# Patient Record
Sex: FEMALE | Race: WHITE | NOT HISPANIC OR LATINO | Employment: OTHER | URBAN - METROPOLITAN AREA
[De-identification: names, ages, dates, MRNs, and addresses within clinical notes are randomized per-mention and may not be internally consistent; named-entity substitution may affect disease eponyms.]

---

## 2017-03-10 ENCOUNTER — OFFICE VISIT (OUTPATIENT)
Dept: GYNECOLOGIC ONCOLOGY | Facility: CLINIC | Age: 67
End: 2017-03-10

## 2017-03-10 ENCOUNTER — LAB (OUTPATIENT)
Dept: LAB | Facility: HOSPITAL | Age: 67
End: 2017-03-10

## 2017-03-10 VITALS
TEMPERATURE: 98 F | RESPIRATION RATE: 16 BRPM | OXYGEN SATURATION: 96 % | SYSTOLIC BLOOD PRESSURE: 137 MMHG | DIASTOLIC BLOOD PRESSURE: 65 MMHG | BODY MASS INDEX: 31.46 KG/M2 | WEIGHT: 172 LBS | HEART RATE: 91 BPM

## 2017-03-10 DIAGNOSIS — R30.0 DYSURIA: Primary | ICD-10-CM

## 2017-03-10 DIAGNOSIS — R30.0 DYSURIA: ICD-10-CM

## 2017-03-10 LAB
BILIRUB UR QL STRIP: NEGATIVE
CLARITY UR: CLEAR
COLOR UR: YELLOW
GLUCOSE UR STRIP-MCNC: NEGATIVE MG/DL
HGB UR QL STRIP.AUTO: NEGATIVE
KETONES UR QL STRIP: NEGATIVE
LEUKOCYTE ESTERASE UR QL STRIP.AUTO: NEGATIVE
NITRITE UR QL STRIP: NEGATIVE
PH UR STRIP.AUTO: 6.5 [PH] (ref 5–8)
PROT UR QL STRIP: NEGATIVE
SP GR UR STRIP: 1.01 (ref 1–1.03)
UROBILINOGEN UR QL STRIP: NORMAL

## 2017-03-10 PROCEDURE — 99214 OFFICE O/P EST MOD 30 MIN: CPT | Performed by: OBSTETRICS & GYNECOLOGY

## 2017-03-10 PROCEDURE — 81003 URINALYSIS AUTO W/O SCOPE: CPT | Performed by: OBSTETRICS & GYNECOLOGY

## 2017-03-10 RX ORDER — ESTRADIOL 0.1 MG/G
2 CREAM VAGINAL DAILY
Qty: 42.5 G | Refills: 12 | Status: SHIPPED | OUTPATIENT
Start: 2017-03-10 | End: 2020-07-09

## 2017-03-10 NOTE — PROGRESS NOTES
"GYN ONCOLOGY FOLLOW-UP    Nell Montez  [id number]  1950    Chief Complaint: Follow-up (vaginal d/c)   1.  3 months following vaginal hysterectomy anterior repair  2.  Complaining of some dysuria  3.  Clear vaginal discharge  4.  Occasional blood from the vagina    History of present illness:  Nell Montez is a 67 y.o. year old female who is here today for a post-operative visit. She has been experiencing thin, yellow-clear vaginal discharge for several weeks. States it happens unexpectedly. She does not require a panty-liner. States she has also been experiencing some dysuria with initiation of voids, but only in the evening. No hematuria. Perhaps some spots of blood associated with discharge but no bright red bleeding. Normal bowel function. No fevers/chills. Also feels she has an eczematous rash in her groin and similar lesions around her ears. Did not use Premarin cream because it was too expensive.    Oncology History:     No history exists.       Past Medical History   Diagnosis Date   • Abnormal Pap smear of vagina      \"Abnormal pap smear\"   • Anxiety    • Cervical dysplasia    • Cystocele    • Migraine headache    • Osteopenia    • Transient global amnesia      fall 2015       Past Surgical History   Procedure Laterality Date   • Tonsillectomy     • Cervical biopsy     • Impacted third molar removal       wisdom teeth extracted in which two partially impacted   • Breast surgery       biopsy x 3   • Vaginal hysterectomy w/ anterior and posterior vaginal repair N/A 11/15/2016     Procedure: VAGINAL HYSTERECTOMY WITH ANTERIOR VAGINAL REPAIR;  Surgeon: Henry Pitt MD;  Location: [facility];  Service:    • Vaginal hysterectomy       with anterior repair       MEDICATIONS: The current medication list was reviewed and reconciled.     Allergies:  is allergic to betadine [povidone iodine]; codeine; e-mycin [erythromycin]; epinephrine; iodine; other; procaine; and sudafed [pseudoephedrine " hcl].    Family History   Problem Relation Age of Onset   • Other Mother      cva   • Other Father      autoimmune disease   • Rheum arthritis Daughter    • Other Daughter      ITP   • Thyroid disease Daughter    • Tuberculosis Maternal Grandmother    • Prostate cancer Maternal Grandfather        Health Maintenance: see EMR.      .     Review of Systems   Constitutional: Negative.    HENT: Negative.    Eyes: Negative.    Respiratory: Negative.    Cardiovascular: Negative.    Gastrointestinal: Negative.    Endocrine: Negative.    Genitourinary: Positive for dysuria and vaginal discharge.   Musculoskeletal: Negative.    Allergic/Immunologic: Negative.    Neurological: Negative.    Hematological: Negative.    Psychiatric/Behavioral: Negative.        Physical Exam  General Appearance:  alert, cooperative, no apparent distress, appears stated age and normal weight   Neurologic/Psychiatric: A&O x 3, gait steady, appropriate affect   HEENT:  Normocephalic, without obvious abnormality, mucous membranes moist   Neck: Supple, symmetrical, trachea midline, no adenopathy;  No thyromegaly, masses, or tenderness   Back:   Symmetric, no curvature, ROM normal, no CVA tenderness   Lungs:   Clear to auscultation bilaterally; respirations regular, even, and unlabored bilaterally   Heart:  Regular rate and rhythm, no murmurs appreciated   Breasts:  deferred   Abdomen:   Soft, non-tender, non-distended and no organomegaly   Lymph nodes: No cervical, supraclavicular, inguinal or axillary adenopathy noted   Extremities: Normal, atraumatic; no clubbing, cyanosis, or edema    Pelvic: External genitalia with two macular erythematous lesions approximately 2cm in right and left groin. No bleeding or drainage. Vaginal incisions are well healed however there is an area of what appears to be irritated granulation tissue near the urethra on the anterior incision. Silver nitrate was applied to this. Vaginal mucosa appears atrophied.     ECOG  Performance Status: 0 - Asymptomatic          Assessment and Plan:    1.  Postoperative: Vaginal atrophy, recommended beginning estrace cream as this is more affordable. Applied silver nitrate to granulation tissue. Will obtain UA for dysuria however this is most likely related to the same area of tissue. Recommended continuing some of her lifting restrictions but that she may slowly begin to increase her activity.    2.  Eczema: recommended topical 5% hydrocortisone cream    3.  We carried out a detailed conversation regarding activity limitations following an anterior repair.  Suggested limiting lifted weight to no more than 20-25 pounds.     4. RTC: 6 months          Argelia Shin MD      Note: Speech recognition transcription software was used to dictate portions of this document.  An attempt at proofreading has been made though minor errors in transcription may still be present.  Please do not hesitate to call our office with any questions.

## 2017-09-08 ENCOUNTER — OFFICE VISIT (OUTPATIENT)
Dept: GYNECOLOGIC ONCOLOGY | Facility: CLINIC | Age: 67
End: 2017-09-08

## 2017-09-08 VITALS
WEIGHT: 172 LBS | SYSTOLIC BLOOD PRESSURE: 135 MMHG | TEMPERATURE: 97.7 F | DIASTOLIC BLOOD PRESSURE: 79 MMHG | RESPIRATION RATE: 14 BRPM | HEART RATE: 79 BPM | BODY MASS INDEX: 31.46 KG/M2 | OXYGEN SATURATION: 95 %

## 2017-09-08 DIAGNOSIS — N81.11 MIDLINE CYSTOCELE: ICD-10-CM

## 2017-09-08 DIAGNOSIS — N95.2 VAGINAL ATROPHY: ICD-10-CM

## 2017-09-08 DIAGNOSIS — N81.82 INCOMPETENCE OF PUBOCERVICAL TISSUE: Primary | ICD-10-CM

## 2017-09-08 PROCEDURE — 99213 OFFICE O/P EST LOW 20 MIN: CPT | Performed by: OBSTETRICS & GYNECOLOGY

## 2017-09-08 NOTE — PROGRESS NOTES
"GYN ONCOLOGY FOLLOW-UP    Nell Montez  [id number]  1950    Chief Complaint:    1.  Six-month follow-up following vaginal repair for her symptomatic cystocele  2.  Postoperative vaginal granulation tissue  3.  Postoperative vaginal atrophy     History of present illness:  Nell Montez is a 67 y.o. year old female who is here today for follow-up for assessment of the effectiveness of surgical repair.  She reveals today that she is a trip to [facility] with her daughters and walked as much as 10 miles per day.  She was totally asymptomatic.    She initially was using the vaginal Premarin cream nightly had some side effects mainly breast tenderness.  She decreased to twice per week and has done well since.  She has had no bleeding since her last visit.    She reveals that she has not taken a bath or been since surgery.  Inquires if this is acceptable.  She does say that she has a tendency for skin sensitivity.    She is otherwise asymptomatic, she inquires regarding the need for further follow-up.  She is a regular patient of Dr. Nickolas joseph.    Oncology History:     No history exists.       Past Medical History:   Diagnosis Date   • Abnormal Pap smear of vagina     \"Abnormal pap smear\"   • Anxiety    • Cervical dysplasia    • Cystocele    • Migraine headache    • Osteopenia    • Transient global amnesia     fall 2015       Past Surgical History:   Procedure Laterality Date   • BREAST SURGERY      biopsy x 3   • CERVICAL BIOPSY     • IMPACTED THIRD MOLAR REMOVAL      wisdom teeth extracted in which two partially impacted   • TONSILLECTOMY     • VAGINAL HYSTERECTOMY      with anterior repair   • VAGINAL HYSTERECTOMY W/ ANTERIOR AND POSTERIOR VAGINAL REPAIR N/A 11/15/2016    Procedure: VAGINAL HYSTERECTOMY WITH ANTERIOR VAGINAL REPAIR;  Surgeon: Henry Pitt MD;  Location: [facility];  Service:        MEDICATIONS: The current medication list was reviewed and reconciled.     Allergies:  is allergic to betadine " [povidone iodine]; codeine; e-mycin [erythromycin]; epinephrine; iodine; other; procaine; and sudafed [pseudoephedrine hcl].    Family History   Problem Relation Age of Onset   • Other Mother      cva   • Other Father      autoimmune disease   • Rheum arthritis Daughter    • Other Daughter      ITP   • Thyroid disease Daughter    • Tuberculosis Maternal Grandmother    • Prostate cancer Maternal Grandfather               Review of Systems   Constitutional: Positive for activity change.        Tolerates increased physical activity without symptoms.   HENT: Negative.    Eyes: Positive for itching.   Respiratory: Negative.    Cardiovascular: Negative.    Gastrointestinal: Negative.    Endocrine: Negative.    Genitourinary:        Patient's prior complaints have completely disappeared.   Musculoskeletal: Negative.    Skin:        Patient complains of sensitive skin.   Allergic/Immunologic: Negative.    Neurological: Negative.    Hematological: Negative.    Psychiatric/Behavioral: Negative.        Physical Exam  Vital Signs: /79  Pulse 79  Temp 97.7 °F (36.5 °C) (Temporal Artery )   Resp 14  Wt 172 lb (78 kg)  SpO2 95%  BMI 31.46 kg/m2   General Appearance:  alert, cooperative, no apparent distress and appears stated age   Neurologic/Psychiatric: A&O x 3, gait steady, appropriate affect   HEENT:  Normocephalic, without obvious abnormality, mucous membranes moist   Neck: Supple, symmetrical, trachea midline, no adenopathy;  No thyromegaly, masses, or tenderness   Back:   Symmetric, no curvature, ROM normal, no CVA tenderness   Lungs:   Clear to auscultation bilaterally; respirations regular, even, and unlabored bilaterally   Heart:  Regular rate and rhythm, no murmurs appreciated   Breasts:  deferred   Abdomen:   Soft, non-tender, non-distended and no organomegaly   Lymph nodes: No cervical, supraclavicular, inguinal or axillary adenopathy noted   Extremities: Normal, atraumatic; no clubbing, cyanosis, or edema     Pelvic: External Genitalia  without lesions or skin changes  Vagina  is pink, moist, without lesions.   Vaginal Cuff  Female Vaginal Cuff: smooth, intact and without visible lesions  Parametria  smooth  Rectovaginal  Female rectovaginal: confirms no masses or bleeding  Excellent support since surgery.     ECOG Performance Status: 0 - Asymptomatic          Assessment and Plan:    Nell was seen today for follow-up.    Diagnoses and all orders for this visit:    Incompetence of pubocervical tissue    Midline cystocele    Vaginal atrophy  Ms. Stevens is a 67-year-old who is now 6 months ongoing a vaginal repair for a significant symptomatic cystocele.  Her therapeutic results have been excellent as noted above.  She has been using vaginal estrogen for atrophy and slow healing.  He has had excellent results and has an increased exercise tolerance since her surgery.    Return if symptoms worsen or fail to improve.  1.  In view of her current situation she can return to her primary care doctor Dr. Nickolas Fernandez.  2.  It is acceptable for her to resume tub baths and or swimming pool.  She expressed concern regarding her scan recommended a therapeutic lotion.  If reaction is intolerable; don't do it.  3.  Continue vaginal estrogen once weekly to maintain mucosal integrity.    Henry Pitt MD      Note: Speech recognition transcription software was used to dictate portions of this document.  An attempt at proofreading has been made though minor errors in transcription may still be present.  Please do not hesitate to call our office with any questions.

## 2018-02-23 ENCOUNTER — TRANSCRIBE ORDERS (OUTPATIENT)
Dept: ADMINISTRATIVE | Facility: HOSPITAL | Age: 68
End: 2018-02-23

## 2018-02-23 DIAGNOSIS — Z12.31 VISIT FOR SCREENING MAMMOGRAM: Primary | ICD-10-CM

## 2018-03-23 ENCOUNTER — HOSPITAL ENCOUNTER (OUTPATIENT)
Dept: MAMMOGRAPHY | Facility: HOSPITAL | Age: 68
Discharge: HOME | End: 2018-03-23
Attending: INTERNAL MEDICINE | Admitting: INTERNAL MEDICINE

## 2018-03-23 DIAGNOSIS — Z12.31 VISIT FOR SCREENING MAMMOGRAM: ICD-10-CM

## 2018-03-23 PROCEDURE — 77063 BREAST TOMOSYNTHESIS BI: CPT

## 2018-03-23 PROCEDURE — 77063 BREAST TOMOSYNTHESIS BI: CPT | Performed by: RADIOLOGY

## 2018-03-23 PROCEDURE — 77067 SCR MAMMO BI INCL CAD: CPT | Performed by: RADIOLOGY

## 2018-03-23 PROCEDURE — 77067 SCR MAMMO BI INCL CAD: CPT

## 2019-01-22 ENCOUNTER — TRANSCRIBE ORDERS (OUTPATIENT)
Dept: ADMINISTRATIVE | Facility: HOSPITAL | Age: 69
End: 2019-01-22

## 2019-01-22 DIAGNOSIS — K76.9 LIVER DISEASE: Primary | ICD-10-CM

## 2019-02-12 ENCOUNTER — APPOINTMENT (OUTPATIENT)
Dept: CT IMAGING | Facility: HOSPITAL | Age: 69
End: 2019-02-12
Attending: INTERNAL MEDICINE

## 2019-02-14 ENCOUNTER — TRANSCRIBE ORDERS (OUTPATIENT)
Dept: ADMINISTRATIVE | Facility: HOSPITAL | Age: 69
End: 2019-02-14

## 2019-02-14 DIAGNOSIS — Z12.31 VISIT FOR SCREENING MAMMOGRAM: Primary | ICD-10-CM

## 2019-04-02 ENCOUNTER — HOSPITAL ENCOUNTER (OUTPATIENT)
Dept: MAMMOGRAPHY | Facility: HOSPITAL | Age: 69
Discharge: HOME | End: 2019-04-02
Admitting: INTERNAL MEDICINE

## 2019-04-02 DIAGNOSIS — Z12.31 VISIT FOR SCREENING MAMMOGRAM: ICD-10-CM

## 2019-04-02 PROCEDURE — 77067 SCR MAMMO BI INCL CAD: CPT | Performed by: RADIOLOGY

## 2019-04-02 PROCEDURE — 77063 BREAST TOMOSYNTHESIS BI: CPT | Performed by: RADIOLOGY

## 2019-04-02 PROCEDURE — 77067 SCR MAMMO BI INCL CAD: CPT

## 2019-04-02 PROCEDURE — 77063 BREAST TOMOSYNTHESIS BI: CPT

## 2019-09-06 ENCOUNTER — TRANSCRIBE ORDERS (OUTPATIENT)
Dept: ADMINISTRATIVE | Facility: HOSPITAL | Age: 69
End: 2019-09-06

## 2019-09-06 DIAGNOSIS — K76.9 LIVER LESION: Primary | ICD-10-CM

## 2019-09-06 DIAGNOSIS — R16.0 LIVER MASS: ICD-10-CM

## 2019-09-06 DIAGNOSIS — C22.9 MALIGNANT NEOPLASM OF LIVER, UNSPECIFIED LIVER MALIGNANCY TYPE (HCC): ICD-10-CM

## 2019-09-11 ENCOUNTER — HOSPITAL ENCOUNTER (OUTPATIENT)
Dept: PET IMAGING | Facility: HOSPITAL | Age: 69
Discharge: HOME | End: 2019-09-11
Admitting: INTERNAL MEDICINE

## 2019-09-11 ENCOUNTER — HOSPITAL ENCOUNTER (OUTPATIENT)
Dept: PET IMAGING | Facility: HOSPITAL | Age: 69
Discharge: HOME | End: 2019-09-11

## 2019-09-11 DIAGNOSIS — R16.0 LIVER MASS: ICD-10-CM

## 2019-09-11 DIAGNOSIS — K76.9 LIVER LESION: ICD-10-CM

## 2019-09-11 DIAGNOSIS — C22.9 MALIGNANT NEOPLASM OF LIVER, UNSPECIFIED LIVER MALIGNANCY TYPE (HCC): ICD-10-CM

## 2019-09-11 LAB — GLUCOSE BLDC GLUCOMTR-MCNC: 102 MG/DL (ref 70–130)

## 2019-09-11 PROCEDURE — A9552 F18 FDG: HCPCS | Performed by: INTERNAL MEDICINE

## 2019-09-11 PROCEDURE — 82962 GLUCOSE BLOOD TEST: CPT

## 2019-09-11 PROCEDURE — 0 FLUDEOXYGLUCOSE F18 SOLUTION: Performed by: INTERNAL MEDICINE

## 2019-09-11 PROCEDURE — 78815 PET IMAGE W/CT SKULL-THIGH: CPT

## 2019-09-11 RX ADMIN — FLUDEOXYGLUCOSE F18 1 DOSE: 300 INJECTION INTRAVENOUS at 13:04

## 2019-09-13 ENCOUNTER — TRANSCRIBE ORDERS (OUTPATIENT)
Dept: ADMINISTRATIVE | Facility: HOSPITAL | Age: 69
End: 2019-09-13

## 2019-09-13 DIAGNOSIS — R93.89 ABNORMAL FINDINGS ON DIAGNOSTIC IMAGING OF OTHER SPECIFIED BODY STRUCTURES: Primary | ICD-10-CM

## 2019-09-18 ENCOUNTER — HOSPITAL ENCOUNTER (OUTPATIENT)
Dept: CT IMAGING | Facility: HOSPITAL | Age: 69
Discharge: HOME | End: 2019-09-18
Admitting: INTERNAL MEDICINE

## 2019-09-18 VITALS
SYSTOLIC BLOOD PRESSURE: 134 MMHG | BODY MASS INDEX: 27.53 KG/M2 | WEIGHT: 149.6 LBS | RESPIRATION RATE: 18 BRPM | OXYGEN SATURATION: 93 % | DIASTOLIC BLOOD PRESSURE: 82 MMHG | TEMPERATURE: 98.6 F | HEART RATE: 85 BPM | HEIGHT: 62 IN

## 2019-09-18 DIAGNOSIS — R93.89 ABNORMAL FINDINGS ON DIAGNOSTIC IMAGING OF OTHER SPECIFIED BODY STRUCTURES: ICD-10-CM

## 2019-09-18 LAB
APTT PPP: 24.5 SECONDS (ref 24–37)
INR PPP: 1.03 (ref 0.85–1.16)
PLATELET # BLD AUTO: 139 10*3/MM3 (ref 140–450)
PROTHROMBIN TIME: 13 SECONDS (ref 11.2–14.3)

## 2019-09-18 PROCEDURE — 77012 CT SCAN FOR NEEDLE BIOPSY: CPT

## 2019-09-18 PROCEDURE — 88173 CYTOPATH EVAL FNA REPORT: CPT | Performed by: INTERNAL MEDICINE

## 2019-09-18 PROCEDURE — 25010000003 LIDOCAINE 1 % SOLUTION: Performed by: RADIOLOGY

## 2019-09-18 PROCEDURE — 85730 THROMBOPLASTIN TIME PARTIAL: CPT | Performed by: RADIOLOGY

## 2019-09-18 PROCEDURE — 88172 CYTP DX EVAL FNA 1ST EA SITE: CPT | Performed by: INTERNAL MEDICINE

## 2019-09-18 PROCEDURE — 85610 PROTHROMBIN TIME: CPT | Performed by: RADIOLOGY

## 2019-09-18 PROCEDURE — 85049 AUTOMATED PLATELET COUNT: CPT | Performed by: RADIOLOGY

## 2019-09-18 RX ORDER — ASPIRIN 81 MG/1
81 TABLET ORAL DAILY
COMMUNITY
End: 2019-11-15 | Stop reason: HOSPADM

## 2019-09-18 RX ORDER — LIDOCAINE HYDROCHLORIDE 10 MG/ML
20 INJECTION, SOLUTION INFILTRATION; PERINEURAL ONCE
Status: COMPLETED | OUTPATIENT
Start: 2019-09-18 | End: 2019-09-18

## 2019-09-18 RX ORDER — SODIUM CHLORIDE 0.9 % (FLUSH) 0.9 %
3 SYRINGE (ML) INJECTION EVERY 12 HOURS SCHEDULED
Status: DISCONTINUED | OUTPATIENT
Start: 2019-09-18 | End: 2019-09-19 | Stop reason: HOSPADM

## 2019-09-18 RX ORDER — SODIUM CHLORIDE 0.9 % (FLUSH) 0.9 %
10 SYRINGE (ML) INJECTION AS NEEDED
Status: DISCONTINUED | OUTPATIENT
Start: 2019-09-18 | End: 2019-09-19 | Stop reason: HOSPADM

## 2019-09-18 RX ADMIN — LIDOCAINE HYDROCHLORIDE 20 ML: 10 INJECTION, SOLUTION INFILTRATION; PERINEURAL at 09:15

## 2019-09-18 NOTE — NURSING NOTE
Patient placed on cardiac monitors, vitals stable. Images taken and reviewed by Dr. Owens. A biopsy obtained at the umbilicus. Patient tolerated well. Sample reviewed by pathology in the room. Report to CHELSEA.

## 2019-09-20 LAB
LAB AP CASE REPORT: NORMAL
PATH REPORT.FINAL DX SPEC: NORMAL

## 2019-09-26 ENCOUNTER — LAB (OUTPATIENT)
Dept: LAB | Facility: HOSPITAL | Age: 69
End: 2019-09-26

## 2019-09-26 ENCOUNTER — APPOINTMENT (OUTPATIENT)
Dept: PREADMISSION TESTING | Facility: HOSPITAL | Age: 69
End: 2019-09-26

## 2019-09-26 ENCOUNTER — DOCUMENTATION (OUTPATIENT)
Dept: ONCOLOGY | Facility: CLINIC | Age: 69
End: 2019-09-26

## 2019-09-26 ENCOUNTER — CONSULT (OUTPATIENT)
Dept: ONCOLOGY | Facility: CLINIC | Age: 69
End: 2019-09-26

## 2019-09-26 VITALS
DIASTOLIC BLOOD PRESSURE: 60 MMHG | WEIGHT: 152 LBS | TEMPERATURE: 98.7 F | SYSTOLIC BLOOD PRESSURE: 124 MMHG | BODY MASS INDEX: 27.97 KG/M2 | HEIGHT: 62 IN | OXYGEN SATURATION: 96 % | HEART RATE: 82 BPM | RESPIRATION RATE: 16 BRPM

## 2019-09-26 DIAGNOSIS — C78.7 LIVER METASTASES: ICD-10-CM

## 2019-09-26 DIAGNOSIS — C80.1 CARCINOMA OF UNKNOWN PRIMARY (HCC): ICD-10-CM

## 2019-09-26 DIAGNOSIS — C78.7 LIVER METASTASES: Primary | Chronic | ICD-10-CM

## 2019-09-26 DIAGNOSIS — R97.8 OTHER ABNORMAL TUMOR MARKERS: ICD-10-CM

## 2019-09-26 DIAGNOSIS — C80.1 CARCINOMA OF UNKNOWN PRIMARY (HCC): Chronic | ICD-10-CM

## 2019-09-26 LAB
ALBUMIN SERPL-MCNC: 4.4 G/DL (ref 3.5–5.2)
ALBUMIN/GLOB SERPL: 2.1 G/DL
ALP SERPL-CCNC: 68 U/L (ref 39–117)
ALT SERPL W P-5'-P-CCNC: 18 U/L (ref 1–33)
ANION GAP SERPL CALCULATED.3IONS-SCNC: 11 MMOL/L (ref 5–15)
AST SERPL-CCNC: 30 U/L (ref 1–32)
BILIRUB SERPL-MCNC: 0.5 MG/DL (ref 0.2–1.2)
BUN BLD-MCNC: 28 MG/DL (ref 8–23)
BUN/CREAT SERPL: 36.8 (ref 7–25)
CALCIUM SPEC-SCNC: 10.8 MG/DL (ref 8.6–10.5)
CANCER AG125 SERPL QL: 278 U/ML (ref 0–38.1)
CANCER AG19-9 SERPL-ACNC: 12.1 U/ML
CEA SERPL-MCNC: 2.86 NG/ML
CHLORIDE SERPL-SCNC: 105 MMOL/L (ref 98–107)
CO2 SERPL-SCNC: 29 MMOL/L (ref 22–29)
CREAT BLD-MCNC: 0.76 MG/DL (ref 0.57–1)
ERYTHROCYTE [DISTWIDTH] IN BLOOD BY AUTOMATED COUNT: 14.7 % (ref 12.3–15.4)
GFR SERPL CREATININE-BSD FRML MDRD: 75 ML/MIN/1.73
GLOBULIN UR ELPH-MCNC: 2.1 GM/DL
GLUCOSE BLD-MCNC: 86 MG/DL (ref 65–99)
HCT VFR BLD AUTO: 37.5 % (ref 34–46.6)
HGB BLD-MCNC: 12.6 G/DL (ref 12–15.9)
LYMPHOCYTES # BLD AUTO: 1.5 10*3/MM3 (ref 0.7–3.1)
LYMPHOCYTES NFR BLD AUTO: 29 % (ref 19.6–45.3)
MCH RBC QN AUTO: 32.3 PG (ref 26.6–33)
MCHC RBC AUTO-ENTMCNC: 33.6 G/DL (ref 31.5–35.7)
MCV RBC AUTO: 96.1 FL (ref 79–97)
MONOCYTES # BLD AUTO: 0.3 10*3/MM3 (ref 0.1–0.9)
MONOCYTES NFR BLD AUTO: 5.4 % (ref 5–12)
NEUTROPHILS # BLD AUTO: 3.5 10*3/MM3 (ref 1.7–7)
NEUTROPHILS NFR BLD AUTO: 65.6 % (ref 42.7–76)
PLATELET # BLD AUTO: 174 10*3/MM3 (ref 140–450)
PMV BLD AUTO: 7.4 FL (ref 6–12)
POTASSIUM BLD-SCNC: 3.8 MMOL/L (ref 3.5–5.2)
PROT SERPL-MCNC: 6.5 G/DL (ref 6–8.5)
RBC # BLD AUTO: 3.9 10*6/MM3 (ref 3.77–5.28)
SODIUM BLD-SCNC: 145 MMOL/L (ref 136–145)
WBC NRBC COR # BLD: 5.3 10*3/MM3 (ref 3.4–10.8)

## 2019-09-26 PROCEDURE — 36415 COLL VENOUS BLD VENIPUNCTURE: CPT

## 2019-09-26 PROCEDURE — 85025 COMPLETE CBC W/AUTO DIFF WBC: CPT

## 2019-09-26 PROCEDURE — 82378 CARCINOEMBRYONIC ANTIGEN: CPT

## 2019-09-26 PROCEDURE — 86304 IMMUNOASSAY TUMOR CA 125: CPT

## 2019-09-26 PROCEDURE — 80053 COMPREHEN METABOLIC PANEL: CPT

## 2019-09-26 PROCEDURE — 99205 OFFICE O/P NEW HI 60 MIN: CPT | Performed by: INTERNAL MEDICINE

## 2019-09-26 PROCEDURE — 86301 IMMUNOASSAY TUMOR CA 19-9: CPT

## 2019-09-26 PROCEDURE — 93005 ELECTROCARDIOGRAM TRACING: CPT

## 2019-09-26 PROCEDURE — 93010 ELECTROCARDIOGRAM REPORT: CPT | Performed by: INTERNAL MEDICINE

## 2019-09-26 RX ORDER — RAMIPRIL 2.5 MG/1
2.5 CAPSULE ORAL DAILY
COMMUNITY
Start: 2019-06-25 | End: 2019-11-15 | Stop reason: HOSPADM

## 2019-09-26 NOTE — PROGRESS NOTES
I saw patient today in Dr. Swenson's office. Patient comes to office today with her sister-in-law. Patient says that she has been tired and has lost around 20 pounds since January of 2019. I introduced myself and went over my role as her Nurse Navigator. I provided patient with my contact card. Dr. Swenson discussed the Outpatient Surgery of removal of the umbilical mass. Patient agreed and scheduled the surgery for next Wednesday at 8am at the [facility]. I walked patient over to Registration and then they will take patient to PAT. Patient emotional from time to time during our visit. She asked me what she had done wrong. I told her that she had done nothing wrong. I told patient that we have a team that works together to care for her. Patient will call for any navigational needs. AG

## 2019-09-26 NOTE — PROGRESS NOTES
CHIEF COMPLAINT: Widely metastatic cancer    REASON FOR REFERRAL: Widely metastatic cancer      RECORDS OBTAINED  Records of the patients history including those obtained from Re[facility] jake were reviewed and summarized in detail.    Oncology/Hematology History    1. History of benign transvaginal hysterectomy 1/1/2016  2. History of reflux  3. History of transient global amnesia  4. IV dye allergy as well as allergy to barium enema  5. Diffuse metastases  6. Anterior mediastinal and subcarinal nodes  7. Right lower lobe nodule  8. Head of pancreas uptake  9. Paraesophageal node uptake  10. Liver metastases  11. Splenic hilar metastases  12. Bilateral adrenal gland uptake  13. Mesenteric/right retroperitoneal/right iliac uptake  14. Spine, right shoulder, right rib, sacrum, right hip metastases  15. Periumbilical subcutaneous nodule  16. Extensive periportal and periaortic lymphadenopathy    -9/26/2019 [facility] medical oncology consultation: Oncologic history as follows  1/1/2016 vaginal hysterectomy for chronic cystic cervicitis and weakly proliferative endometrium with endometritis and a leiomyoma benign.  6/8/2016 colonoscopy benign  1/2019 apparently had CT screening of the chest that showed a couple of liver abnormalities (do not have those reports).  Unable to do with contrast and IV dye allergy.  Asymptomatic.  9/5/2019 CT of the abdomen without contrast shows at least 2 hepatic lesions, 4 cm in the right lobe of the liver and 2 cm in the left lobe of the liver.  9/11/2019 PET scan shows too numerous to count increased hypermetabolism in the anterior mediastinal, subcarinal, right lower lobe, head of the pancreas, esophagus, adjacent paraesophageal lymph nodes, liver, splenic hilum, left and right adrenal glands.  Also uptake in the mesentery, right retroperitoneum, right iliac chain, periumbilical subcutaneous fat, vaginal stump, and numerous bony lesions.  The bony lesions include the spine, right shoulder,  "right rib, sacrum, and right hip.  The 1.3 cm periumbilical subcutaneous nodule appears most amenable to biopsy.  Extensive periportal and periaortic lymphadenopathy with evidence of peritoneal metastasis for which PET CT was recommended.  9/18/2019 CT-guided fine-needle aspirate of nodule within the anterior abdominal wall \"atypical\" but unable to further typify.  9/26/2019 Dr. Kline initial visit.  Reviewed PET with patient and worrisome but nondiagnostic fine-needle aspirate.  Going to get sufficient tissue not only for diagnosis but for molecular testing that could include Biotheranostics to distinguish tissue type and 15 slides for Strata testing.  We will get our navigator on board as well.  She is having some nighttime sweats without fever though she does not describe them as bed drenching but this is new for her in the last month.  The range of possibilities here are broad and include lymphoma as well as a multiplicity of carcinomas but I need more than \"atypical\" cells on a fine-needle aspirate to ultimately decide our course.  We did talk about the likelihood that what ever we are up against it is unlikely to be curative but we will wait out her risk benefit ratio once we know the pathology.        Liver metastases (CMS/HCC)    9/26/2019 Initial Diagnosis     Liver metastases (CMS/HCC)            HISTORY OF PRESENT ILLNESS:  The patient is a 69 y.o.  female, referred for widely metastatic cancer.  Ironically, she feels great.  She had a history of benign vaginal hysterectomy and her ovaries were left.  Stumbled upon an abnormality January 2019 on cardiac screening CT showing a couple of abnormalities in the liver.  Subsequent scanning done September as outlined above followed by periumbilical nodule biopsy.  Other than for some recent onset in the last month of increasing sweating at night without bed drenching or fevers or weight changes, she has no complaints.    REVIEW OF SYSTEMS:  A 14 point review of " "systems was performed and is negative except as noted above.    Past Medical History:   Diagnosis Date   • Abnormal Pap smear of vagina     \"Abnormal pap smear\"   • Anxiety    • Cervical dysplasia    • Cystocele    • GERD (gastroesophageal reflux disease)     INTERMITTENT- NOT MEDICATION   • Migraine headache    • Osteopenia    • Transient global amnesia     fall 2015     Past Surgical History:   Procedure Laterality Date   • BREAST CYST ASPIRATION     • BREAST SURGERY      biopsy x 3   • CERVICAL BIOPSY      PT UNAWARE OF CERVIX BIOPSY   • IMPACTED THIRD MOLAR REMOVAL      wisdom teeth extracted in which two partially impacted   • TONSILLECTOMY     • VAGINAL HYSTERECTOMY      with anterior repair   • VAGINAL HYSTERECTOMY W/ ANTERIOR AND POSTERIOR VAGINAL REPAIR N/A 11/15/2016    Procedure: VAGINAL HYSTERECTOMY WITH ANTERIOR VAGINAL REPAIR;  Surgeon: Henry Pitt MD;  Location: [facility];  Service:        Current Outpatient Medications on File Prior to Visit   Medication Sig Dispense Refill   • Cholecalciferol (VITAMIN D) 1000 UNITS tablet Take 1,000 Units by mouth Daily. Takes 2 daily     • estradiol (ESTRACE VAGINAL) 0.1 MG/GM vaginal cream Insert 2 g into the vagina Daily. 42.5 g 12   • loratadine (CLARITIN) 10 MG tablet Take 10 mg by mouth daily.     • ramipril (ALTACE) 2.5 MG capsule Take 2.5 mg by mouth Daily.     • aspirin 81 MG EC tablet Take 81 mg by mouth Daily. LAST DOSE 11TH     • Docusate Sodium (DSS) 100 MG capsule Take 100 mg by mouth 2 (Two) Times a Day. 60 each 0   • Multiple Vitamins-Minerals (MULTIVITAMIN ADULT PO) Take  by mouth Daily.     • Omega-3 Fatty Acids (FISH OIL) 1000 MG capsule capsule Take  by mouth Daily With Breakfast.       No current facility-administered medications on file prior to visit.        Allergies   Allergen Reactions   • Betadine [Povidone Iodine]    • Codeine    • E-Mycin [Erythromycin]    • Epinephrine    • Iodine    • Other      MSG, Rema, Preservatives in eye " "ointment   • Procaine Other (See Comments)     DENTAL ANASTHESIA CAUSED HEART RACING- YRS AGO\"   • Sudafed [Pseudoephedrine Hcl]        Social History     Socioeconomic History   • Marital status:      Spouse name: Not on file   • Number of children: 7   • Years of education: Not on file   • Highest education level: Not on file   Tobacco Use   • Smoking status: Former Smoker     Packs/day: 0.50     Years: 4.00     Pack years: 2.00     Last attempt to quit: 1970     Years since quittin.7   • Smokeless tobacco: Never Used   • Tobacco comment: quit approximately    Substance and Sexual Activity   • Alcohol use: No     Comment: rare   • Drug use: No   • Sexual activity: Not Currently       Family History   Problem Relation Age of Onset   • Other Mother         cva   • Other Father         autoimmune disease   • Rheum arthritis Daughter    • Other Daughter         ITP   • Thyroid disease Daughter    • Tuberculosis Maternal Grandmother    • Prostate cancer Maternal Grandfather    • Brain cancer Cousin    • Breast cancer Neg Hx    • Ovarian cancer Neg Hx        PHYSICAL EXAM:    /60   Pulse 82   Temp 98.7 °F (37.1 °C)   Resp 16   Ht 157.5 cm (62\")   Wt 68.9 kg (152 lb)   SpO2 96%   BMI 27.80 kg/m²     ECOG: (0) Fully active, able to carry on all predisease performance without restriction  General: well appearing female in no acute distress  HEENT: sclera anicteric, oropharynx clear  Lymphatics: no cervical, supraclavicular, inguinal, or axillary adenopathy  Cardiovascular: regular rate and rhythm, no murmurs  Neck: Supple; No thyromegaly  Lungs: clear to auscultation bilaterally. No respiratory distress.   Abdomen: soft, nontender, nondistended.  No palpable organomegaly  Extremities: no cyanosis, clubbing, edema, or cords  Skin: no rashes, lesions, bruising, or petechiae  Neuro: Alert and oriented x 4; Moving all extremities.  Psych: No anxiety or depression    Lab Results   Component Value " Date    HGB 11.2 (L) 11/16/2016    HCT 33.7 (L) 11/16/2016    MCV 89.4 11/16/2016     (L) 09/18/2019    WBC 8.39 11/16/2016    NEUTROABS 5.64 11/16/2016    LYMPHSABS 2.09 11/16/2016    MONOSABS 0.60 11/16/2016    EOSABS 0.03 (L) 11/16/2016    BASOSABS 0.02 11/16/2016     Lab Results   Component Value Date    GLUCOSE 106 (H) 11/16/2016    BUN 8 (L) 11/16/2016    CREATININE 0.60 11/16/2016     11/16/2016    K 3.9 11/16/2016     11/16/2016    CO2 27.0 11/16/2016    CALCIUM 8.5 (L) 11/16/2016    PROTEINTOT 6.4 11/14/2016    ALBUMIN 3.70 11/14/2016    BILITOT 0.6 11/14/2016    ALKPHOS 77 11/14/2016    AST 24 11/14/2016    ALT 22 11/14/2016           Assessment/Plan     1. History of benign transvaginal hysterectomy 1/1/2016  2. History of reflux  3. History of transient global amnesia  4. IV dye allergy as well as allergy to barium enema  5. Diffuse metastases  6. Anterior mediastinal and subcarinal nodes  7. Right lower lobe nodule  8. Head of pancreas uptake  9. Paraesophageal node uptake  10. Liver metastases  11. Splenic hilar metastases  12. Bilateral adrenal gland uptake  13. Mesenteric/right retroperitoneal/right iliac uptake  14. Spine, right shoulder, right rib, sacrum, right hip metastases  15. Periumbilical subcutaneous nodule  16. Extensive periportal and periaortic lymphadenopathy    -9/26/2019 [facility] medical oncology consultation: Oncologic history as follows  1/1/2016 vaginal hysterectomy for chronic cystic cervicitis and weakly proliferative endometrium with endometritis and a leiomyoma benign.  6/8/2016 colonoscopy benign  1/2019 apparently had CT screening of the chest that showed a couple of liver abnormalities (do not have those reports).  Unable to do with contrast and IV dye allergy.  Asymptomatic.  9/5/2019 CT of the abdomen without contrast shows at least 2 hepatic lesions, 4 cm in the right lobe of the liver and 2 cm in the left lobe of the liver.  9/11/2019 PET scan shows too  "numerous to count increased hypermetabolism in the anterior mediastinal, subcarinal, right lower lobe, head of the pancreas, esophagus, adjacent paraesophageal lymph nodes, liver, splenic hilum, left and right adrenal glands.  Also uptake in the mesentery, right retroperitoneum, right iliac chain, periumbilical subcutaneous fat, vaginal stump, and numerous bony lesions.  The bony lesions include the spine, right shoulder, right rib, sacrum, and right hip.  The 1.3 cm periumbilical subcutaneous nodule appears most amenable to biopsy.  Extensive periportal and periaortic lymphadenopathy with evidence of peritoneal metastasis for which PET CT was recommended.  9/18/2019 CT-guided fine-needle aspirate of nodule within the anterior abdominal wall \"atypical\" but unable to further typify.  9/26/2019 Dr. Kline initial visit.  Reviewed PET with patient and worrisome but nondiagnostic fine-needle aspirate.  Going to get sufficient tissue not only for diagnosis but for molecular testing that could include Biotheranostics to distinguish tissue type and 15 slides for Strata testing.  We will get our navigator on board as well.  She is having some nighttime sweats without fever though she does not describe them as bed drenching but this is new for her in the last month.  The range of possibilities here are broad and include lymphoma as well as a multiplicity of carcinomas but I need more than \"atypical\" cells on a fine-needle aspirate to ultimately decide our course.  We did talk about the likelihood that what ever we are up against it is unlikely to be curative but we will wait out her risk benefit ratio once we know the pathology.  Though they may ultimately not be helpful, while we are getting tissue I will go ahead and send off a barrage of tumor markers that might help guide us to the primary.  I discussed with patient face-to-face for 65 minutes greater than 50% spent counseling regarding the complexity of this decision " tree and the plan as outlined above.  I would add parenthetically that she is up-to-date on her colonoscopy and mammography has been done in the last year.  Hence we will just check Ca125, CA-19-9, and CEA.      Ranulfo Kline MD    9/26/2019

## 2019-10-02 ENCOUNTER — LAB REQUISITION (OUTPATIENT)
Dept: LAB | Facility: HOSPITAL | Age: 69
End: 2019-10-02

## 2019-10-02 DIAGNOSIS — C79.9 SECONDARY MALIGNANT NEOPLASM OF UNSPECIFIED SITE (CODE) (HCC): ICD-10-CM

## 2019-10-02 PROCEDURE — 88342 IMHCHEM/IMCYTCHM 1ST ANTB: CPT | Performed by: SURGERY

## 2019-10-02 PROCEDURE — 88365 INSITU HYBRIDIZATION (FISH): CPT | Performed by: SURGERY

## 2019-10-02 PROCEDURE — 88305 TISSUE EXAM BY PATHOLOGIST: CPT | Performed by: SURGERY

## 2019-10-02 PROCEDURE — 88341 IMHCHEM/IMCYTCHM EA ADD ANTB: CPT | Performed by: SURGERY

## 2019-10-02 PROCEDURE — 88364 INSITU HYBRIDIZATION (FISH): CPT | Performed by: SURGERY

## 2019-10-02 PROCEDURE — 88360 TUMOR IMMUNOHISTOCHEM/MANUAL: CPT | Performed by: SURGERY

## 2019-10-07 ENCOUNTER — LAB (OUTPATIENT)
Dept: LAB | Facility: HOSPITAL | Age: 69
End: 2019-10-07

## 2019-10-07 ENCOUNTER — OFFICE VISIT (OUTPATIENT)
Dept: ONCOLOGY | Facility: CLINIC | Age: 69
End: 2019-10-07

## 2019-10-07 VITALS
OXYGEN SATURATION: 95 % | HEIGHT: 62 IN | BODY MASS INDEX: 27.79 KG/M2 | RESPIRATION RATE: 18 BRPM | TEMPERATURE: 98.8 F | SYSTOLIC BLOOD PRESSURE: 119 MMHG | DIASTOLIC BLOOD PRESSURE: 60 MMHG | WEIGHT: 151 LBS | HEART RATE: 91 BPM

## 2019-10-07 DIAGNOSIS — Z51.11 ENCOUNTER FOR ANTINEOPLASTIC CHEMOTHERAPY: ICD-10-CM

## 2019-10-07 DIAGNOSIS — T45.1X5S ADVERSE REACTION TO ANTINEOPLASTIC DRUG, SEQUELA: ICD-10-CM

## 2019-10-07 DIAGNOSIS — C78.7 LIVER METASTASES: Chronic | ICD-10-CM

## 2019-10-07 DIAGNOSIS — C83.38 DIFFUSE LARGE B-CELL LYMPHOMA OF LYMPH NODES OF MULTIPLE REGIONS (HCC): ICD-10-CM

## 2019-10-07 DIAGNOSIS — C83.38 DIFFUSE LARGE B-CELL LYMPHOMA OF LYMPH NODES OF MULTIPLE REGIONS (HCC): Primary | ICD-10-CM

## 2019-10-07 LAB
LDH SERPL-CCNC: 216 U/L (ref 135–214)
URATE SERPL-MCNC: 5.9 MG/DL (ref 2.4–5.7)

## 2019-10-07 PROCEDURE — 84550 ASSAY OF BLOOD/URIC ACID: CPT

## 2019-10-07 PROCEDURE — 36415 COLL VENOUS BLD VENIPUNCTURE: CPT

## 2019-10-07 PROCEDURE — 99215 OFFICE O/P EST HI 40 MIN: CPT | Performed by: INTERNAL MEDICINE

## 2019-10-07 PROCEDURE — 83615 LACTATE (LD) (LDH) ENZYME: CPT

## 2019-10-07 RX ORDER — PREDNISONE 50 MG/1
100 TABLET ORAL DAILY
Qty: 10 TABLET | Refills: 5 | Status: SHIPPED | OUTPATIENT
Start: 2019-10-07 | End: 2019-10-12

## 2019-10-07 RX ORDER — ONDANSETRON HYDROCHLORIDE 8 MG/1
8 TABLET, FILM COATED ORAL 3 TIMES DAILY PRN
Qty: 30 TABLET | Refills: 5 | Status: SHIPPED | OUTPATIENT
Start: 2019-10-07 | End: 2021-01-01

## 2019-10-07 RX ORDER — LIDOCAINE AND PRILOCAINE 25; 25 MG/G; MG/G
CREAM TOPICAL AS NEEDED
Qty: 30 G | Refills: 3 | Status: SHIPPED | OUTPATIENT
Start: 2019-10-07 | End: 2019-11-25 | Stop reason: HOSPADM

## 2019-10-07 NOTE — PROGRESS NOTES
CHIEF COMPLAINT: Management of lymphoma    Problem List:  Oncology/Hematology History    1. History of benign transvaginal hysterectomy 1/1/2016  2. History of reflux  3. History of transient global amnesia  4. IV dye allergy as well as allergy to barium enema  5. 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    -9/26/2019 [facility] medical oncology consultation: Oncologic history as follows  1/1/2016 vaginal hysterectomy for chronic cystic cervicitis and weakly proliferative endometrium with endometritis and a leiomyoma benign.  6/8/2016 colonoscopy benign  1/2019 apparently had CT screening of the chest that showed a couple of liver abnormalities (do not have those reports).  Unable to do with contrast and IV dye allergy.  Asymptomatic.  9/5/2019 CT of the abdomen without contrast shows at least 2 hepatic lesions, 4 cm in the right lobe of the liver and 2 cm in the left lobe of the liver.  9/11/2019 PET scan shows too numerous to count increased hypermetabolism in the anterior mediastinal, subcarinal, right lower lobe, head of the pancreas, esophagus, adjacent paraesophageal lymph nodes, liver, splenic hilum, left and right adrenal glands.  Also uptake in the mesentery, right retroperitoneum, right iliac chain, periumbilical subcutaneous fat, vaginal stump, and numerous bony lesions.  The bony lesions include the spine, right shoulder, right rib, sacrum, and right hip.  The 1.3 cm periumbilical subcutaneous nodule appears  "most amenable to biopsy.  Extensive periportal and periaortic lymphadenopathy with evidence of peritoneal metastasis for which PET CT was recommended.  9/18/2019 CT-guided fine-needle aspirate of nodule within the anterior abdominal wall \"atypical\" but unable to further typify.  9/26/2019 Dr. Kline initial visit.  Reviewed PET with patient and worrisome but nondiagnostic fine-needle aspirate.  Going to get sufficient tissue not only for diagnosis but for molecular testing that could include Biotheranostics to distinguish tissue type and 15 slides for Strata testing.  We will get our navigator on board as well.  She is having some nighttime sweats without fever though she does not describe them as bed drenching but this is new for her in the last month.  The range of possibilities here are broad and include lymphoma as well as a multiplicity of carcinomas but I need more than \"atypical\" cells on a fine-needle aspirate to ultimately decide our course.  We did talk about the likelihood that what ever we are up against it is unlikely to be curative but we will wait out her risk benefit ratio once we know the pathology.  Though they may ultimately not be helpful, while we are getting tissue I will go ahead and send off a barrage of tumor markers that might help guide us to the primary.    -10/7/2019 medical oncology office visit: Preliminary path report shows T-cell rich B-cell non-Hodgkin's lymphoma.  While awaiting final pathology we will get her port and echocardiogram and plan on treating her with Rituxan CHOP with Neulasta device for her stage IV high-grade lymphoma.  She has a high risk IPI score.  We will get her LDH and uric acid today.  Risks including renal dysfunction, nausea, neuropathy, pancytopenia, cardiac dysfunction, infusion reactions, etc. were discussed in detail in addition to additional side effects as standard per all these medicines.  She knows that if this is the T-cell rich B-cell lymphoma that " "we treat this like a diffuse large B-cell lymphoma and that there is a possibility but not a guarantee of cure and it is not the majority in this setting but that plan and the statistics may change if the final pathology changes.        Liver metastases (CMS/HCC)    9/26/2019 Initial Diagnosis     Liver metastases (CMS/HCC)            HISTORY OF PRESENT ILLNESS:  The patient is a 69 y.o. female, here for follow up on management of stage IV lymphoma.  Final pathology pending.  Interestingly, her night sweats vanished when she stopped her blood pressure medicines.  She is off of niacin as well as her statin drugs as well as aspirin per her own recognizance recently.  She says she feels much better off of everything.  Presently no bed drenching night sweats, fevers, chills, etc.  She is eating well and her weight loss has stabilized.      Past Medical History:   Diagnosis Date   • Abnormal Pap smear of vagina     \"Abnormal pap smear\"   • Anxiety    • Cervical dysplasia    • Cystocele    • GERD (gastroesophageal reflux disease)     INTERMITTENT- NOT MEDICATION   • Migraine headache    • Osteopenia    • Transient global amnesia     fall 2015     Past Surgical History:   Procedure Laterality Date   • BREAST CYST ASPIRATION     • BREAST SURGERY      biopsy x 3   • CERVICAL BIOPSY      PT UNAWARE OF CERVIX BIOPSY   • IMPACTED THIRD MOLAR REMOVAL      wisdom teeth extracted in which two partially impacted   • OTHER SURGICAL HISTORY  10/02/2019    surgical bx of abd wall tumor   • TONSILLECTOMY     • VAGINAL HYSTERECTOMY      with anterior repair   • VAGINAL HYSTERECTOMY W/ ANTERIOR AND POSTERIOR VAGINAL REPAIR N/A 11/15/2016    Procedure: VAGINAL HYSTERECTOMY WITH ANTERIOR VAGINAL REPAIR;  Surgeon: Henry Pitt MD;  Location: [facility];  Service:        Allergies   Allergen Reactions   • Betadine [Povidone Iodine]    • Codeine    • E-Mycin [Erythromycin]    • Epinephrine    • Iodine    • Other      MSG, Rema, " "Preservatives in eye ointment   • Procaine Other (See Comments)     DENTAL ANASTHESIA CAUSED HEART RACING- YRS AGO\"   • Sudafed [Pseudoephedrine Hcl]        Family History and Social History reviewed and changed as necessary      REVIEW OF SYSTEM:   Review of Systems   Constitutional: Negative for appetite change, chills, diaphoresis, fatigue, fever and unexpected weight change.   HENT:   Negative for mouth sores, sore throat and trouble swallowing.    Eyes: Negative for icterus.   Respiratory: Negative for cough, hemoptysis and shortness of breath.    Cardiovascular: Negative for chest pain, leg swelling and palpitations.   Gastrointestinal: Negative for abdominal distention, abdominal pain, blood in stool, constipation, diarrhea, nausea and vomiting.   Endocrine: Negative for hot flashes.   Genitourinary: Negative for bladder incontinence, difficulty urinating, dysuria, frequency and hematuria.    Musculoskeletal: Negative for gait problem, neck pain and neck stiffness.   Skin: Negative for rash.   Neurological: Negative for dizziness, gait problem, headaches, light-headedness and numbness.   Hematological: Negative for adenopathy. Does not bruise/bleed easily.   Psychiatric/Behavioral: Negative for depression. The patient is not nervous/anxious.    All other systems reviewed and are negative.       PHYSICAL EXAM    Vitals:    10/07/19 0807   BP: 119/60   Pulse: 91   Resp: 18   Temp: 98.8 °F (37.1 °C)   TempSrc: Temporal   SpO2: 95%   Weight: 68.5 kg (151 lb)   Height: 157.5 cm (62\")     Constitutional: Appears well-developed and well-nourished. No distress.   ECOG: (0) Fully active, able to carry on all predisease performance without restriction  HENT:   Head: Normocephalic.   Mouth/Throat: Oropharynx is clear and moist.   Eyes: Conjunctivae are normal. Pupils are equal, round, and reactive to light. No scleral icterus.   Neck: Neck supple. No JVD present. No thyromegaly present.   Cardiovascular: Normal rate, " regular rhythm and normal heart sounds.    Pulmonary/Chest: Breath sounds normal. No respiratory distress.   Abdominal: Soft. Exhibits no distension and no mass. There is no hepatosplenomegaly. There is no tenderness. There is no rebound and no guarding.   Musculoskeletal:Exhibits no edema, tenderness or deformity.   Neurological: Alert and oriented to person, place, and time. Exhibits normal muscle tone.   Skin: No ecchymosis, no petechiae and no rash noted. Not diaphoretic. No cyanosis. Nails show no clubbing.   Psychiatric: Normal mood and affect.   Vitals reviewed.      Lab Results   Component Value Date    HGB 12.6 09/26/2019    HCT 37.5 09/26/2019    MCV 96.1 09/26/2019     09/26/2019    WBC 5.30 09/26/2019    NEUTROABS 3.50 09/26/2019    LYMPHSABS 1.50 09/26/2019    MONOSABS 0.30 09/26/2019    EOSABS 0.03 (L) 11/16/2016    BASOSABS 0.02 11/16/2016       Lab Results   Component Value Date    GLUCOSE 86 09/26/2019    BUN 28 (H) 09/26/2019    CREATININE 0.76 09/26/2019     09/26/2019    K 3.8 09/26/2019     09/26/2019    CO2 29.0 09/26/2019    CALCIUM 10.8 (H) 09/26/2019    PROTEINTOT 6.5 09/26/2019    ALBUMIN 4.40 09/26/2019    BILITOT 0.5 09/26/2019    ALKPHOS 68 09/26/2019    AST 30 09/26/2019    ALT 18 09/26/2019                   ASSESSMENT & PLAN:    1. History of benign transvaginal hysterectomy 1/1/2016  2. History of reflux  3. History of transient global amnesia  4. IV dye allergy as well as allergy to barium enema  5. 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    -9/26/2019 [facility] medical oncology consultation: Oncologic history as follows  1/1/2016 vaginal hysterectomy for chronic cystic cervicitis and weakly proliferative endometrium with endometritis and a leiomyoma benign.  6/8/2016 colonoscopy benign  1/2019 apparently had CT screening of the chest that showed a couple of liver abnormalities (do not have those reports).  Unable to do with contrast and IV dye allergy.  Asymptomatic.  9/5/2019 CT of the abdomen without contrast shows at least 2 hepatic lesions, 4 cm in the right lobe of the liver and 2 cm in the left lobe of the liver.  9/11/2019 PET scan shows too numerous to count increased hypermetabolism in the anterior mediastinal, subcarinal, right lower lobe, head of the pancreas, esophagus, adjacent paraesophageal lymph nodes, liver, splenic hilum, left and right adrenal glands.  Also uptake in the mesentery, right retroperitoneum, right iliac chain, periumbilical subcutaneous fat, vaginal stump, and numerous bony lesions.  The bony lesions include the spine, right shoulder, right rib, sacrum, and right hip.  The 1.3 cm periumbilical subcutaneous nodule appears most amenable to biopsy.  Extensive periportal and periaortic lymphadenopathy with evidence of peritoneal metastasis for which PET CT was recommended.  9/18/2019 CT-guided fine-needle aspirate of nodule within the anterior abdominal wall \"atypical\" but unable to further typify.  9/26/2019 Dr. Kline initial visit.  Reviewed PET with patient and worrisome but nondiagnostic fine-needle aspirate.  Going to get sufficient tissue not only for diagnosis but for molecular testing that could include Biotheranostics to distinguish tissue type and 15 slides for Strata testing.  We will get our navigator on board as well.  She is having some nighttime sweats without fever though she does not describe them as bed drenching but this is new " "for her in the last month.  The range of possibilities here are broad and include lymphoma as well as a multiplicity of carcinomas but I need more than \"atypical\" cells on a fine-needle aspirate to ultimately decide our course.  We did talk about the likelihood that what ever we are up against it is unlikely to be curative but we will wait out her risk benefit ratio once we know the pathology.  Though they may ultimately not be helpful, while we are getting tissue I will go ahead and send off a barrage of tumor markers that might help guide us to the primary.    -10/7/2019 medical oncology office visit: Preliminary path report shows T-cell rich B-cell non-Hodgkin's lymphoma.  While awaiting final pathology we will get her port and echocardiogram and plan on treating her with Rituxan CHOP with Neulasta device for her stage IV high-grade lymphoma.  She has a high risk IPI score.  We will get her LDH and uric acid today.  Risks including renal dysfunction, nausea, neuropathy, pancytopenia, cardiac dysfunction, infusion reactions, etc. were discussed in detail in addition to additional side effects as standard per all these medicines.  She knows that if this is the T-cell rich B-cell lymphoma that we treat this like a diffuse large B-cell lymphoma and that there is a possibility but not a guarantee of cure and it is not the majority in this setting but that plan and the statistics may change if the final pathology changes.  She will see my nurse practitioner for chemotherapy preparation visit and I will see her in a couple of weeks to get started.    I discussed with patient 1 hour face-to-face greater than 50% spent counseling regarding complexity of this process.    Ranulfo Kline MD    10/07/2019      "

## 2019-10-08 ENCOUNTER — LAB (OUTPATIENT)
Dept: LAB | Facility: HOSPITAL | Age: 69
End: 2019-10-08

## 2019-10-08 ENCOUNTER — DOCUMENTATION (OUTPATIENT)
Dept: ONCOLOGY | Facility: CLINIC | Age: 69
End: 2019-10-08

## 2019-10-08 ENCOUNTER — OFFICE VISIT (OUTPATIENT)
Dept: ONCOLOGY | Facility: CLINIC | Age: 69
End: 2019-10-08

## 2019-10-08 VITALS
BODY MASS INDEX: 27.25 KG/M2 | OXYGEN SATURATION: 94 % | TEMPERATURE: 98.7 F | DIASTOLIC BLOOD PRESSURE: 61 MMHG | RESPIRATION RATE: 14 BRPM | HEART RATE: 88 BPM | SYSTOLIC BLOOD PRESSURE: 135 MMHG | WEIGHT: 149 LBS

## 2019-10-08 DIAGNOSIS — C83.38 DIFFUSE LARGE B-CELL LYMPHOMA OF LYMPH NODES OF MULTIPLE REGIONS (HCC): ICD-10-CM

## 2019-10-08 LAB
HBV SURFACE AB SER RIA-ACNC: REACTIVE
HBV SURFACE AG SERPL QL IA: NORMAL

## 2019-10-08 PROCEDURE — 99215 OFFICE O/P EST HI 40 MIN: CPT | Performed by: NURSE PRACTITIONER

## 2019-10-08 PROCEDURE — 36415 COLL VENOUS BLD VENIPUNCTURE: CPT

## 2019-10-08 PROCEDURE — 86706 HEP B SURFACE ANTIBODY: CPT

## 2019-10-08 PROCEDURE — 86704 HEP B CORE ANTIBODY TOTAL: CPT

## 2019-10-08 PROCEDURE — 87340 HEPATITIS B SURFACE AG IA: CPT

## 2019-10-08 RX ORDER — ALLOPURINOL 300 MG/1
300 TABLET ORAL DAILY
Qty: 30 TABLET | Refills: 0 | Status: ON HOLD | OUTPATIENT
Start: 2019-10-08 | End: 2019-11-14 | Stop reason: SDUPTHER

## 2019-10-08 NOTE — PROGRESS NOTES
CHEMOTHERAPY PREPARATION    Nell Montez  [id number]  1950    Chief Complaint: Chemotherapy preparation    History of present illness:  Nell Montez is a 69 y.o. year old female who is here today for chemotherapy preparation and needs assessment. The patient has been diagnosed with T-cell rich B-cell lymphoma and is scheduled to begin treatment with R-CHOP along with Neulasta support.     Oncology History:    Oncology/Hematology History    1. History of benign transvaginal hysterectomy 1/1/2016  2. History of reflux  3. History of transient global amnesia  4. IV dye allergy as well as allergy to barium enema  5. T-cell rich B-cell lymphoma with  Anterior mediastinal and subcarinal node, Right lower lobe nodule, Head of pancreas uptake, Paraesophageal node uptake,Liver metastases, Splenic hilar metastases, Bilateral adrenal gland uptake, Mesenteric/right retroperitoneal/right iliac uptake, Spine, right shoulder, right rib, sacrum, right hip metastases, Periumbilical subcutaneous nodule, Extensive periportal and periaortic lymphadenopathy with umbilical node excision showing T-cell rich B-cell lymphoma most likely, the second opinion to [place] pending.    -9/26/2019 [facility] oncology consultation: Oncologic history as follows  1/1/2016 vaginal hysterectomy for chronic cystic cervicitis and weakly proliferative endometrium with endometritis and a leiomyoma benign.  6/8/2016 colonoscopy benign  1/2019 apparently had CT screening of the chest that showed a couple of liver abnormalities (do not have those reports).  Unable to do with contrast and IV dye allergy.  Asymptomatic.  9/5/2019 CT of the abdomen without contrast shows at least 2 hepatic lesions, 4 cm in the right lobe of the liver and 2 cm in the left lobe of the liver.  9/11/2019 PET scan shows too numerous to count increased hypermetabolism in the anterior mediastinal, subcarinal, right lower lobe, head of the pancreas, esophagus, adjacent  "paraesophageal lymph nodes, liver, splenic hilum, left and right adrenal glands.  Also uptake in the mesentery, right retroperitoneum, right iliac chain, periumbilical subcutaneous fat, vaginal stump, and numerous bony lesions.  The bony lesions include the spine, right shoulder, right rib, sacrum, and right hip.  The 1.3 cm periumbilical subcutaneous nodule appears most amenable to biopsy.  Extensive periportal and periaortic lymphadenopathy with evidence of peritoneal metastasis for which PET CT was recommended.  9/18/2019 CT-guided fine-needle aspirate of nodule within the anterior abdominal wall \"atypical\" but unable to further typify.  9/26/2019 Dr. Kline initial visit.  Reviewed PET with patient and worrisome but nondiagnostic fine-needle aspirate.  Going to get sufficient tissue not only for diagnosis but for molecular testing that could include Biotheranostics to distinguish tissue type and 15 slides for Strata testing.  We will get our navigator on board as well.  She is having some nighttime sweats without fever though she does not describe them as bed drenching but this is new for her in the last month.  The range of possibilities here are broad and include lymphoma as well as a multiplicity of carcinomas but I need more than \"atypical\" cells on a fine-needle aspirate to ultimately decide our course.  We did talk about the likelihood that what ever we are up against it is unlikely to be curative but we will wait out her risk benefit ratio once we know the pathology.  Though they may ultimately not be helpful, while we are getting tissue I will go ahead and send off a barrage of tumor markers that might help guide us to the primary.    -10/7/2019 medical oncology office visit: Preliminary path report shows T-cell rich B-cell non-Hodgkin's lymphoma.  While awaiting final pathology we will get her port and echocardiogram and plan on treating her with Rituxan CHOP with Neulasta device for her stage IV " "high-grade lymphoma.  She has a high risk IPI score.  We will get her LDH and uric acid today.  Risks including renal dysfunction, nausea, neuropathy, pancytopenia, cardiac dysfunction, infusion reactions, etc. were discussed in detail in addition to additional side effects as standard per all these medicines.  She knows that if this is the T-cell rich B-cell lymphoma that we treat this like a diffuse large B-cell lymphoma and that there is a possibility but not a guarantee of cure and it is not the majority in this setting but that plan and the statistics may change if the final pathology changes.        Liver metastases (CMS/HCC)    9/26/2019 Initial Diagnosis     Liver metastases (CMS/HCC)            Past Medical History:   Diagnosis Date   • Abnormal Pap smear of vagina     \"Abnormal pap smear\"   • Anxiety    • Cervical dysplasia    • Cystocele    • GERD (gastroesophageal reflux disease)     INTERMITTENT- NOT MEDICATION   • Migraine headache    • Osteopenia    • Transient global amnesia     fall 2015       Past Surgical History:   Procedure Laterality Date   • BREAST CYST ASPIRATION     • BREAST SURGERY      biopsy x 3   • CERVICAL BIOPSY      PT UNAWARE OF CERVIX BIOPSY   • IMPACTED THIRD MOLAR REMOVAL      wisdom teeth extracted in which two partially impacted   • OTHER SURGICAL HISTORY  10/02/2019    surgical bx of abd wall tumor   • TONSILLECTOMY     • VAGINAL HYSTERECTOMY      with anterior repair   • VAGINAL HYSTERECTOMY W/ ANTERIOR AND POSTERIOR VAGINAL REPAIR N/A 11/15/2016    Procedure: VAGINAL HYSTERECTOMY WITH ANTERIOR VAGINAL REPAIR;  Surgeon: Henry Pitt MD;  Location: [facility];  Service:        MEDICATIONS: The current medication list was reviewed and reconciled.     Allergies:  is allergic to betadine [povidone iodine]; codeine; e-mycin [erythromycin]; epinephrine; iodine; other; procaine; and sudafed [pseudoephedrine hcl].    Family History   Problem Relation Age of Onset   • Other Mother "         cva   • Other Father         autoimmune disease   • Rheum arthritis Daughter    • Other Daughter         ITP   • Thyroid disease Daughter    • Tuberculosis Maternal Grandmother    • Prostate cancer Maternal Grandfather    • Brain cancer Cousin    • Breast cancer Neg Hx    • Ovarian cancer Neg Hx          Review of Systems   Constitutional: Negative for fatigue, fever and unexpected weight change.   HENT: Negative for congestion, hearing loss, sore throat and trouble swallowing.    Eyes: Negative for visual disturbance.   Respiratory: Negative for cough, shortness of breath and wheezing.    Cardiovascular: Negative for chest pain and leg swelling.   Gastrointestinal: Negative for abdominal distention, abdominal pain, constipation, diarrhea, nausea and vomiting.   Endocrine: Negative.    Genitourinary: Negative.    Musculoskeletal: Negative for arthralgias, back pain and gait problem.   Skin: Negative.    Allergic/Immunologic: Negative.    Neurological: Negative for dizziness, weakness, numbness and headaches.   Hematological: Negative for adenopathy. Does not bruise/bleed easily.   Psychiatric/Behavioral: Negative.    All other systems reviewed and are negative.      Physical Exam  Vital Signs: /61   Pulse 88   Temp 98.7 °F (37.1 °C) (Temporal)   Resp 14   Wt 67.6 kg (149 lb)   SpO2 94%   BMI 27.25 kg/m²    General Appearance:  alert, cooperative, no apparent distress, appears stated age and normal weight   Neurologic/Psychiatric: A&O x 3, gait steady, appropriate affect   HEENT:  Normocephalic, without obvious abnormality, mucous membranes moist   Lungs:   Clear to auscultation bilaterally; respirations regular, even, and unlabored bilaterally   Heart:  Regular rate and rhythm, no murmurs appreciated   Extremities: Normal, atraumatic; no clubbing, cyanosis, or edema    Skin: No rashes, lesions, or abnormal coloration noted     ECOG Performance Status: (0) Fully active, able to carry on all  predisease performance without restriction          NEEDS ASSESSMENTS    Genetics  The patient's new diagnosis and family history have been reviewed for genetic counseling needs. A genetic referral is not recommended.     Psychosocial  The patient has completed a PHQ-9 Depression Screening and the Distress Thermometer (DT) today.   PHQ-9 results show 1-4 (Minimal Depression). The patient scored their distress today as 3 on a scale of 0-10 with 0 being no distress and 10 being extreme distress.   Problems marked by the patient as being an issue for them within the last week include emotional problems and physical problems.   Results were reviewed along with psychosocial resources offered by our cancer center. Our oncology social worker will be flagged for a DT score of 4 or above, and a same day call will be made for a score of 9 or 10. A mental health referral is not recommended at this time. The patient is not accepting of a referral to [facility].   Copies of patient's questionnaires will be scanned into EMR for details and further reference.    Barriers to care  A barriers form was also completed by the patient today. We discussed services offered by our facility to help her have adequate access to care. The patient was given the name and card for our Oncology Social Worker, Sandie Woodward. Based upon barriers assessment today, the patient will not require a follow-up call from the  to further discuss needs.   A copy of the barriers form will also be scanned into EMR for details and further reference.     VAD Assessment  The patient and I discussed planned intervenous chemotherapy as well as other IV treatments that are often needed throughout the course of treatment. These may include, but are not limited to blood transfusions, antibiotics, and IV hydration. The vasculature does not appear to be adequate for multiple peripheral IVs throughout their treatment course. Discussed risks and  "benefits of VADs. The patient would like to pursue Port-A-Cath insertion prior to initiation of treatment.     Advance Care Planning   Advance Care Planning Discussion:  Advanced Care Planning  The patient and I discussed advanced care planning, \"Conversations that Matter\".   This service was offered, free of charge, for development of advance directives with a certified ACP facilitator.  The patient does not have an up-to-date advanced directive. This document is not on file with our office. The patient is not interested in an appointment with one of our facilitators to create or update their advanced directives.              Palliative Care  The patient and I discussed palliative care services. Palliative care is not the same as Hospice care. This is specialized medical care for people living with serious illness with the goal of improving quality of life for the patient and their family. [facility] has partnered with Psychiatric Navigators to offer our patients outpatient palliative care early along with their treatment to assist in coordination of care, symptom management, pain management, and medical decision making.  Oncology criteria for palliative care referral is not met at this time. The patient is not interested in a palliative care consultation.     Additional Referral needs  none      CHEMOTHERAPY EDUCATION    Booklets Given: Chemotherapy and You [x]  Eating Hints [x]    Sexuality/Fertility Books []      Chemotherapy/Biotherapy Education Sheets: (list all that apply)  nausea management, acid reflux management, diarrhea management, Cancer resourse contacts information, skin and mouth care and vaccination information                                                                                                                                                                 Chemotherapy Regimen:   Treatment Plans     Name Type Plan dates Plan Provider         Active    OP LYMPHOMA R-CHOP RiTUXimab / " Cyclophosphamide / DOXOrubicin / VinCRIStine / PredniSONE / Pegfilgrastim ONCOLOGY TREATMENT  10/20/2019 - Present Ranulfo Kline MD                    TOPICS EDUCATION PROVIDED COMMENTS   ANEMIA:  role of RBC, cause, s/s, ways to manage, role of transfusion [x]    THROMBOCYTOPENIA:  role of platelet, cause, s/s, ways to prevent bleeding, things to avoid, when to seek help [x]    NEUTROPENIA:  role of WBC, cause, infection precautions, s/s of infection, when to call MD [x]  we discussed Neulasta   NUTRITION & APPETITE CHANGES:  importance of maintaining healthy diet & weight, ways to manage to improve intake, dietary consult, exercise regimen [x]    DIARRHEA:  causes, s/s of dehydration, ways to manage, dietary changes, when to call MD [x]  we reviewed over-the-counter medications for diarrhea   CONSTIPATION:  causes, ways to manage, dietary changes, when to call MD [x]  we reviewed over-the-counter medications for constipation   NAUSEA & VOMITING:  cause, use of antiemetics, dietary changes, when to call MD [x]  prescription has been sent for Zofran   MOUTH SORES:  causes, oral care, ways to manage [x]    ALOPECIA:  cause, ways to manage, resources [x]    INFERTILITY & SEXUALITY:  causes, fertility preservation options, sexuality changes, ways to manage, importance of birth control []    NERVOUS SYSTEM CHANGES:  causes, s/s, neuropathies, cognitive changes, ways to manage [x]    PAIN:  causes, ways to manage [x] ????   SKIN & NAIL CHANGES:  cause, s/s, ways to manage [x]    ORGAN TOXICITIES:  cause, s/s, need for diagnostic tests, labs, when to notify MD [x]    HOME CARE:  use of spill kits, storing of PO chemo, how to manage bodily fluids [x]    MISCELLANEOUS:  drug interactions, administration, vesicant, et [x]  we discussed tumor lysis syndrome, prescription for allopurinol was sent.       Assessment and Plan:    Diagnoses and all orders for this visit:    Diffuse large B-cell lymphoma of lymph nodes of multiple  regions (CMS/HCC)  -     Hepatitis B Core Antibody, Total; Future  -     Hepatitis B Surface Antigen; Future  -     Hepatitis B surface antibody; Future        This was a 60 minute face-to-face visit with 50 minutes spent in  counseling and coordination of care as documented above.   The patient and I have reviewed their new cancer diagnosis and scheduled treatment plan. Needs assessment was completed including genetics, psychosocial needs, barriers to care, VAD evaluation, advanced care planning, and palliative care services. Referrals have been ordered as appropriate based upon our evaluation and patient desires.     Chemotherapy teaching was also completed today as documented above. Adequate time was given to answer all questions to her satisfaction. Patient and family are aware of their care team members and contact information if they have questions or problems throughout the treatment course. Needs assessments and education has been completed. The patient is adequately prepared to begin treatment as scheduled.       Manju Schulz, APRN    10/08/2019

## 2019-10-09 LAB — HBV CORE AB SER DONR QL IA: NEGATIVE

## 2019-10-09 NOTE — PROGRESS NOTES
10/8/2019-I saw patient after she had appointment with COLLEEN Palacio, for chemo teaching. Patient had called around 11:30am and had left me a voicemail asking me to stop by while she was seeing Manju and so I did. Patient was telling me what her path report showed from Dr. Swenson doing a biopsy on a umbilical mass. Patient said that Dr. Swenson would be placing a port for her. I told patient that the port was a great device to have and educated her on putting Emla cream on top of port and cover about 30-45 minutes before her chemo days. Patient still very anxious about the unknown of chemotherapy. I encouraged patient to communicate her needs/side effects if she has any problems. Patient verbalized understanding. I walked patient over to the Med Onc lab for a lab draw. I encouraged patient to call me for any navigational needs. AG

## 2019-10-11 ENCOUNTER — HOSPITAL ENCOUNTER (OUTPATIENT)
Dept: CARDIOLOGY | Facility: HOSPITAL | Age: 69
Discharge: HOME | End: 2019-10-11
Admitting: INTERNAL MEDICINE

## 2019-10-11 VITALS — HEIGHT: 62 IN | BODY MASS INDEX: 27.42 KG/M2 | WEIGHT: 149 LBS

## 2019-10-11 DIAGNOSIS — C78.7 LIVER METASTASES: ICD-10-CM

## 2019-10-11 DIAGNOSIS — Z51.11 ENCOUNTER FOR ANTINEOPLASTIC CHEMOTHERAPY: ICD-10-CM

## 2019-10-11 DIAGNOSIS — C83.38 DIFFUSE LARGE B-CELL LYMPHOMA OF LYMPH NODES OF MULTIPLE REGIONS (HCC): ICD-10-CM

## 2019-10-11 LAB
BH CV ECHO MEAS - AO ROOT AREA (BSA CORRECTED): 1.8
BH CV ECHO MEAS - AO ROOT AREA: 7.5 CM^2
BH CV ECHO MEAS - AO ROOT DIAM: 3.1 CM
BH CV ECHO MEAS - BSA(HAYCOCK): 1.7 M^2
BH CV ECHO MEAS - BSA: 1.7 M^2
BH CV ECHO MEAS - BZI_BMI: 27.4 KILOGRAMS/M^2
BH CV ECHO MEAS - BZI_METRIC_HEIGHT: 157 CM
BH CV ECHO MEAS - BZI_METRIC_WEIGHT: 67.6 KG
BH CV ECHO MEAS - EDV(CUBED): 46.7 ML
BH CV ECHO MEAS - EDV(MOD-SP2): 79.9 ML
BH CV ECHO MEAS - EDV(MOD-SP4): 101 ML
BH CV ECHO MEAS - EDV(TEICH): 54.4 ML
BH CV ECHO MEAS - EF(CUBED): 52.9 %
BH CV ECHO MEAS - EF(MOD-BP): 58.4 %
BH CV ECHO MEAS - EF(MOD-SP2): 60.1 %
BH CV ECHO MEAS - EF(MOD-SP4): 57.3 %
BH CV ECHO MEAS - EF(TEICH): 45.7 %
BH CV ECHO MEAS - ESV(CUBED): 22 ML
BH CV ECHO MEAS - ESV(MOD-SP2): 31.9 ML
BH CV ECHO MEAS - ESV(MOD-SP4): 43.1 ML
BH CV ECHO MEAS - ESV(TEICH): 29.6 ML
BH CV ECHO MEAS - FS: 22.2 %
BH CV ECHO MEAS - IVS/LVPW: 1.1
BH CV ECHO MEAS - IVSD: 1.2 CM
BH CV ECHO MEAS - LA DIMENSION: 2.5 CM
BH CV ECHO MEAS - LA/AO: 0.81
BH CV ECHO MEAS - LAD MAJOR: 5.3 CM
BH CV ECHO MEAS - LAT PEAK E' VEL: 7.7 CM/SEC
BH CV ECHO MEAS - LATERAL E/E' RATIO: 6.1
BH CV ECHO MEAS - LV DIASTOLIC VOL/BSA (35-75): 60 ML/M^2
BH CV ECHO MEAS - LV MASS(C)D: 132.7 GRAMS
BH CV ECHO MEAS - LV MASS(C)DI: 78.8 GRAMS/M^2
BH CV ECHO MEAS - LV MAX PG: 6.5 MMHG
BH CV ECHO MEAS - LV MEAN PG: 3 MMHG
BH CV ECHO MEAS - LV SYSTOLIC VOL/BSA (12-30): 25.6 ML/M^2
BH CV ECHO MEAS - LV V1 MAX: 127 CM/SEC
BH CV ECHO MEAS - LV V1 MEAN: 76.8 CM/SEC
BH CV ECHO MEAS - LV V1 VTI: 22.9 CM
BH CV ECHO MEAS - LVIDD: 3.6 CM
BH CV ECHO MEAS - LVIDS: 2.8 CM
BH CV ECHO MEAS - LVLD AP2: 8.1 CM
BH CV ECHO MEAS - LVLD AP4: 7.9 CM
BH CV ECHO MEAS - LVLS AP2: 6.6 CM
BH CV ECHO MEAS - LVLS AP4: 6.9 CM
BH CV ECHO MEAS - LVOT AREA (M): 3.1 CM^2
BH CV ECHO MEAS - LVOT AREA: 3.1 CM^2
BH CV ECHO MEAS - LVOT DIAM: 2 CM
BH CV ECHO MEAS - LVPWD: 1.1 CM
BH CV ECHO MEAS - MED PEAK E' VEL: 6.6 CM/SEC
BH CV ECHO MEAS - MEDIAL E/E' RATIO: 7.1
BH CV ECHO MEAS - MV A MAX VEL: 72.2 CM/SEC
BH CV ECHO MEAS - MV DEC TIME: 0.21 SEC
BH CV ECHO MEAS - MV E MAX VEL: 47 CM/SEC
BH CV ECHO MEAS - MV E/A: 0.65
BH CV ECHO MEAS - PA ACC TIME: 0.05 SEC
BH CV ECHO MEAS - PA PR(ACCEL): 56.5 MMHG
BH CV ECHO MEAS - RVDD: 3.6 CM
BH CV ECHO MEAS - SI(CUBED): 14.7 ML/M^2
BH CV ECHO MEAS - SI(LVOT): 42.7 ML/M^2
BH CV ECHO MEAS - SI(MOD-SP2): 28.5 ML/M^2
BH CV ECHO MEAS - SI(MOD-SP4): 34.4 ML/M^2
BH CV ECHO MEAS - SI(TEICH): 14.8 ML/M^2
BH CV ECHO MEAS - SV(CUBED): 24.7 ML
BH CV ECHO MEAS - SV(LVOT): 71.9 ML
BH CV ECHO MEAS - SV(MOD-SP2): 48 ML
BH CV ECHO MEAS - SV(MOD-SP4): 57.9 ML
BH CV ECHO MEAS - SV(TEICH): 24.9 ML
BH CV ECHO MEAS - TAPSE (>1.6): 2.11 CM2
BH CV ECHO MEASUREMENTS AVERAGE E/E' RATIO: 6.57
BH CV VAS BP LEFT ARM: NORMAL MMHG
BH CV XLRA - RV BASE: 3.6 CM
BH CV XLRA - RV LENGTH: 4.7 CM
BH CV XLRA - RV MID: 3.3 CM
BH CV XLRA - TDI S': 12.9 CM/SEC
LEFT ATRIUM VOLUME INDEX: 27.7 ML/M2
MAXIMAL PREDICTED HEART RATE: 149 BPM
STRESS TARGET HR: 127 BPM

## 2019-10-11 PROCEDURE — 93306 TTE W/DOPPLER COMPLETE: CPT

## 2019-10-11 PROCEDURE — 93306 TTE W/DOPPLER COMPLETE: CPT | Performed by: INTERNAL MEDICINE

## 2019-10-15 ENCOUNTER — DOCUMENTATION (OUTPATIENT)
Dept: ONCOLOGY | Facility: CLINIC | Age: 69
End: 2019-10-15

## 2019-10-15 ENCOUNTER — TRANSCRIBE ORDERS (OUTPATIENT)
Dept: ADMINISTRATIVE | Facility: HOSPITAL | Age: 69
End: 2019-10-15

## 2019-10-15 DIAGNOSIS — C83.38 DIFFUSE LARGE B-CELL LYMPHOMA, LYMPH NODES OF MULTIPLE SITES (HCC): Primary | ICD-10-CM

## 2019-10-15 NOTE — PROGRESS NOTES
Patient called me telling me about her getting her port placed tomorrow. Patient mentioned the cooling cap to me, saying that she wanted to do whatever she could to keep her hair. Isabel Patel, RN, went and got Kelley and she came in our office and said that she could see patient today at 2pm. Kelley asked that patient bring her most recent taxes with her and I let patient know. Kelley and I went over and talked with Manju Schulz about how many treatments. Manju asked that I send her an Epic in-box about patient and so I did. Kelley and I also mentioned that patient is coming in at 2pm to talk with Kelley and if everything checked out okay, then patient would need to be measured for the right size cap.    Patient and I also discussed mouth sores and use of warm salt water with the option of magic mouthwash if needed. I also went over patient communicating with RN during her infusion for anything that she might feel differently during infusion of chemo drugs. We went over patient getting some premeds to prevent a possible reactions that might occur if she want' getting the premeds. Patient is asking about her steroids and when to take them. She said that her instructions from another person said to bring the steroids with her to her infusion. I also went over putting the Emla cream on top of her port and putting jarocho/christin wrap over cream. AG

## 2019-10-15 NOTE — PROGRESS NOTES
Kelley faxed in patient's information to assistance with the Cooling Cap. Patient is wanting to proceed with the cap. I will continue to be available to navigate patient. Kelley will get back with patient when she hears from the Hair to Stay. AG

## 2019-10-15 NOTE — PROGRESS NOTES
Patient got here around 2:10pm today. Kelley saw patient, went over finances and took patient over to [facility] in Med Onc to get patient fitted for her cap. I talked with Jaquan,  of Oncology, and he gave me an I-pad so patient could get educated about the Cooling Cap. I also got her a packet with information on the use of the cooling cap. Patient is going to let Kelley know when she gets done with the educational modules on I-pad. Kelley is waiting on patient's tax place to send patient her 2018 taxes that will help with filling out the information for financial assist with the cooling cap. AG

## 2019-10-16 ENCOUNTER — HOSPITAL ENCOUNTER (OUTPATIENT)
Dept: GENERAL RADIOLOGY | Facility: HOSPITAL | Age: 69
Discharge: HOME | End: 2019-10-16

## 2019-10-16 DIAGNOSIS — C83.38 DIFFUSE LARGE B-CELL LYMPHOMA, LYMPH NODES OF MULTIPLE SITES (HCC): ICD-10-CM

## 2019-10-16 PROCEDURE — 76000 FLUOROSCOPY <1 HR PHYS/QHP: CPT

## 2019-10-16 PROCEDURE — 77001 FLUOROGUIDE FOR VEIN DEVICE: CPT

## 2019-10-16 PROCEDURE — 71045 X-RAY EXAM CHEST 1 VIEW: CPT

## 2019-10-18 ENCOUNTER — DOCUMENTATION (OUTPATIENT)
Dept: ONCOLOGY | Facility: CLINIC | Age: 69
End: 2019-10-18

## 2019-10-18 NOTE — PROGRESS NOTES
"I talked with patient today. Patient very anxious about the \"unknown\" of the chemo. I sent COLLEEN Palacio, an Epic message letting her know that patient is very anxious and requested something to relax her that first day of chemotherapy. Patient's daughter is coming to town to take her to her first chemotherapy treatment. I let patient know that she could eat the morning of chemotherapy and that she would be provided a lunch during her treatment. Patient and I also talked with rinsing mouth with warm salt water for prevent mouth sores. Patient does have zofran to take for nausea if needed. She also has protein drinks available in her home. I answered all patient questions and let her know that I would be over to see her the first day of chemotherapy. Patient wanted me to let Manju Zeus know that her has found out that she has auto-immune diseases on both sides of her family. I did notify Manju per Epic email as the patient requested. Sandie Woodward, Oncology Social Worker, is planning on seeing patient on her first treatment date. AG  "

## 2019-10-21 ENCOUNTER — HOSPITAL ENCOUNTER (OUTPATIENT)
Dept: ONCOLOGY | Facility: HOSPITAL | Age: 69
Setting detail: INFUSION SERIES
Discharge: HOME | End: 2019-10-21

## 2019-10-21 ENCOUNTER — DOCUMENTATION (OUTPATIENT)
Dept: SOCIAL WORK | Facility: HOSPITAL | Age: 69
End: 2019-10-21

## 2019-10-21 ENCOUNTER — OFFICE VISIT (OUTPATIENT)
Dept: ONCOLOGY | Facility: CLINIC | Age: 69
End: 2019-10-21

## 2019-10-21 ENCOUNTER — DOCUMENTATION (OUTPATIENT)
Dept: NUTRITION | Facility: HOSPITAL | Age: 69
End: 2019-10-21

## 2019-10-21 ENCOUNTER — DOCUMENTATION (OUTPATIENT)
Dept: ONCOLOGY | Facility: CLINIC | Age: 69
End: 2019-10-21

## 2019-10-21 VITALS
TEMPERATURE: 98.7 F | DIASTOLIC BLOOD PRESSURE: 66 MMHG | SYSTOLIC BLOOD PRESSURE: 130 MMHG | OXYGEN SATURATION: 94 % | RESPIRATION RATE: 16 BRPM | HEIGHT: 62 IN | HEART RATE: 90 BPM | BODY MASS INDEX: 27.42 KG/M2 | WEIGHT: 149 LBS

## 2019-10-21 VITALS — DIASTOLIC BLOOD PRESSURE: 47 MMHG | SYSTOLIC BLOOD PRESSURE: 117 MMHG | HEART RATE: 88 BPM

## 2019-10-21 DIAGNOSIS — C83.38 DIFFUSE LARGE B-CELL LYMPHOMA OF LYMPH NODES OF MULTIPLE REGIONS (HCC): Chronic | ICD-10-CM

## 2019-10-21 DIAGNOSIS — C83.38 DIFFUSE LARGE B-CELL LYMPHOMA OF LYMPH NODES OF MULTIPLE REGIONS (HCC): Primary | ICD-10-CM

## 2019-10-21 DIAGNOSIS — C78.7 LIVER METASTASES: Primary | Chronic | ICD-10-CM

## 2019-10-21 DIAGNOSIS — Z45.2 ENCOUNTER FOR ADJUSTMENT OR MANAGEMENT OF VASCULAR ACCESS DEVICE: ICD-10-CM

## 2019-10-21 LAB
ALBUMIN SERPL-MCNC: 4.2 G/DL (ref 3.5–5.2)
ALBUMIN/GLOB SERPL: 1.9 G/DL
ALP SERPL-CCNC: 84 U/L (ref 39–117)
ALT SERPL W P-5'-P-CCNC: 25 U/L (ref 1–33)
ANION GAP SERPL CALCULATED.3IONS-SCNC: 9 MMOL/L (ref 5–15)
AST SERPL-CCNC: 27 U/L (ref 1–32)
BILIRUB SERPL-MCNC: 0.6 MG/DL (ref 0.2–1.2)
BUN BLD-MCNC: 18 MG/DL (ref 8–23)
BUN/CREAT SERPL: 31 (ref 7–25)
CALCIUM SPEC-SCNC: 9.5 MG/DL (ref 8.6–10.5)
CHLORIDE SERPL-SCNC: 104 MMOL/L (ref 98–107)
CO2 SERPL-SCNC: 27 MMOL/L (ref 22–29)
CREAT BLD-MCNC: 0.58 MG/DL (ref 0.57–1)
ERYTHROCYTE [DISTWIDTH] IN BLOOD BY AUTOMATED COUNT: 13 % (ref 12.3–15.4)
GFR SERPL CREATININE-BSD FRML MDRD: 102 ML/MIN/1.73
GLOBULIN UR ELPH-MCNC: 2.2 GM/DL
GLUCOSE BLD-MCNC: 91 MG/DL (ref 65–99)
HCT VFR BLD AUTO: 35.1 % (ref 34–46.6)
HGB BLD-MCNC: 11.7 G/DL (ref 12–15.9)
LYMPHOCYTES # BLD AUTO: 2.4 10*3/MM3 (ref 0.7–3.1)
LYMPHOCYTES NFR BLD AUTO: 35.3 % (ref 19.6–45.3)
MCH RBC QN AUTO: 31.7 PG (ref 26.6–33)
MCHC RBC AUTO-ENTMCNC: 33.4 G/DL (ref 31.5–35.7)
MCV RBC AUTO: 95.1 FL (ref 79–97)
MONOCYTES # BLD AUTO: 0.5 10*3/MM3 (ref 0.1–0.9)
MONOCYTES NFR BLD AUTO: 7.7 % (ref 5–12)
NEUTROPHILS # BLD AUTO: 3.9 10*3/MM3 (ref 1.7–7)
NEUTROPHILS NFR BLD AUTO: 57 % (ref 42.7–76)
PLATELET # BLD AUTO: 214 10*3/MM3 (ref 140–450)
PMV BLD AUTO: 6.9 FL (ref 6–12)
POTASSIUM BLD-SCNC: 3.7 MMOL/L (ref 3.5–5.2)
PROT SERPL-MCNC: 6.4 G/DL (ref 6–8.5)
RBC # BLD AUTO: 3.69 10*6/MM3 (ref 3.77–5.28)
SODIUM BLD-SCNC: 140 MMOL/L (ref 136–145)
WBC NRBC COR # BLD: 6.8 10*3/MM3 (ref 3.4–10.8)

## 2019-10-21 PROCEDURE — 99215 OFFICE O/P EST HI 40 MIN: CPT | Performed by: INTERNAL MEDICINE

## 2019-10-21 PROCEDURE — 25010000002 RITUXIMAB 10 MG/ML SOLUTION 10 ML VIAL: Performed by: INTERNAL MEDICINE

## 2019-10-21 PROCEDURE — 25010000002 DOXORUBICIN PER 10 MG: Performed by: INTERNAL MEDICINE

## 2019-10-21 PROCEDURE — 96413 CHEMO IV INFUSION 1 HR: CPT

## 2019-10-21 PROCEDURE — 96375 TX/PRO/DX INJ NEW DRUG ADDON: CPT

## 2019-10-21 PROCEDURE — 25010000002 FOSAPREPITANT PER 1 MG: Performed by: INTERNAL MEDICINE

## 2019-10-21 PROCEDURE — 96367 TX/PROPH/DG ADDL SEQ IV INF: CPT

## 2019-10-21 PROCEDURE — 25010000002 DEXAMETHASONE PER 1 MG: Performed by: INTERNAL MEDICINE

## 2019-10-21 PROCEDURE — 96415 CHEMO IV INFUSION ADDL HR: CPT

## 2019-10-21 PROCEDURE — 25010000002 PEGFILGRASTIM 6 MG/0.6ML PREFILLED SYRINGE KIT: Performed by: INTERNAL MEDICINE

## 2019-10-21 PROCEDURE — 25010000002 DIPHENHYDRAMINE PER 50 MG: Performed by: INTERNAL MEDICINE

## 2019-10-21 PROCEDURE — 96417 CHEMO IV INFUS EACH ADDL SEQ: CPT

## 2019-10-21 PROCEDURE — 96411 CHEMO IV PUSH ADDL DRUG: CPT

## 2019-10-21 PROCEDURE — 25010000002 RITUXIMAB 10 MG/ML SOLUTION 50 ML VIAL: Performed by: INTERNAL MEDICINE

## 2019-10-21 PROCEDURE — 25010000002 VINCRISTINE PER 1 MG: Performed by: INTERNAL MEDICINE

## 2019-10-21 PROCEDURE — 25010000002 LORAZEPAM PER 2 MG: Performed by: INTERNAL MEDICINE

## 2019-10-21 PROCEDURE — 25010000002 PALONOSETRON 0.25 MG/5ML SOLUTION PREFILLED SYRINGE: Performed by: INTERNAL MEDICINE

## 2019-10-21 PROCEDURE — 80053 COMPREHEN METABOLIC PANEL: CPT | Performed by: INTERNAL MEDICINE

## 2019-10-21 PROCEDURE — 85025 COMPLETE CBC W/AUTO DIFF WBC: CPT | Performed by: INTERNAL MEDICINE

## 2019-10-21 PROCEDURE — 25010000002 CYCLOPHOSPHAMIDE PER 100 MG: Performed by: INTERNAL MEDICINE

## 2019-10-21 PROCEDURE — 96377 APPLICATON ON-BODY INJECTOR: CPT

## 2019-10-21 RX ORDER — ACETAMINOPHEN 325 MG/1
650 TABLET ORAL ONCE
Status: COMPLETED | OUTPATIENT
Start: 2019-10-21 | End: 2019-10-21

## 2019-10-21 RX ORDER — FAMOTIDINE 10 MG/ML
20 INJECTION, SOLUTION INTRAVENOUS AS NEEDED
Status: CANCELLED | OUTPATIENT
Start: 2019-10-21

## 2019-10-21 RX ORDER — ACETAMINOPHEN 325 MG/1
650 TABLET ORAL ONCE
Status: CANCELLED | OUTPATIENT
Start: 2019-10-21

## 2019-10-21 RX ORDER — LORAZEPAM 2 MG/ML
0.5 INJECTION INTRAMUSCULAR AS NEEDED
Status: CANCELLED | OUTPATIENT
Start: 2019-10-21

## 2019-10-21 RX ORDER — SODIUM CHLORIDE 0.9 % (FLUSH) 0.9 %
10 SYRINGE (ML) INJECTION AS NEEDED
Status: DISCONTINUED | OUTPATIENT
Start: 2019-10-21 | End: 2019-10-22 | Stop reason: HOSPADM

## 2019-10-21 RX ORDER — DOXORUBICIN HYDROCHLORIDE 2 MG/ML
50 INJECTION, SOLUTION INTRAVENOUS ONCE
Status: CANCELLED | OUTPATIENT
Start: 2019-10-21

## 2019-10-21 RX ORDER — DIPHENHYDRAMINE HYDROCHLORIDE 50 MG/ML
50 INJECTION INTRAMUSCULAR; INTRAVENOUS AS NEEDED
Status: CANCELLED | OUTPATIENT
Start: 2019-10-21

## 2019-10-21 RX ORDER — PALONOSETRON 0.05 MG/ML
0.25 INJECTION, SOLUTION INTRAVENOUS ONCE
Status: COMPLETED | OUTPATIENT
Start: 2019-10-21 | End: 2019-10-21

## 2019-10-21 RX ORDER — PALONOSETRON 0.05 MG/ML
0.25 INJECTION, SOLUTION INTRAVENOUS ONCE
Status: CANCELLED | OUTPATIENT
Start: 2019-10-21

## 2019-10-21 RX ORDER — SODIUM CHLORIDE 9 MG/ML
250 INJECTION, SOLUTION INTRAVENOUS ONCE
Status: COMPLETED | OUTPATIENT
Start: 2019-10-21 | End: 2019-10-21

## 2019-10-21 RX ORDER — SODIUM CHLORIDE 9 MG/ML
250 INJECTION, SOLUTION INTRAVENOUS ONCE
Status: CANCELLED | OUTPATIENT
Start: 2019-10-21

## 2019-10-21 RX ORDER — MEPERIDINE HYDROCHLORIDE 50 MG/ML
25 INJECTION INTRAMUSCULAR; INTRAVENOUS; SUBCUTANEOUS
Status: CANCELLED | OUTPATIENT
Start: 2019-10-21 | End: 2019-10-22

## 2019-10-21 RX ORDER — SODIUM CHLORIDE 0.9 % (FLUSH) 0.9 %
10 SYRINGE (ML) INJECTION AS NEEDED
Status: CANCELLED | OUTPATIENT
Start: 2019-10-21

## 2019-10-21 RX ORDER — LORAZEPAM 2 MG/ML
0.5 INJECTION INTRAMUSCULAR AS NEEDED
Status: DISCONTINUED | OUTPATIENT
Start: 2019-10-21 | End: 2019-10-22 | Stop reason: HOSPADM

## 2019-10-21 RX ORDER — DOXORUBICIN HYDROCHLORIDE 2 MG/ML
50 INJECTION, SOLUTION INTRAVENOUS ONCE
Status: COMPLETED | OUTPATIENT
Start: 2019-10-21 | End: 2019-10-21

## 2019-10-21 RX ADMIN — SODIUM CHLORIDE 250 ML: 9 INJECTION, SOLUTION INTRAVENOUS at 10:20

## 2019-10-21 RX ADMIN — ACETAMINOPHEN 650 MG: 325 TABLET ORAL at 10:20

## 2019-10-21 RX ADMIN — DOXORUBICIN HYDROCHLORIDE 86 MG: 2 INJECTION, SOLUTION INTRAVENOUS at 14:48

## 2019-10-21 RX ADMIN — PEGFILGRASTIM 6 MG: KIT SUBCUTANEOUS at 15:51

## 2019-10-21 RX ADMIN — DIPHENHYDRAMINE HYDROCHLORIDE 50 MG: 50 INJECTION INTRAMUSCULAR; INTRAVENOUS at 10:21

## 2019-10-21 RX ADMIN — RITUXIMAB 600 MG: 10 INJECTION, SOLUTION INTRAVENOUS at 10:55

## 2019-10-21 RX ADMIN — CYCLOPHOSPHAMIDE 1280 MG: 1 INJECTION, POWDER, FOR SOLUTION INTRAVENOUS; ORAL at 15:17

## 2019-10-21 RX ADMIN — VINCRISTINE SULFATE 2 MG: 1 INJECTION, SOLUTION INTRAVENOUS at 15:00

## 2019-10-21 RX ADMIN — PALONOSETRON 0.25 MG: 0.25 INJECTION, SOLUTION INTRAVENOUS at 14:14

## 2019-10-21 RX ADMIN — SODIUM CHLORIDE 150 MG: 9 INJECTION, SOLUTION INTRAVENOUS at 14:14

## 2019-10-21 RX ADMIN — HEPARIN SODIUM (PORCINE) LOCK FLUSH IV SOLN 100 UNIT/ML 500 UNITS: 100 SOLUTION at 15:51

## 2019-10-21 RX ADMIN — DEXAMETHASONE SODIUM PHOSPHATE 12 MG: 4 INJECTION, SOLUTION INTRAMUSCULAR; INTRAVENOUS at 14:14

## 2019-10-21 RX ADMIN — LORAZEPAM 0.5 MG: 2 INJECTION INTRAMUSCULAR; INTRAVENOUS at 10:25

## 2019-10-21 NOTE — PROGRESS NOTES
Patient is now in Infusion. NICHOLE Granados, just accessed patient's port. Patient says that she is upset that her chemo has been changed since vincristine is limited. Patient says that she will not be doing the cooling cap due to not getting the vincristine. Patient will get vincristine today and be admitted into the hospital for the next cycle(please see Dr. Kline notes). Patient's daughter is right beside her in the Infusion area. I will continue to check on patient throughout her treatment today. KIANA

## 2019-10-21 NOTE — PROGRESS NOTES
[facility]  Psychosocial Assessment    Diagnosis: Large B-Cell Lymphoma  Reason for Referral:  Distress Screen consult    Treatment:    Chemo: yes    Radiation: no    Insurance coverage:  yes    Marital status:  not     Children:  Yes -5 adult children    Name: Annie Age:    Name:   Age:    Name:   Age:      Home environment:  Pt lives alone in [place]    Support system:  Pt has a supportive family, friends and neighbors    Advanced Directives:  patient has Advance Directive, copy requested    Smoking history:  has never smoked    Spiritual / Cultural / Ethnic background:  No concerns identified.    Emotional / Mental status:  Pt is feeling anxious about her diagnosis and chemotherapy treatments. She has been seeing a therapist for the last couple of months.    Current history of alcoholism / drug abuse:  no    Coping skills: Pt stays active in the community and with her family.       Referrals / Patient Resource provided:  [place], head coverings; [employer]; [place] [facility]; [facility] brochure;[employer] flyer;SW contact information

## 2019-10-21 NOTE — PROGRESS NOTES
CHIEF COMPLAINT: Management of lymphoma    Problem List:  Oncology/Hematology History    1. History of benign transvaginal hysterectomy 1/1/2016  2. History of reflux  3. History of transient global amnesia  4. IV dye allergy as well as allergy to barium enema  5. 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  6/8/2016 colonoscopy benign  1/2019 apparently had CT screening of the chest that showed a couple of liver abnormalities (do not have those reports).  Unable to do with contrast and IV dye allergy.  Asymptomatic.  9/5/2019 CT of the abdomen without contrast shows at least 2 hepatic lesions, 4 cm in the right lobe of the liver and 2 cm in the left lobe of the liver.  9/11/2019 PET scan shows too numerous to count increased hypermetabolism in the anterior mediastinal, subcarinal, right lower lobe, head of the pancreas, esophagus, adjacent paraesophageal lymph nodes, liver, splenic hilum, left and right adrenal glands.  Also uptake in the mesentery, right retroperitoneum, right iliac chain, periumbilical subcutaneous fat, vaginal stump, and numerous bony lesions.  The bony lesions include the spine, right shoulder, right rib, sacrum, and right  "hip.  The 1.3 cm periumbilical subcutaneous nodule appears most amenable to biopsy.  Extensive periportal and periaortic lymphadenopathy with evidence of peritoneal metastasis for which PET CT was recommended.  9/18/2019 CT-guided fine-needle aspirate of nodule within the anterior abdominal wall \"atypical\" but unable to further typify.  9/26/2019 Dr. Kline initial visit.  Reviewed PET with patient and worrisome but nondiagnostic fine-needle aspirate.  Going to get sufficient tissue not only for diagnosis but for molecular testing that could include Biotheranostics to distinguish tissue type and 15 slides for Strata testing.  We will get our navigator on board as well.  She is having some nighttime sweats without fever though she does not describe them as bed drenching but this is new for her in the last month.  The range of possibilities here are broad and include lymphoma as well as a multiplicity of carcinomas but I need more than \"atypical\" cells on a fine-needle aspirate to ultimately decide our course.  We did talk about the likelihood that what ever we are up against it is unlikely to be curative but we will wait out her risk benefit ratio once we know the pathology.  Though they may ultimately not be helpful, while we are getting tissue I will go ahead and send off a barrage of tumor markers that might help guide us to the primary.    -10/7/2019 medical oncology office visit: Preliminary path report shows T-cell rich B-cell non-Hodgkin's lymphoma.  While awaiting final pathology we will get her port and echocardiogram and plan on treating her with Rituxan CHOP with Neulasta device for her stage IV high-grade lymphoma.  She has a high risk IPI score.  We will get her LDH and uric acid today.  Risks including renal dysfunction, nausea, neuropathy, pancytopenia, cardiac dysfunction, infusion reactions, etc. were discussed in detail in addition to additional side effects as standard per all these medicines.  She " knows that if this is the T-cell rich B-cell lymphoma that we treat this like a diffuse large B-cell lymphoma and that there is a possibility but not a guarantee of cure and it is not the majority in this setting but that plan and the statistics may change if the final pathology changes.    -10/8/2019 hepatitis B panel: Negative hepatitis B core antibody, negative hepatitis B surface antigen, reactive hepatitis B surface antibody.  Will need to monitor for any elevation of liver function studies during therapy.    -10/15/2019: Communication from Dr. Damian from [place] is that the final decision is that this is T-cell rich/histiocyte rich diffuse large B cell lymphoma.  Plan to proceed with R-CHOP with cooling cap.     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  Ejection fraction 58%.  Has cooling cap [employer].  There is a international shortage of vincristine.  She will get vincristine with R-CHOP course #1 today but I am hesitant to just drop vincristine from all of her subsequent regimens but we will not have any to give her.  I plan to switch her regimen to EPOCHR but have removed the vincristine from the plan.  This will make her cooling cap a moot point since this is an 4-day infusional chemotherapy.  I will admit her on November 11 through hospitalists and plan for Neulasta shot as an outpatient on November 16.        Liver metastases (CMS/HCC)    9/26/2019 Initial Diagnosis     Liver metastases (CMS/HCC)           Diffuse large B-cell lymphoma of lymph nodes of multiple regions (CMS/HCC)    9/26/2019 Initial Diagnosis     Diffuse large B-cell lymphoma of lymph nodes of multiple regions (CMS/HCC)            HISTORY OF PRESENT ILLNESS:  The patient is a 69 y.o. female, here for follow up on management of diffuse large B cell lymphoma  "T-cell rich/histiocyte rich B-cell.      Past Medical History:   Diagnosis Date   • Abnormal Pap smear of vagina     \"Abnormal pap smear\"   • Anxiety    • Cervical dysplasia    • Cystocele    • GERD (gastroesophageal reflux disease)     INTERMITTENT- NOT MEDICATION   • Migraine headache    • Osteopenia    • Transient global amnesia     fall 2015     Past Surgical History:   Procedure Laterality Date   • BREAST CYST ASPIRATION     • BREAST SURGERY      biopsy x 3   • CERVICAL BIOPSY      PT UNAWARE OF CERVIX BIOPSY   • IMPACTED THIRD MOLAR REMOVAL      wisdom teeth extracted in which two partially impacted   • OTHER SURGICAL HISTORY  10/02/2019    surgical bx of abd wall tumor   • TONSILLECTOMY     • VAGINAL HYSTERECTOMY      with anterior repair   • VAGINAL HYSTERECTOMY W/ ANTERIOR AND POSTERIOR VAGINAL REPAIR N/A 11/15/2016    Procedure: VAGINAL HYSTERECTOMY WITH ANTERIOR VAGINAL REPAIR;  Surgeon: Henry Pitt MD;  Location: [facility] OR;  Service:        Allergies   Allergen Reactions   • Betadine [Povidone Iodine]    • Barium-Containing Compounds Unknown (See Comments)     unknown   • Codeine    • Contrast Dye Unknown (See Comments)     Unknown   • E-Mycin [Erythromycin]    • Epinephrine    • Iodine    • Other      MSG, Rema, Preservatives in eye ointment   • Procaine Other (See Comments)     DENTAL ANASTHESIA CAUSED HEART RACING- YRS AGO\"   • Sudafed [Pseudoephedrine Hcl]        Family History and Social History reviewed and changed as necessary      REVIEW OF SYSTEM:   Review of Systems   Constitutional: Negative for appetite change, chills, diaphoresis, fatigue, fever and unexpected weight change.   HENT:   Negative for mouth sores, sore throat and trouble swallowing.    Eyes: Negative for icterus.   Respiratory: Negative for cough, hemoptysis and shortness of breath.    Cardiovascular: Negative for chest pain, leg swelling and palpitations.   Gastrointestinal: Negative for abdominal distention, abdominal " "pain, blood in stool, constipation, diarrhea, nausea and vomiting.   Endocrine: Negative for hot flashes.   Genitourinary: Negative for bladder incontinence, difficulty urinating, dysuria, frequency and hematuria.    Musculoskeletal: Negative for gait problem, neck pain and neck stiffness.   Skin: Negative for rash.   Neurological: Negative for dizziness, gait problem, headaches, light-headedness and numbness.   Hematological: Negative for adenopathy. Does not bruise/bleed easily.   Psychiatric/Behavioral: Negative for depression. The patient is not nervous/anxious.    All other systems reviewed and are negative.       PHYSICAL EXAM    Vitals:    10/21/19 0807   BP: 130/66   Pulse: 90   Resp: 16   Temp: 98.7 °F (37.1 °C)   SpO2: 94%   Weight: 67.6 kg (149 lb)   Height: 157.5 cm (62\")     Constitutional: Appears well-developed and well-nourished. No distress.   ECOG: (0) Fully active, able to carry on all predisease performance without restriction  HENT:   Head: Normocephalic.   Mouth/Throat: Oropharynx is clear and moist.   Eyes: Conjunctivae are normal. Pupils are equal, round, and reactive to light. No scleral icterus.   Neck: Neck supple. No JVD present. No thyromegaly present.   Cardiovascular: Normal rate, regular rhythm and normal heart sounds.    Pulmonary/Chest: Breath sounds normal. No respiratory distress.   Abdominal: Soft. Exhibits no distension and no mass. There is no hepatosplenomegaly. There is no tenderness. There is no rebound and no guarding.   Musculoskeletal:Exhibits no edema, tenderness or deformity.   Neurological: Alert and oriented to person, place, and time. Exhibits normal muscle tone.   Skin: No ecchymosis, no petechiae and no rash noted. Not diaphoretic. No cyanosis. Nails show no clubbing.   Psychiatric: Normal mood and affect.   Vitals reviewed.      Lab Results   Component Value Date    HGB 12.6 09/26/2019    HCT 37.5 09/26/2019    MCV 96.1 09/26/2019     09/26/2019    WBC 5.30 " 09/26/2019    NEUTROABS 3.50 09/26/2019    LYMPHSABS 1.50 09/26/2019    MONOSABS 0.30 09/26/2019    EOSABS 0.03 (L) 11/16/2016    BASOSABS 0.02 11/16/2016       Lab Results   Component Value Date    GLUCOSE 86 09/26/2019    BUN 28 (H) 09/26/2019    CREATININE 0.76 09/26/2019     09/26/2019    K 3.8 09/26/2019     09/26/2019    CO2 29.0 09/26/2019    CALCIUM 10.8 (H) 09/26/2019    PROTEINTOT 6.5 09/26/2019    ALBUMIN 4.40 09/26/2019    BILITOT 0.5 09/26/2019    ALKPHOS 68 09/26/2019    AST 30 09/26/2019    ALT 18 09/26/2019                   ASSESSMENT & PLAN:    1. History of benign transvaginal hysterectomy 1/1/2016  2. History of reflux  3. History of transient global amnesia  4. IV dye allergy as well as allergy to barium enema  5. 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  6/8/2016 colonoscopy benign  1/2019 apparently had CT screening of the chest that showed a couple of liver abnormalities (do not have those reports).  Unable to do with contrast and IV dye allergy.  Asymptomatic.  9/5/2019 CT of the abdomen without contrast shows at least 2 hepatic lesions, 4 cm in the right lobe of the liver and 2 cm in the left lobe of the  "liver.  9/11/2019 PET scan shows too numerous to count increased hypermetabolism in the anterior mediastinal, subcarinal, right lower lobe, head of the pancreas, esophagus, adjacent paraesophageal lymph nodes, liver, splenic hilum, left and right adrenal glands.  Also uptake in the mesentery, right retroperitoneum, right iliac chain, periumbilical subcutaneous fat, vaginal stump, and numerous bony lesions.  The bony lesions include the spine, right shoulder, right rib, sacrum, and right hip.  The 1.3 cm periumbilical subcutaneous nodule appears most amenable to biopsy.  Extensive periportal and periaortic lymphadenopathy with evidence of peritoneal metastasis for which PET CT was recommended.  9/18/2019 CT-guided fine-needle aspirate of nodule within the anterior abdominal wall \"atypical\" but unable to further typify.  9/26/2019 Dr. Kline initial visit.  Reviewed PET with patient and worrisome but nondiagnostic fine-needle aspirate.  Going to get sufficient tissue not only for diagnosis but for molecular testing that could include Biotheranostics to distinguish tissue type and 15 slides for Strata testing.  We will get our navigator on board as well.  She is having some nighttime sweats without fever though she does not describe them as bed drenching but this is new for her in the last month.  The range of possibilities here are broad and include lymphoma as well as a multiplicity of carcinomas but I need more than \"atypical\" cells on a fine-needle aspirate to ultimately decide our course.  We did talk about the likelihood that what ever we are up against it is unlikely to be curative but we will wait out her risk benefit ratio once we know the pathology.  Though they may ultimately not be helpful, while we are getting tissue I will go ahead and send off a barrage of tumor markers that might help guide us to the primary.    -10/7/2019 medical oncology office visit: Preliminary path report shows T-cell rich B-cell " non-Hodgkin's lymphoma.  While awaiting final pathology we will get her port and echocardiogram and plan on treating her with Rituxan CHOP with Neulasta device for her stage IV high-grade lymphoma.  She has a high risk IPI score.  We will get her LDH and uric acid today.  Risks including renal dysfunction, nausea, neuropathy, pancytopenia, cardiac dysfunction, infusion reactions, etc. were discussed in detail in addition to additional side effects as standard per all these medicines.  She knows that if this is the T-cell rich B-cell lymphoma that we treat this like a diffuse large B-cell lymphoma and that there is a possibility but not a guarantee of cure and it is not the majority in this setting but that plan and the statistics may change if the final pathology changes.    -10/8/2019 hepatitis B panel: Negative hepatitis B core antibody, negative hepatitis B surface antigen, reactive hepatitis B surface antibody.  Will need to monitor for any elevation of liver function studies during therapy.    -10/15/2019: Communication from Dr. Damian from [place] is that the final decision is that this is T-cell rich/histiocyte rich diffuse large B cell lymphoma.  Plan to proceed with R-CHOP with cooling cap.     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  Ejection fraction 58%.  Has cooling cap [employer].  There is a international shortage of vincristine.  She will get vincristine with R-CHOP course #1 today but I am hesitant to just drop vincristine from all of her subsequent regimens but we will not have any to give her.  I plan to switch her regimen to EPOCHR but have removed the vincristine from the plan.  This will make her cooling cap a moot point since this is an 4-day infusional chemotherapy.  I will admit her on November 11 through hospitalists  and plan for Neulasta shot as an outpatient on November 16.  I discussed this complex decision tree and the alterations in our plan based on the shortage of vincristine with the patient for over 45 minutes face-to-face greater than 50% spent counseling regarding this very complex situation    Ranulfo Kline MD    10/21/2019

## 2019-10-21 NOTE — PROGRESS NOTES
I saw patient in the waiting area of Medical Oncology. Patient was waiting to see Dr. Kline. Patient is here with her middle daughter. Patient showed me that she did receive her cooling cap. Patient had her saran wrap over her port.  I listened while patient talked until she was called in to see Dr. Kline.

## 2019-10-25 ENCOUNTER — DOCUMENTATION (OUTPATIENT)
Dept: ONCOLOGY | Facility: CLINIC | Age: 69
End: 2019-10-25

## 2019-10-25 NOTE — PROGRESS NOTES
Oncology Nutrition Screening    Patient Name:  Nell Montez  YOB: 1950  MRN: [id number]  Date:  10/25/19  Physician:  Dr. Kline    Type of Cancer Treatment:   Chemotherapy: R-CHOP - every 21 day cycle    Patient Active Problem List   Diagnosis   • Cystocele or rectocele with uterine prolapse   • Cystocele   • Prolapse of female pelvic organs   • Vaginal atrophy   • Liver metastases (CMS/HCC)   • Diffuse large B-cell lymphoma of lymph nodes of multiple regions (CMS/HCC)   • Encounter for antineoplastic chemotherapy   • Adverse reaction to antineoplastic drug, sequela   • Encounter for adjustment or management of vascular access device       Current Outpatient Medications   Medication Sig Dispense Refill   • allopurinol (ZYLOPRIM) 300 MG tablet Take 1 tablet by mouth Daily. 30 tablet 0   • aspirin 81 MG EC tablet Take 81 mg by mouth Daily. LAST DOSE 11TH     • Cholecalciferol (VITAMIN D) 1000 UNITS tablet Take 1,000 Units by mouth Daily. Takes 2 daily     • Docusate Sodium (DSS) 100 MG capsule Take 100 mg by mouth 2 (Two) Times a Day. (Patient taking differently: Take 100 mg by mouth 2 (Two) Times a Day As Needed.) 60 each 0   • estradiol (ESTRACE VAGINAL) 0.1 MG/GM vaginal cream Insert 2 g into the vagina Daily. 42.5 g 12   • lidocaine-prilocaine (EMLA) 2.5-2.5 % cream Apply  topically to the appropriate area as directed As Needed (45-60 minutes prior to port access.  Cover with saran/plastic wrap.). 30 g 3   • loratadine (CLARITIN) 10 MG tablet Take 10 mg by mouth daily.     • Multiple Vitamins-Minerals (MULTIVITAMIN ADULT PO) Take  by mouth Daily.     • Omega-3 Fatty Acids (FISH OIL) 1000 MG capsule capsule Take  by mouth Daily With Breakfast.     • ondansetron (ZOFRAN) 8 MG tablet Take 1 tablet by mouth 3 (Three) Times a Day As Needed for Nausea or Vomiting. 30 tablet 5   • ramipril (ALTACE) 2.5 MG capsule Take 2.5 mg by mouth Daily.       No current facility-administered medications for this  visit.        Glycemic Risk:   [place]    Weight:   Height: 62 inches  Weight: 149 lbs.  Usual Body Weight: ~174 lbs.   BMI: 27.2  Overweight  Weight has decreased ~25 pounds over last ~4 months; 14.4% weight loss    Oral Food Intake:  Regular Diet - No Restrictions  Compared to normal intake, current food intake is less than normal    Hydration Status:   How many 8 ounce glass of water of fluid do you drink per day?  Patient reports drinking mostly water during the day.    Enteral Feeding:   n/a    Nutrition Symptoms:   Altered Apetite    Activity:   Not assessed at this time.     reports that she quit smoking about 49 years ago. She has a 2.00 pack-year smoking history. She has never used smokeless tobacco. She reports that she does not drink alcohol or use drugs.    Evaluation of Nutritional Risk:   Patient has been identified at nutritional risk due to diagnosis, treatment plan, and recent weight loss.  Met with patient and her daughter during her initial chemotherapy infusion appointment.  Patient states her appetite / oral intake has been decreased which has resulted in weight loss as above.  She reports she is drinking ~2 Boost or Ensure daily to aid with calorie / protein intake.    Discussed the importance of good nutrition during her treatment course focusing on adequate calorie, protein, nutrient and fluid intake.  Advised her to be consuming smaller more frequent meals/snacks throughout the day to aid with potential nausea management.  Emphasized the importance of protein and its role in the diet; reviewed high protein foods; and recommended she have a protein source at each meal/snack.  Also emphasized the importance of hydration; reviewed good hydrating fluid options; and recommended she drink at least 64 ounces daily.  Discussed nutritional supplements and their role in the diet and suggested she be drinking Boost Plus and/or Ensure Enlive as those are more calorically and protein dense.  Provided  "samples and coupons of Boost Plus and Ensure Enlive.  Offered several snack ideas she may find more appealing at this time and provided her a cookbook \"What To Eat During Cancer Treatment\".    Answered their questions and both voiced understanding of information discussed.  RD's contact information provided and encouraged to call with questions.  Will follow up as indicated.  RD available to assist prn.    Electronically signed by:  Isaura Macario RD  8:26 AM  "

## 2019-11-11 ENCOUNTER — HOSPITAL ENCOUNTER (INPATIENT)
Facility: HOSPITAL | Age: 69
LOS: 4 days | Discharge: HOME | End: 2019-11-15
Attending: INTERNAL MEDICINE | Admitting: FAMILY MEDICINE

## 2019-11-11 ENCOUNTER — OFFICE VISIT (OUTPATIENT)
Dept: ONCOLOGY | Facility: CLINIC | Age: 69
End: 2019-11-11

## 2019-11-11 VITALS
BODY MASS INDEX: 27.23 KG/M2 | WEIGHT: 148 LBS | TEMPERATURE: 97.7 F | SYSTOLIC BLOOD PRESSURE: 128 MMHG | RESPIRATION RATE: 16 BRPM | HEIGHT: 62 IN | HEART RATE: 92 BPM | OXYGEN SATURATION: 96 % | DIASTOLIC BLOOD PRESSURE: 62 MMHG

## 2019-11-11 DIAGNOSIS — C83.38 DIFFUSE LARGE B-CELL LYMPHOMA OF LYMPH NODES OF MULTIPLE REGIONS (HCC): ICD-10-CM

## 2019-11-11 DIAGNOSIS — C78.7 LIVER METASTASES: Primary | Chronic | ICD-10-CM

## 2019-11-11 DIAGNOSIS — C83.38 DIFFUSE LARGE B-CELL LYMPHOMA OF LYMPH NODES OF MULTIPLE REGIONS (HCC): Primary | ICD-10-CM

## 2019-11-11 DIAGNOSIS — C83.38 DIFFUSE LARGE B-CELL LYMPHOMA OF LYMPH NODES OF MULTIPLE REGIONS (HCC): Chronic | ICD-10-CM

## 2019-11-11 LAB
ALBUMIN SERPL-MCNC: 3.7 G/DL (ref 3.5–5.2)
ALBUMIN/GLOB SERPL: 1.6 G/DL
ALP SERPL-CCNC: 77 U/L (ref 39–117)
ALT SERPL W P-5'-P-CCNC: 11 U/L (ref 1–33)
ANION GAP SERPL CALCULATED.3IONS-SCNC: 9 MMOL/L (ref 5–15)
AST SERPL-CCNC: 17 U/L (ref 1–32)
BASOPHILS # BLD AUTO: 0.07 10*3/MM3 (ref 0–0.2)
BASOPHILS NFR BLD AUTO: 1.4 % (ref 0–1.5)
BILIRUB SERPL-MCNC: 0.3 MG/DL (ref 0.2–1.2)
BUN BLD-MCNC: 20 MG/DL (ref 8–23)
BUN/CREAT SERPL: 37 (ref 7–25)
CALCIUM SPEC-SCNC: 9 MG/DL (ref 8.6–10.5)
CHLORIDE SERPL-SCNC: 105 MMOL/L (ref 98–107)
CO2 SERPL-SCNC: 25 MMOL/L (ref 22–29)
CREAT BLD-MCNC: 0.54 MG/DL (ref 0.57–1)
DEPRECATED RDW RBC AUTO: 45.9 FL (ref 37–54)
EOSINOPHIL # BLD AUTO: 0.02 10*3/MM3 (ref 0–0.4)
EOSINOPHIL NFR BLD AUTO: 0.4 % (ref 0.3–6.2)
ERYTHROCYTE [DISTWIDTH] IN BLOOD BY AUTOMATED COUNT: 13.8 % (ref 12.3–15.4)
GFR SERPL CREATININE-BSD FRML MDRD: 111 ML/MIN/1.73
GLOBULIN UR ELPH-MCNC: 2.3 GM/DL
GLUCOSE BLD-MCNC: 108 MG/DL (ref 65–99)
HCT VFR BLD AUTO: 31.5 % (ref 34–46.6)
HGB BLD-MCNC: 10.2 G/DL (ref 12–15.9)
IMM GRANULOCYTES # BLD AUTO: 0.03 10*3/MM3 (ref 0–0.05)
IMM GRANULOCYTES NFR BLD AUTO: 0.6 % (ref 0–0.5)
LDH SERPL-CCNC: 170 U/L (ref 135–214)
LYMPHOCYTES # BLD AUTO: 1.28 10*3/MM3 (ref 0.7–3.1)
LYMPHOCYTES NFR BLD AUTO: 25.1 % (ref 19.6–45.3)
MAGNESIUM SERPL-MCNC: 2 MG/DL (ref 1.6–2.4)
MCH RBC QN AUTO: 30.6 PG (ref 26.6–33)
MCHC RBC AUTO-ENTMCNC: 32.4 G/DL (ref 31.5–35.7)
MCV RBC AUTO: 94.6 FL (ref 79–97)
MONOCYTES # BLD AUTO: 0.52 10*3/MM3 (ref 0.1–0.9)
MONOCYTES NFR BLD AUTO: 10.2 % (ref 5–12)
NEUTROPHILS # BLD AUTO: 3.18 10*3/MM3 (ref 1.7–7)
NEUTROPHILS NFR BLD AUTO: 62.3 % (ref 42.7–76)
NRBC BLD AUTO-RTO: 0 /100 WBC (ref 0–0.2)
PHOSPHATE SERPL-MCNC: 3.1 MG/DL (ref 2.5–4.5)
PLATELET # BLD AUTO: 309 10*3/MM3 (ref 140–450)
PMV BLD AUTO: 9.4 FL (ref 6–12)
POTASSIUM BLD-SCNC: 3.8 MMOL/L (ref 3.5–5.2)
PROT SERPL-MCNC: 6 G/DL (ref 6–8.5)
RBC # BLD AUTO: 3.33 10*6/MM3 (ref 3.77–5.28)
SODIUM BLD-SCNC: 139 MMOL/L (ref 136–145)
TSH SERPL DL<=0.05 MIU/L-ACNC: 1.98 UIU/ML (ref 0.27–4.2)
URATE SERPL-MCNC: 2.6 MG/DL (ref 2.4–5.7)
WBC NRBC COR # BLD: 5.1 10*3/MM3 (ref 3.4–10.8)

## 2019-11-11 PROCEDURE — 25010000002 RITUXIMAB 10 MG/ML SOLUTION 50 ML VIAL: Performed by: INTERNAL MEDICINE

## 2019-11-11 PROCEDURE — 25010000002 ONDANSETRON PER 1 MG: Performed by: INTERNAL MEDICINE

## 2019-11-11 PROCEDURE — 84443 ASSAY THYROID STIM HORMONE: CPT | Performed by: HOSPITALIST

## 2019-11-11 PROCEDURE — 25010000002 ETOPOSIDE 100 MG/5ML SOLUTION 5 ML VIAL: Performed by: INTERNAL MEDICINE

## 2019-11-11 PROCEDURE — 84550 ASSAY OF BLOOD/URIC ACID: CPT | Performed by: INTERNAL MEDICINE

## 2019-11-11 PROCEDURE — 83735 ASSAY OF MAGNESIUM: CPT | Performed by: INTERNAL MEDICINE

## 2019-11-11 PROCEDURE — 3E04305 INTRODUCTION OF OTHER ANTINEOPLASTIC INTO CENTRAL VEIN, PERCUTANEOUS APPROACH: ICD-10-PCS | Performed by: INTERNAL MEDICINE

## 2019-11-11 PROCEDURE — 85025 COMPLETE CBC W/AUTO DIFF WBC: CPT | Performed by: INTERNAL MEDICINE

## 2019-11-11 PROCEDURE — 25010000002 DIPHENHYDRAMINE PER 50 MG: Performed by: INTERNAL MEDICINE

## 2019-11-11 PROCEDURE — 80053 COMPREHEN METABOLIC PANEL: CPT | Performed by: INTERNAL MEDICINE

## 2019-11-11 PROCEDURE — 84100 ASSAY OF PHOSPHORUS: CPT | Performed by: INTERNAL MEDICINE

## 2019-11-11 PROCEDURE — 99223 1ST HOSP IP/OBS HIGH 75: CPT | Performed by: HOSPITALIST

## 2019-11-11 PROCEDURE — 25010000002 ENOXAPARIN PER 10 MG: Performed by: HOSPITALIST

## 2019-11-11 PROCEDURE — 83615 LACTATE (LD) (LDH) ENZYME: CPT | Performed by: INTERNAL MEDICINE

## 2019-11-11 PROCEDURE — 63710000001 PREDNISONE PER 5 MG: Performed by: HOSPITALIST

## 2019-11-11 PROCEDURE — 25010000002 RITUXIMAB 10 MG/ML SOLUTION 10 ML VIAL: Performed by: INTERNAL MEDICINE

## 2019-11-11 PROCEDURE — 99213 OFFICE O/P EST LOW 20 MIN: CPT | Performed by: INTERNAL MEDICINE

## 2019-11-11 PROCEDURE — 25010000002 DOXORUBICIN PER 10 MG: Performed by: INTERNAL MEDICINE

## 2019-11-11 RX ORDER — ACETAMINOPHEN 325 MG/1
650 TABLET ORAL ONCE
Status: COMPLETED | OUTPATIENT
Start: 2019-11-11 | End: 2019-11-11

## 2019-11-11 RX ORDER — PANTOPRAZOLE SODIUM 40 MG/1
40 TABLET, DELAYED RELEASE ORAL
Status: CANCELLED | OUTPATIENT
Start: 2019-11-11

## 2019-11-11 RX ORDER — DOCUSATE SODIUM 100 MG/1
100 CAPSULE, LIQUID FILLED ORAL 2 TIMES DAILY
Status: DISCONTINUED | OUTPATIENT
Start: 2019-11-12 | End: 2019-11-15 | Stop reason: HOSPADM

## 2019-11-11 RX ORDER — ACETAMINOPHEN 325 MG/1
650 TABLET ORAL ONCE
Status: CANCELLED | OUTPATIENT
Start: 2019-11-11

## 2019-11-11 RX ORDER — FAMOTIDINE 10 MG/ML
20 INJECTION, SOLUTION INTRAVENOUS AS NEEDED
Status: CANCELLED | OUTPATIENT
Start: 2019-11-11

## 2019-11-11 RX ORDER — SODIUM CHLORIDE 9 MG/ML
75 INJECTION, SOLUTION INTRAVENOUS CONTINUOUS
Status: CANCELLED | OUTPATIENT
Start: 2019-11-11

## 2019-11-11 RX ORDER — DIPHENHYDRAMINE HYDROCHLORIDE 50 MG/ML
50 INJECTION INTRAMUSCULAR; INTRAVENOUS AS NEEDED
Status: DISCONTINUED | OUTPATIENT
Start: 2019-11-11 | End: 2019-11-15 | Stop reason: HOSPADM

## 2019-11-11 RX ORDER — FAMOTIDINE 10 MG/ML
20 INJECTION, SOLUTION INTRAVENOUS AS NEEDED
Status: DISCONTINUED | OUTPATIENT
Start: 2019-11-11 | End: 2019-11-15 | Stop reason: HOSPADM

## 2019-11-11 RX ORDER — PREDNISONE 50 MG/1
100 TABLET ORAL
Status: DISCONTINUED | OUTPATIENT
Start: 2019-11-12 | End: 2019-11-15 | Stop reason: HOSPADM

## 2019-11-11 RX ORDER — SODIUM CHLORIDE 0.9 % (FLUSH) 0.9 %
10 SYRINGE (ML) INJECTION AS NEEDED
Status: DISCONTINUED | OUTPATIENT
Start: 2019-11-11 | End: 2019-11-15 | Stop reason: HOSPADM

## 2019-11-11 RX ORDER — ONDANSETRON 2 MG/ML
4 INJECTION INTRAMUSCULAR; INTRAVENOUS EVERY 6 HOURS PRN
Status: CANCELLED | OUTPATIENT
Start: 2019-11-11

## 2019-11-11 RX ORDER — SODIUM CHLORIDE 0.9 % (FLUSH) 0.9 %
10 SYRINGE (ML) INJECTION EVERY 12 HOURS SCHEDULED
Status: DISCONTINUED | OUTPATIENT
Start: 2019-11-11 | End: 2019-11-15 | Stop reason: HOSPADM

## 2019-11-11 RX ORDER — DIPHENHYDRAMINE HYDROCHLORIDE 50 MG/ML
50 INJECTION INTRAMUSCULAR; INTRAVENOUS AS NEEDED
Status: CANCELLED | OUTPATIENT
Start: 2019-11-11

## 2019-11-11 RX ORDER — PREDNISONE 50 MG/1
100 TABLET ORAL
Status: DISCONTINUED | OUTPATIENT
Start: 2019-11-11 | End: 2019-11-11

## 2019-11-11 RX ORDER — PREDNISONE 50 MG/1
100 TABLET ORAL DAILY
Status: DISCONTINUED | OUTPATIENT
Start: 2019-11-11 | End: 2019-11-11 | Stop reason: SDUPTHER

## 2019-11-11 RX ORDER — CETIRIZINE HYDROCHLORIDE 10 MG/1
10 TABLET ORAL NIGHTLY
Status: DISCONTINUED | OUTPATIENT
Start: 2019-11-12 | End: 2019-11-15 | Stop reason: HOSPADM

## 2019-11-11 RX ORDER — LORAZEPAM 2 MG/ML
0.5 INJECTION INTRAMUSCULAR EVERY 12 HOURS PRN
Status: DISCONTINUED | OUTPATIENT
Start: 2019-11-11 | End: 2019-11-15 | Stop reason: HOSPADM

## 2019-11-11 RX ORDER — SODIUM CHLORIDE 9 MG/ML
75 INJECTION, SOLUTION INTRAVENOUS CONTINUOUS
Status: DISCONTINUED | OUTPATIENT
Start: 2019-11-11 | End: 2019-11-15 | Stop reason: HOSPADM

## 2019-11-11 RX ORDER — MEPERIDINE HYDROCHLORIDE 50 MG/ML
25 INJECTION INTRAMUSCULAR; INTRAVENOUS; SUBCUTANEOUS
Status: CANCELLED | OUTPATIENT
Start: 2019-11-11 | End: 2019-11-12

## 2019-11-11 RX ORDER — ALLOPURINOL 300 MG/1
300 TABLET ORAL DAILY
Status: DISCONTINUED | OUTPATIENT
Start: 2019-11-11 | End: 2019-11-15 | Stop reason: HOSPADM

## 2019-11-11 RX ORDER — PANTOPRAZOLE SODIUM 40 MG/1
40 TABLET, DELAYED RELEASE ORAL
Status: DISCONTINUED | OUTPATIENT
Start: 2019-11-11 | End: 2019-11-11 | Stop reason: SDUPTHER

## 2019-11-11 RX ORDER — PANTOPRAZOLE SODIUM 40 MG/1
40 TABLET, DELAYED RELEASE ORAL
Status: COMPLETED | OUTPATIENT
Start: 2019-11-12 | End: 2019-11-15

## 2019-11-11 RX ORDER — PROCHLORPERAZINE MALEATE 5 MG/1
10 TABLET ORAL EVERY 6 HOURS PRN
Status: DISCONTINUED | OUTPATIENT
Start: 2019-11-11 | End: 2019-11-15 | Stop reason: HOSPADM

## 2019-11-11 RX ORDER — MEPERIDINE HYDROCHLORIDE 50 MG/ML
25 INJECTION INTRAMUSCULAR; INTRAVENOUS; SUBCUTANEOUS
Status: ACTIVE | OUTPATIENT
Start: 2019-11-11 | End: 2019-11-12

## 2019-11-11 RX ORDER — PREDNISONE 1 MG/1
100 TABLET ORAL DAILY
Status: CANCELLED | OUTPATIENT
Start: 2019-11-11

## 2019-11-11 RX ORDER — PANTOPRAZOLE SODIUM 40 MG/1
40 TABLET, DELAYED RELEASE ORAL
Status: DISCONTINUED | OUTPATIENT
Start: 2019-11-11 | End: 2019-11-11

## 2019-11-11 RX ORDER — PROCHLORPERAZINE MALEATE 10 MG
10 TABLET ORAL EVERY 6 HOURS PRN
Status: CANCELLED | OUTPATIENT
Start: 2019-11-11

## 2019-11-11 RX ORDER — ONDANSETRON 2 MG/ML
4 INJECTION INTRAMUSCULAR; INTRAVENOUS EVERY 6 HOURS PRN
Status: DISCONTINUED | OUTPATIENT
Start: 2019-11-11 | End: 2019-11-15 | Stop reason: HOSPADM

## 2019-11-11 RX ADMIN — ALLOPURINOL 300 MG: 300 TABLET ORAL at 12:36

## 2019-11-11 RX ADMIN — SODIUM CHLORIDE 75 ML/HR: 9 INJECTION, SOLUTION INTRAVENOUS at 13:29

## 2019-11-11 RX ADMIN — ACETAMINOPHEN 650 MG: 325 TABLET ORAL at 13:38

## 2019-11-11 RX ADMIN — PANTOPRAZOLE SODIUM 40 MG: 40 TABLET, DELAYED RELEASE ORAL at 12:37

## 2019-11-11 RX ADMIN — RITUXIMAB 600 MG: 10 INJECTION, SOLUTION INTRAVENOUS at 13:58

## 2019-11-11 RX ADMIN — CETIRIZINE HYDROCHLORIDE 10 MG: 10 TABLET, FILM COATED ORAL at 23:25

## 2019-11-11 RX ADMIN — DOCUSATE SODIUM 100 MG: 100 CAPSULE, LIQUID FILLED ORAL at 23:25

## 2019-11-11 RX ADMIN — ENOXAPARIN SODIUM 40 MG: 40 INJECTION SUBCUTANEOUS at 12:36

## 2019-11-11 RX ADMIN — DIPHENHYDRAMINE HYDROCHLORIDE 50 MG: 50 INJECTION INTRAMUSCULAR; INTRAVENOUS at 13:33

## 2019-11-11 RX ADMIN — PREDNISONE 100 MG: 50 TABLET ORAL at 13:26

## 2019-11-11 RX ADMIN — ONDANSETRON 16 MG: 2 INJECTION INTRAMUSCULAR; INTRAVENOUS at 18:05

## 2019-11-11 RX ADMIN — ETOPOSIDE: 20 INJECTION, SOLUTION INTRAVENOUS at 19:25

## 2019-11-11 NOTE — H&P
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  She will receive inpatient chemotherapy with monitoring for side effects.      It appears her next course will be in early Dec 2019.      Review of Systems     Gen- No fevers, chills  CV- No chest pain, palpitations  Resp- No cough, dyspnea  GI- No N/V/D, abd pain    All other systems reviewed and are negative.     Personal History     Past Medical History:   Diagnosis Date   • Abnormal Pap smear of vagina     \"Abnormal pap smear\"   • Anxiety    • Cervical dysplasia    • Cystocele    • GERD (gastroesophageal reflux disease)     INTERMITTENT- NOT MEDICATION   • Migraine headache    • Osteopenia    • Transient global amnesia     2015       Past Surgical History:   Procedure Laterality Date   • BREAST CYST ASPIRATION     • BREAST SURGERY      biopsy x 3   • CERVICAL BIOPSY      PT UNAWARE OF CERVIX BIOPSY   • IMPACTED THIRD MOLAR REMOVAL      wisdom teeth extracted in which two partially impacted   • OTHER SURGICAL HISTORY  10/02/2019    surgical bx of abd wall tumor   • TONSILLECTOMY     • VAGINAL HYSTERECTOMY      with anterior repair   • VAGINAL HYSTERECTOMY W/ ANTERIOR AND POSTERIOR VAGINAL REPAIR N/A 11/15/2016    Procedure: VAGINAL HYSTERECTOMY WITH ANTERIOR VAGINAL REPAIR;  Surgeon: Henry Pitt MD;  Location: [facility];  Service:        Family History: family history includes Brain cancer in her cousin; Other in her daughter, father, and mother; " Prostate cancer in her maternal grandfather; Rheum arthritis in her daughter; Thyroid disease in her daughter; Tuberculosis in her maternal grandmother. Otherwise pertinent FHx was reviewed and unremarkable.   Father - autoimmune disease; polymyalgia  Mother - Stroke    Social History:  reports that she quit smoking about 49 years ago. She has a 2.00 pack-year smoking history. She has never used smokeless tobacco. She reports that she does not drink alcohol or use drugs.  Social History     Social History Narrative   • Not on file       5 kids  Bachelor of Science  Nursing  Former smoker  Denies alcohol  Denies drug use    Medications:    Available home medication information reviewed.  Medications Prior to Admission   Medication Sig Dispense Refill Last Dose   • allopurinol (ZYLOPRIM) 300 MG tablet Take 1 tablet by mouth Daily. 30 tablet 0 11/10/2019 at 2100   • Cholecalciferol (VITAMIN D) 1000 UNITS tablet Take 1,000 Units by mouth Daily. Takes 2 daily   11/10/2019 at Unknown time   • Docusate Sodium (DSS) 100 MG capsule Take 100 mg by mouth 2 (Two) Times a Day. (Patient taking differently: Take 100 mg by mouth 2 (Two) Times a Day As Needed.) 60 each 0 11/10/2019 at 2100   • estradiol (ESTRACE VAGINAL) 0.1 MG/GM vaginal cream Insert 2 g into the vagina Daily. 42.5 g 12 11/10/2019 at 2100   • lidocaine-prilocaine (EMLA) 2.5-2.5 % cream Apply  topically to the appropriate area as directed As Needed (45-60 minutes prior to port access.  Cover with saran/plastic wrap.). (Patient taking differently: Apply 73,007 application topically to the appropriate area as directed As Needed (45-60 minutes prior to port access.  Cover with saran/plastic wrap.).) 30 g 3 11/11/2019 at 730   • loratadine (CLARITIN) 10 MG tablet Take 10 mg by mouth daily.   11/10/2019 at 2100   • Multiple Vitamins-Minerals (MULTIVITAMIN ADULT PO) Take  by mouth Daily.   11/10/2019 at 2100   • ondansetron (ZOFRAN) 8 MG tablet Take 1 tablet  "by mouth 3 (Three) Times a Day As Needed for Nausea or Vomiting. 30 tablet 5 Past Month at Unknown time   • aspirin 81 MG EC tablet Take 81 mg by mouth Daily. LAST DOSE 11TH   Taking   • Omega-3 Fatty Acids (FISH OIL) 1000 MG capsule capsule Take  by mouth Daily With Breakfast.   Taking   • ramipril (ALTACE) 2.5 MG capsule Take 2.5 mg by mouth Daily.   Taking       Allergies   Allergen Reactions   • Betadine [Povidone Iodine]    • Barium-Containing Compounds Unknown (See Comments)     unknown   • Codeine    • Contrast Dye Unknown (See Comments)     Unknown   • E-Mycin [Erythromycin]    • Epinephrine    • Iodine    • Other      MSG, Rema, Preservatives in eye ointment   • Procaine Other (See Comments)     DENTAL ANASTHESIA CAUSED HEART RACING- YRS AGO\"   • Sudafed [Pseudoephedrine Hcl]        Objective   Objective     Vital Signs:   Temp:  [97.7 °F (36.5 °C)-97.8 °F (36.6 °C)] 97.8 °F (36.6 °C)  Heart Rate:  [92-94] 94  Resp:  [16] 16  BP: (128-131)/(62-71) 131/71        Physical Exam     Constitutional: Awake, alert  Eyes: PERRLA, sclerae anicteric, no conjunctival injection  HENT: NCAT, mucous membranes moist  Neck: Supple, no JVD, trachea midline  Respiratory: Clear to auscultation bilaterally, nonlabored respirations   Cardiovascular: RRR, s1 and s2, no murmur  Gastrointestinal: Positive bowel sounds, soft, nontender, nondistended  Musculoskeletal: No bilateral ankle edema, no clubbing or cyanosis to extremities  Psychiatric: Appropriate affect, cooperative  Neurologic: Oriented x 3, strength symmetric in all extremities, Cranial Nerves grossly intact to confrontation, speech clear  Skin: dry, + skin tenting    Results Reviewed:  I have personally reviewed current lab and radiology data.              Invalid input(s):  ALKPHOS  Estimated Creatinine Clearance: 57.9 mL/min (by C-G formula based on SCr of 0.58 mg/dL).  Brief Urine Lab Results     None        Imaging Results (Last 24 Hours)     ** No results found " for the last 24 hours. **        Results for orders placed during the hospital encounter of 10/11/19   Adult Transthoracic Echo Complete W/ Cont if Necessary Per Protocol    Narrative · Left ventricular systolic function is normal.  · Calculated EF = 58.4%. Estimated EF appears to be in the range of 56 -   60%. Global Longitudinal LV strain = -16.7%.  · Left ventricular wall thickness is consistent with mild septal   asymmetric hypertrophy.  · Left ventricular diastolic dysfunction (grade I) consistent with   impaired relaxation.  · Right ventricular cavity is mild-to-moderately dilated.          Assessment/Plan   Assessment / Plan     Active Hospital Problems    Diagnosis POA   • Diffuse large B-cell lymphoma of lymph nodes of multiple regions (CMS/HCC) [C83.38] Yes     69 year old female directly admitted for management of lymphoma.    Diffuse Large B Cell Lymphoma  - s/p first course of R-CHOP in Oct 2019  - inpatient, infusional chemotherapy planned by Dr. Kline  - admit today  - start allopurinol 300 mg daily  - Prednisone 100 mg daily  - Dr. Kline has seen  GERD  - start Protonix as she will be on steroids  Constipation  - on dulcolax at home  Hyperuricemia, chemo related  - allopurinol 300 mg daily  Allergy Symptoms  - on Claritin at home    DVT prophylaxis:  Lovenox SC    CODE STATUS:    Code Status and Medical Interventions:   Ordered at: 11/11/19 1006     Level Of Support Discussed With:    Patient     Code Status:    CPR     Medical Interventions (Level of Support Prior to Arrest):    Full       Admission Status:  I believe this patient meets INPATIENT status due to infusional chemotherapy and side effects related to this.  I feel patient’s risk for adverse outcomes and need for care warrant INPATIENT evaluation and I predict the patient’s care encounter to likely last beyond 2 midnights.        Electronically signed by Navid Aguilar MD, 11/11/19, 10:09 AM.

## 2019-11-11 NOTE — PROGRESS NOTES
Discharge Planning Assessment  [facility]     Patient Name: Nell Montez  MRN: [id number]  Today's Date: 11/11/2019    Admit Date: 11/11/2019    Discharge Needs Assessment     Row Name 11/11/19 1605       Living Environment    Lives With  alone    Current Living Arrangements  home/apartment/condo    Primary Care Provided by  self    Provides Primary Care For  no one    Family Caregiver if Needed  child(alla), adult    Family Caregiver Names  HK(son) and Amee(daughter); Jenelle Ritter(friend)    Quality of Family Relationships  helpful;involved;supportive    Able to Return to Prior Arrangements  yes    Living Arrangement Comments  Spoke with pt and pt's friend, Jenelle in room with permission regarding discharge plan. Pt resides in [facility] in a house alone       Resource/Environmental Concerns    Resource/Environmental Concerns  none       Transition Planning    Patient/Family Anticipates Transition to  home with family    Patient/Family Anticipated Services at Transition      Transportation Anticipated  car, drives self;family or friend will provide       Discharge Needs Assessment    Readmission Within the Last 30 Days  no previous admission in last 30 days    Concerns to be Addressed  discharge planning    Equipment Currently Used at Home  none    Anticipated Changes Related to Illness  other (see comments) Pt here for chemo    Equipment Needed After Discharge  none    Discharge Coordination/Progress  Pt has Medicare and [employer] insurance. Pt reports she has prescription coverage through [place]. Pt uses [facility] at [facility].  Plan is home. Her friend Jenelle will help her and daughter is flying in on Friday to stay with her a few days.  Pt currently denies discharge needs. CM will cont to follow.        Discharge Plan     Row Name 11/11/19 1610       Plan    Plan  discharge plan    Patient/Family in Agreement with Plan  yes    Plan Comments  Plan is home and friend Jenelle will assist  her as needed.  Her daughter Amee will be flying in Friday to stay with her a few days.  Pt currently denies discharge needs. CM will cont to follow.    Final Discharge Disposition Code  01 - home or self-care        Destination      No service coordination in this encounter.      Durable Medical Equipment      No service coordination in this encounter.      Dialysis/Infusion      No service coordination in this encounter.      Home Medical Care      No service coordination in this encounter.      Therapy      No service coordination in this encounter.      Community Resources      No service coordination in this encounter.          Demographic Summary     Row Name 11/11/19 1600       General Information    General Information Comments  Pt's PCP is Rehan Fernandez       Contact Information    Permission Granted to Share Info With      Contact Information Obtained for      Contact Information Comments  MIKAEL(son)     [phone] or Amee Easton(daughter): [phone]        Functional Status     Row Name 11/11/19 1600       Functional Status    Functional Status Comments  Pt is independent of ADLs        Psychosocial    No documentation.       Abuse/Neglect    No documentation.       Legal    No documentation.       Substance Abuse    No documentation.       Patient Forms    No documentation.           Jovita Israel, NICHOLE

## 2019-11-11 NOTE — PLAN OF CARE
Problem: Chemotherapy Effects (Adult)  Goal: Signs and Symptoms of Listed Potential Problems Will be Absent, Minimized or Managed (Chemotherapy Effects)  Outcome: Ongoing (interventions implemented as appropriate)

## 2019-11-11 NOTE — NURSING NOTE
Outpatient Infusion • [address] • [address] • [place] [address] • [phone]      CHEMOTHERAPY EDUCATION SHEET    NAME:  Nell Montez      : 1950           DATE: 19    Booklets Given: Chemotherapy and You [x]  Eating Hints [x]    Sexuality/Fertility Books []     Chemotherapy/Biotherapy Education Sheets: (list all that apply)  Etoposide                                                                                                                                                                 Chemotherapy Regimen:  R-EPOCH, no vincristine     TOPICS EDUCATION PROVIDED EDUCATION REINFORCED COMMENTS   ANEMIA:  role of RBC, cause, s/s, ways to manage, role of transfusion [x] []    THROMBOCYTOPENIA:  role of platelet, cause, s/s, ways to prevent bleeding, things to avoid, when to seek help [x] []    NEUTROPENIA:  role of WBC, cause, infection precautions, s/s of infection, when to call MD [x] []    NUTRITION & APPETITE CHANGES:  importance of maintaining healthy diet & weight, ways to manage to improve intake, dietary consult, exercise regimen [x] []    DIARRHEA:  causes, s/s of dehydration, ways to manage, dietary changes, when to call MD [x] []    CONSTIPATION:  causes, ways to manage, dietary changes, when to call MD [] []    NAUSEA & VOMITING:  cause, use of antiemetics, dietary changes, when to call MD [x] []    MOUTH SORES:  causes, oral care, ways to manage [x] []    ALOPECIA:  cause, ways to manage, resources [x] []    INFERTILITY & SEXUALITY:  causes, fertility preservation options, sexuality changes, ways to manage, importance of birth control [] []    NERVOUS SYSTEM CHANGES:  causes, s/s, neuropathies, cognitive changes, ways to manage [x] []    PAIN:  causes, ways to manage [] [] ????   SKIN & NAIL CHANGES:  cause, s/s, ways to manage [] []    ORGAN TOXICITIES:  cause, s/s, need for diagnostic tests, labs, when to notify MD [x] []    SURVIVORSHIP:  distress, distress  assessment, secondary malignancies, early/late effects, follow-up, social issues, social support [] []    HOME CARE:  use of spill kits, storing of PO chemo, how to manage bodily fluids [x] []    MISCELLANEOUS:  drug interactions, administration, vesicant, et [x] []      Referrals:    None at this time    Notes:

## 2019-11-11 NOTE — PROGRESS NOTES
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  Received R-CHOP course #1 10/21/2019  Problem List:  Oncology/Hematology History    1. History of benign transvaginal hysterectomy 1/1/2016  2. History of reflux  3. History of transient global amnesia  4. IV dye allergy as well as allergy to barium enema  5. 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 with  Anterior mediastinal and subcarinal node, Right lower lobe nodule, Head of pancreas uptake, Paraesophageal node uptake,Liver metastases, Splenic hilar metastases, Bilateral adrenal gland uptake, Mesenteric/right retroperitoneal/right iliac uptake, Spine, right shoulder, right rib, sacrum, right hip metastases, Periumbilical subcutaneous nodule, Extensive periportal and periaortic lymphadenopathy with umbilical node excision showing 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 most likely per second opinion from [facility]    -9/26/2019 [facility] medical oncology consultation: Oncologic history as follows:  1/1/2016 vaginal hysterectomy for chronic cystic cervicitis and weakly proliferative endometrium with endometritis and a leiomyoma benign.  6/8/2016 colonoscopy benign  1/2019 apparently had CT screening of the chest that showed a couple of liver abnormalities (do not have those reports).  Unable to do with contrast and IV dye allergy.  Asymptomatic.  9/5/2019 CT of the abdomen without contrast shows at least 2 hepatic lesions, 4 cm in the right lobe of the liver and 2 cm in the left lobe of the liver.  9/11/2019 PET scan shows too numerous to count increased hypermetabolism in the anterior mediastinal, subcarinal, right lower lobe, head of the pancreas, esophagus, adjacent  "paraesophageal lymph nodes, liver, splenic hilum, left and right adrenal glands.  Also uptake in the mesentery, right retroperitoneum, right iliac chain, periumbilical subcutaneous fat, vaginal stump, and numerous bony lesions.  The bony lesions include the spine, right shoulder, right rib, sacrum, and right hip.  The 1.3 cm periumbilical subcutaneous nodule appears most amenable to biopsy.  Extensive periportal and periaortic lymphadenopathy with evidence of peritoneal metastasis for which PET CT was recommended.  9/18/2019 CT-guided fine-needle aspirate of nodule within the anterior abdominal wall \"atypical\" but unable to further typify.  9/26/2019 Dr. Kline initial visit.  Reviewed PET with patient and worrisome but nondiagnostic fine-needle aspirate.  Going to get sufficient tissue not only for diagnosis but for molecular testing that could include Biotheranostics to distinguish tissue type and 15 slides for Strata testing.  We will get our navigator on board as well.  She is having some nighttime sweats without fever though she does not describe them as bed drenching but this is new for her in the last month.  The range of possibilities here are broad and include lymphoma as well as a multiplicity of carcinomas but I need more than \"atypical\" cells on a fine-needle aspirate to ultimately decide our course.  We did talk about the likelihood that what ever we are up against it is unlikely to be curative but we will wait out her risk benefit ratio once we know the pathology.  Though they may ultimately not be helpful, while we are getting tissue I will go ahead and send off a barrage of tumor markers that might help guide us to the primary.    -10/7/2019 medical oncology office visit: Preliminary path report shows T-cell rich B-cell non-Hodgkin's lymphoma.  While awaiting final pathology we will get her port and echocardiogram and plan on treating her with Rituxan CHOP with Neulasta device for her stage IV " high-grade lymphoma.  She has a high risk IPI score.  We will get her LDH and uric acid today.  Risks including renal dysfunction, nausea, neuropathy, pancytopenia, cardiac dysfunction, infusion reactions, etc. were discussed in detail in addition to additional side effects as standard per all these medicines.  She knows that if this is the T-cell rich B-cell lymphoma that we treat this like a diffuse large B-cell lymphoma and that there is a possibility but not a guarantee of cure and it is not the majority in this setting but that plan and the statistics may change if the final pathology changes.    -10/8/2019 hepatitis B panel: Negative hepatitis B core antibody, negative hepatitis B surface antigen, reactive hepatitis B surface antibody.  Will need to monitor for any elevation of liver function studies during therapy.    -10/15/2019: Communication from Dr. Damian from [place] is that the final decision is that this is T-cell rich/histiocyte rich diffuse large B cell lymphoma.  Plan to proceed with R-CHOP with cooling cap.     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  Ejection fraction 58%.  Has cooling cap Education.  There is a international shortage of vincristine.  She will get vincristine with R-CHOP course #1 today but I am hesitant to just drop vincristine from all of her subsequent regimens but we will not have any to give her.  I plan to switch her regimen to EPOCHR but have removed the vincristine from the plan.  This will make her cooling cap a moot point since this is an 4-day infusional chemotherapy.  I will admit her on November 11 through hospitalists and plan for Neulasta shot as an outpatient on November 16.        Liver metastases (CMS/HCC)    9/26/2019 Initial Diagnosis     Liver metastases (CMS/HCC)           Diffuse  "large B-cell lymphoma of lymph nodes of multiple regions (CMS/HCC)    9/26/2019 Initial Diagnosis     Diffuse large B-cell lymphoma of lymph nodes of multiple regions (CMS/HCC)            HISTORY OF PRESENT ILLNESS:  The patient is a 69 y.o. 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  Received R-CHOP course #1 10/21/2019.      Past Medical History:   Diagnosis Date   • Abnormal Pap smear of vagina     \"Abnormal pap smear\"   • Anxiety    • Cervical dysplasia    • Cystocele    • GERD (gastroesophageal reflux disease)     INTERMITTENT- NOT MEDICATION   • Migraine headache    • Osteopenia    • Transient global amnesia     fall 2015     Past Surgical History:   Procedure Laterality Date   • BREAST CYST ASPIRATION     • BREAST SURGERY      biopsy x 3   • CERVICAL BIOPSY      PT UNAWARE OF CERVIX BIOPSY   • IMPACTED THIRD MOLAR REMOVAL      wisdom teeth extracted in which two partially impacted   • OTHER SURGICAL HISTORY  10/02/2019    surgical bx of abd wall tumor   • TONSILLECTOMY     • VAGINAL HYSTERECTOMY      with anterior repair   • VAGINAL HYSTERECTOMY W/ ANTERIOR AND POSTERIOR VAGINAL REPAIR N/A 11/15/2016    Procedure: VAGINAL HYSTERECTOMY WITH ANTERIOR VAGINAL REPAIR;  Surgeon: Henry Pitt MD;  Location: [facility] OR;  Service:        Allergies   Allergen Reactions   • Betadine [Povidone Iodine]    • Barium-Containing Compounds Unknown (See Comments)     unknown   • Codeine    • Contrast Dye Unknown (See Comments)     Unknown   • E-Mycin [Erythromycin]    • Epinephrine    • Iodine    • Other      MSG, Rema, Preservatives in eye ointment   • Procaine Other (See Comments)     DENTAL ANASTHESIA CAUSED HEART RACING- YRS AGO\" " "  • Sudafed [Pseudoephedrine Hcl]        Family History and Social History reviewed and changed as necessary      REVIEW OF SYSTEM:   Review of Systems   Constitutional: Negative for appetite change, chills, diaphoresis, fatigue, fever and unexpected weight change.   HENT:   Negative for mouth sores, sore throat and trouble swallowing.    Eyes: Negative for icterus.   Respiratory: Negative for cough, hemoptysis and shortness of breath.    Cardiovascular: Negative for chest pain, leg swelling and palpitations.   Gastrointestinal: Negative for abdominal distention, abdominal pain, blood in stool, constipation, diarrhea, nausea and vomiting.   Endocrine: Negative for hot flashes.   Genitourinary: Negative for bladder incontinence, difficulty urinating, dysuria, frequency and hematuria.    Musculoskeletal: Negative for gait problem, neck pain and neck stiffness.   Skin: Negative for rash.   Neurological: Negative for dizziness, gait problem, headaches, light-headedness and numbness.   Hematological: Negative for adenopathy. Does not bruise/bleed easily.   Psychiatric/Behavioral: Negative for depression. The patient is not nervous/anxious.    All other systems reviewed and are negative.       PHYSICAL EXAM    Vitals:    11/11/19 0804   BP: 128/62   Pulse: 92   Resp: 16   Temp: 97.7 °F (36.5 °C)   SpO2: 96%   Weight: 67.1 kg (148 lb)   Height: 157.5 cm (62\")     Constitutional: Appears well-developed and well-nourished. No distress.   ECOG: (1) Restricted in physically strenuous activity, ambulatory and able to do work of light nature  HENT:   Head: Normocephalic.   Mouth/Throat: Oropharynx is clear and moist.   Eyes: Conjunctivae are normal. Pupils are equal, round, and reactive to light. No scleral icterus.   Neck: Neck supple. No JVD present. No thyromegaly present.   Cardiovascular: Normal rate, regular rhythm and normal heart sounds.    Pulmonary/Chest: Breath sounds normal. No respiratory distress.   Abdominal: " Soft. Exhibits no distension and no mass. There is no hepatosplenomegaly. There is no tenderness. There is no rebound and no guarding.   Musculoskeletal:Exhibits no edema, tenderness or deformity.   Neurological: Alert and oriented to person, place, and time. Exhibits normal muscle tone.   Skin: No ecchymosis, no petechiae and no rash noted. Not diaphoretic. No cyanosis. Nails show no clubbing.   Psychiatric: Normal mood and affect.   Vitals reviewed.      Lab Results   Component Value Date    HGB 11.7 (L) 10/21/2019    HCT 35.1 10/21/2019    MCV 95.1 10/21/2019     10/21/2019    WBC 6.80 10/21/2019    NEUTROABS 3.90 10/21/2019    LYMPHSABS 2.40 10/21/2019    MONOSABS 0.50 10/21/2019    EOSABS 0.03 (L) 11/16/2016    BASOSABS 0.02 11/16/2016       Lab Results   Component Value Date    GLUCOSE 91 10/21/2019    BUN 18 10/21/2019    CREATININE 0.58 10/21/2019     10/21/2019    K 3.7 10/21/2019     10/21/2019    CO2 27.0 10/21/2019    CALCIUM 9.5 10/21/2019    PROTEINTOT 6.4 10/21/2019    ALBUMIN 4.20 10/21/2019    BILITOT 0.6 10/21/2019    ALKPHOS 84 10/21/2019    AST 27 10/21/2019    ALT 25 10/21/2019                   ASSESSMENT & PLAN:    1. Stage IV diffuse large B cell T-cell rich histiocyte rich lymphoma: Tolerated Rituxan CHOP course #1.  Starting to become alopecic.  Due to lack of vincristine, we are switching from R-CHOP to etoposide Rituxan, Adriamycin, prednisone, with no vincristine and this is an inpatient regimen.  We will coordinate such that she will get 100 mg of prednisone daily, at 300 mg allopurinol daily while inpatient and she will need a prescription from the hospitalists for ongoing allopurinol 100 mg daily upon discharge to last for a month with 1 refill.  We will set her up for a Neulasta shot on November 16 Saturday, assuming that 24 hours after her last dose of chemotherapy on Friday, 15 November.  I spoken with Dr. Guaman who I am told from the bed control is the  accepting hospitalist but he has communicated back that he is not sure he will be the one caring for her but will communicate this information to whoever needs to know it.  Discussed with patient face-to-face 15 minutes greater than 50% spent counseling.  Will arrange for next course for admission on 12/2/2019.      Ranulfo Kline MD    11/11/2019

## 2019-11-12 LAB
ALBUMIN SERPL-MCNC: 3.7 G/DL (ref 3.5–5.2)
ALBUMIN/GLOB SERPL: 1.6 G/DL
ALP SERPL-CCNC: 77 U/L (ref 39–117)
ALT SERPL W P-5'-P-CCNC: 13 U/L (ref 1–33)
ANION GAP SERPL CALCULATED.3IONS-SCNC: 10 MMOL/L (ref 5–15)
AST SERPL-CCNC: 18 U/L (ref 1–32)
BASOPHILS # BLD AUTO: 0.03 10*3/MM3 (ref 0–0.2)
BASOPHILS NFR BLD AUTO: 0.4 % (ref 0–1.5)
BILIRUB SERPL-MCNC: 0.3 MG/DL (ref 0.2–1.2)
BUN BLD-MCNC: 14 MG/DL (ref 8–23)
BUN/CREAT SERPL: 24.1 (ref 7–25)
CALCIUM SPEC-SCNC: 9 MG/DL (ref 8.6–10.5)
CHLORIDE SERPL-SCNC: 108 MMOL/L (ref 98–107)
CO2 SERPL-SCNC: 25 MMOL/L (ref 22–29)
CREAT BLD-MCNC: 0.58 MG/DL (ref 0.57–1)
DEPRECATED RDW RBC AUTO: 47.8 FL (ref 37–54)
EOSINOPHIL # BLD AUTO: 0 10*3/MM3 (ref 0–0.4)
EOSINOPHIL NFR BLD AUTO: 0 % (ref 0.3–6.2)
ERYTHROCYTE [DISTWIDTH] IN BLOOD BY AUTOMATED COUNT: 14.3 % (ref 12.3–15.4)
GFR SERPL CREATININE-BSD FRML MDRD: 102 ML/MIN/1.73
GLOBULIN UR ELPH-MCNC: 2.3 GM/DL
GLUCOSE BLD-MCNC: 111 MG/DL (ref 65–99)
HCT VFR BLD AUTO: 33 % (ref 34–46.6)
HGB BLD-MCNC: 10.9 G/DL (ref 12–15.9)
IMM GRANULOCYTES # BLD AUTO: 0.04 10*3/MM3 (ref 0–0.05)
IMM GRANULOCYTES NFR BLD AUTO: 0.5 % (ref 0–0.5)
LDH SERPL-CCNC: 186 U/L (ref 135–214)
LYMPHOCYTES # BLD AUTO: 1 10*3/MM3 (ref 0.7–3.1)
LYMPHOCYTES NFR BLD AUTO: 12.5 % (ref 19.6–45.3)
MAGNESIUM SERPL-MCNC: 2 MG/DL (ref 1.6–2.4)
MCH RBC QN AUTO: 31.9 PG (ref 26.6–33)
MCHC RBC AUTO-ENTMCNC: 33 G/DL (ref 31.5–35.7)
MCV RBC AUTO: 96.5 FL (ref 79–97)
MONOCYTES # BLD AUTO: 0.47 10*3/MM3 (ref 0.1–0.9)
MONOCYTES NFR BLD AUTO: 5.9 % (ref 5–12)
NEUTROPHILS # BLD AUTO: 6.49 10*3/MM3 (ref 1.7–7)
NEUTROPHILS NFR BLD AUTO: 80.7 % (ref 42.7–76)
NRBC BLD AUTO-RTO: 0 /100 WBC (ref 0–0.2)
PHOSPHATE SERPL-MCNC: 2.2 MG/DL (ref 2.5–4.5)
PLATELET # BLD AUTO: 340 10*3/MM3 (ref 140–450)
PMV BLD AUTO: 9.5 FL (ref 6–12)
POTASSIUM BLD-SCNC: 3.6 MMOL/L (ref 3.5–5.2)
PROT SERPL-MCNC: 6 G/DL (ref 6–8.5)
RBC # BLD AUTO: 3.42 10*6/MM3 (ref 3.77–5.28)
SODIUM BLD-SCNC: 143 MMOL/L (ref 136–145)
URATE SERPL-MCNC: 2 MG/DL (ref 2.4–5.7)
WBC NRBC COR # BLD: 8.03 10*3/MM3 (ref 3.4–10.8)

## 2019-11-12 PROCEDURE — 25010000002 ONDANSETRON PER 1 MG: Performed by: INTERNAL MEDICINE

## 2019-11-12 PROCEDURE — 84100 ASSAY OF PHOSPHORUS: CPT | Performed by: INTERNAL MEDICINE

## 2019-11-12 PROCEDURE — 25010000002 ETOPOSIDE 500 MG/25ML SOLUTION 25 ML VIAL: Performed by: INTERNAL MEDICINE

## 2019-11-12 PROCEDURE — 83615 LACTATE (LD) (LDH) ENZYME: CPT | Performed by: INTERNAL MEDICINE

## 2019-11-12 PROCEDURE — 25010000002 LORAZEPAM PER 2 MG: Performed by: INTERNAL MEDICINE

## 2019-11-12 PROCEDURE — 99232 SBSQ HOSP IP/OBS MODERATE 35: CPT | Performed by: NURSE PRACTITIONER

## 2019-11-12 PROCEDURE — 99232 SBSQ HOSP IP/OBS MODERATE 35: CPT | Performed by: HOSPITALIST

## 2019-11-12 PROCEDURE — 80053 COMPREHEN METABOLIC PANEL: CPT | Performed by: INTERNAL MEDICINE

## 2019-11-12 PROCEDURE — 85025 COMPLETE CBC W/AUTO DIFF WBC: CPT | Performed by: INTERNAL MEDICINE

## 2019-11-12 PROCEDURE — 83735 ASSAY OF MAGNESIUM: CPT | Performed by: INTERNAL MEDICINE

## 2019-11-12 PROCEDURE — 63710000001 PREDNISONE PER 5 MG: Performed by: INTERNAL MEDICINE

## 2019-11-12 PROCEDURE — 25010000002 DOXORUBICIN PER 10 MG: Performed by: INTERNAL MEDICINE

## 2019-11-12 PROCEDURE — 84550 ASSAY OF BLOOD/URIC ACID: CPT | Performed by: INTERNAL MEDICINE

## 2019-11-12 PROCEDURE — 25010000002 ENOXAPARIN PER 10 MG: Performed by: HOSPITALIST

## 2019-11-12 RX ORDER — CALCIUM CARBONATE 750 MG/1
750 TABLET, CHEWABLE ORAL 3 TIMES DAILY PRN
Status: DISCONTINUED | OUTPATIENT
Start: 2019-11-12 | End: 2019-11-15 | Stop reason: HOSPADM

## 2019-11-12 RX ORDER — CHOLECALCIFEROL (VITAMIN D3) 125 MCG
5 CAPSULE ORAL NIGHTLY PRN
Status: DISCONTINUED | OUTPATIENT
Start: 2019-11-12 | End: 2019-11-15 | Stop reason: HOSPADM

## 2019-11-12 RX ADMIN — MELATONIN TAB 5 MG 5 MG: 5 TAB at 22:49

## 2019-11-12 RX ADMIN — SODIUM CHLORIDE, PRESERVATIVE FREE 10 ML: 5 INJECTION INTRAVENOUS at 20:54

## 2019-11-12 RX ADMIN — CALCIUM CARBONATE 750 MG: 750 TABLET ORAL at 03:55

## 2019-11-12 RX ADMIN — ONDANSETRON 16 MG: 2 INJECTION INTRAMUSCULAR; INTRAVENOUS at 18:18

## 2019-11-12 RX ADMIN — SODIUM CHLORIDE: 9 INJECTION, SOLUTION INTRAVENOUS at 18:46

## 2019-11-12 RX ADMIN — ENOXAPARIN SODIUM 40 MG: 40 INJECTION SUBCUTANEOUS at 13:03

## 2019-11-12 RX ADMIN — ALLOPURINOL 300 MG: 300 TABLET ORAL at 08:36

## 2019-11-12 RX ADMIN — PREDNISONE 100 MG: 50 TABLET ORAL at 08:36

## 2019-11-12 RX ADMIN — PANTOPRAZOLE SODIUM 40 MG: 40 TABLET, DELAYED RELEASE ORAL at 06:02

## 2019-11-12 RX ADMIN — POLYETHYLENE GLYCOL 3350 17 G: 17 POWDER, FOR SOLUTION ORAL at 22:40

## 2019-11-12 RX ADMIN — LORAZEPAM 0.5 MG: 2 INJECTION INTRAMUSCULAR; INTRAVENOUS at 03:53

## 2019-11-12 RX ADMIN — DOCUSATE SODIUM 100 MG: 100 CAPSULE, LIQUID FILLED ORAL at 20:54

## 2019-11-12 RX ADMIN — DOCUSATE SODIUM 100 MG: 100 CAPSULE, LIQUID FILLED ORAL at 08:36

## 2019-11-12 NOTE — PROGRESS NOTES
[facility] Medicine Services  PROGRESS NOTE    Patient Name: Nell Montez  : 1950  MRN: [id number]    Date of Admission: 2019  Primary Care Physician: Rhean Fernandez MD    Subjective   Subjective     CC: Direct admission for chemotherapy    HPI: She had a flushing event which she related to prednisone.  Otherwise she is doing well on chemotherapy.  No significant symptoms.  No family in room today.  No bladder or bowel complaints.    Review of Systems    Gen- No fevers, chills  CV- No chest pain, palpitations  Resp- No cough, dyspnea  GI- No N/V/D, abd pain    Objective   Objective     Vital Signs:   Temp:  [97.5 °F (36.4 °C)-98.8 °F (37.1 °C)] 97.8 °F (36.6 °C)  Heart Rate:  [58-85] 58  Resp:  [16] 16  BP: (113-141)/(56-68) 116/58        Physical Exam:    Constitutional: Awake, alert  Eyes: PERRLA, sclerae anicteric, no conjunctival injection  HENT: NCAT, mucous membranes moist  Neck: Supple, no JVD, trachea midline  Respiratory: Clear to auscultation bilaterally, nonlabored respirations   Cardiovascular: RRR, no murmurs, rubs, or gallops, palpable pedal pulses bilaterally  Gastrointestinal: Positive bowel sounds, soft, nontender, nondistended  Musculoskeletal: No bilateral ankle edema, no clubbing or cyanosis to extremities  Psychiatric: Appropriate affect, cooperative  Neurologic: Oriented x 3, strength symmetric in all extremities, Cranial Nerves grossly intact to confrontation, speech clear  Skin: No rashes    Results Reviewed:    Results from last 7 days   Lab Units 19  1035 19  1018   WBC 10*3/mm3 8.03 5.10   HEMOGLOBIN g/dL 10.9* 10.2*   HEMATOCRIT % 33.0* 31.5*   PLATELETS 10*3/mm3 340 309     Results from last 7 days   Lab Units 19  1035 19  1018   SODIUM mmol/L 143 139   POTASSIUM mmol/L 3.6 3.8   CHLORIDE mmol/L 108* 105   CO2 mmol/L 25.0 25.0   BUN mg/dL 14 20   CREATININE mg/dL 0.58 0.54*   GLUCOSE mg/dL 111* 108*   CALCIUM mg/dL  9.0 9.0   ALT (SGPT) U/L 13 11   AST (SGOT) U/L 18 17     Estimated Creatinine Clearance: 57.9 mL/min (by C-G formula based on SCr of 0.58 mg/dL).    Microbiology Results Abnormal     None          Imaging Results (Last 24 Hours)     ** No results found for the last 24 hours. **          Results for orders placed during the hospital encounter of 10/11/19   Adult Transthoracic Echo Complete W/ Cont if Necessary Per Protocol    Narrative · Left ventricular systolic function is normal.  · Calculated EF = 58.4%. Estimated EF appears to be in the range of 56 -   60%. Global Longitudinal LV strain = -16.7%.  · Left ventricular wall thickness is consistent with mild septal   asymmetric hypertrophy.  · Left ventricular diastolic dysfunction (grade I) consistent with   impaired relaxation.  · Right ventricular cavity is mild-to-moderately dilated.          I have reviewed the medications:  Scheduled Meds:  allopurinol 300 mg Oral Daily   cetirizine 10 mg Oral Nightly   docusate sodium 100 mg Oral BID   DOXOrubicin (ADRIAMYCIN), etoposide and vinCRIStine chemo IVPB (pediatric)  Intravenous Once   DOXOrubicin (ADRIAMYCIN), etoposide, and vincristine chemo IVPB  Intravenous Once   enoxaparin 40 mg Subcutaneous Q24H   ondansetron (ZOFRAN) IVPB 50 mL 16 mg Intravenous Once   pantoprazole 40 mg Oral Q AM   predniSONE 100 mg Oral Daily With Breakfast   sodium chloride 10 mL Intravenous Q12H     Continuous Infusions:  sodium chloride 75 mL/hr Last Rate: 75 mL/hr (11/11/19 1329)     PRN Meds:.calcium carbonate EX  •  diphenhydrAMINE  •  famotidine  •  hydrocortisone sodium succinate  •  LORazepam  •  meperidine  •  ondansetron  •  prochlorperazine  •  sodium chloride      Assessment/Plan   Assessment / Plan     Active Hospital Problems    Diagnosis  POA   • Diffuse large B-cell lymphoma of lymph nodes of multiple regions (CMS/HCC) [C83.38]  Yes      Resolved Hospital Problems   No resolved problems to display.        Brief Hospital  Course to date:  Nell Montez is a 69 y.o. female directly admitted for the management of lymphoma    Diffuse large B-cell lymphoma  -Currently getting inpatient chemotherapy  -Continue prednisone 100 mg daily  -On allopurinol 300 mg daily  Hyperuricemia  -Continue allopurinol 3 mg daily  Allergies  -Zyrtec  GERD  -Pantoprazole 40 mg daily      DVT Prophylaxis: Lovenox 40 mg daily    Disposition: I expect the patient to be discharged on Friday    CODE STATUS:   Code Status and Medical Interventions:   Ordered at: 11/11/19 1006     Level Of Support Discussed With:    Patient     Code Status:    CPR     Medical Interventions (Level of Support Prior to Arrest):    Full       Electronically signed by Navid Aguilar MD, 11/12/19, 2:28 PM.

## 2019-11-12 NOTE — PROGRESS NOTES
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  Received R-CHOP course #1 10/21/2019  Problem List:  Oncology/Hematology History    1. History of benign transvaginal hysterectomy 1/1/2016  2. History of reflux  3. History of transient global amnesia  4. IV dye allergy as well as allergy to barium enema  5. 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 with  Anterior mediastinal and subcarinal node, Right lower lobe nodule, Head of pancreas uptake, Paraesophageal node uptake,Liver metastases, Splenic hilar metastases, Bilateral adrenal gland uptake, Mesenteric/right retroperitoneal/right iliac uptake, Spine, right shoulder, right rib, sacrum, right hip metastases, Periumbilical subcutaneous nodule, Extensive periportal and periaortic lymphadenopathy with umbilical node excision showing 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 most likely per second opinion from [facility]    -9/26/2019 [facility], [place], operated by [facility] medical oncology consultation: Oncologic history as follows:  1/1/2016 vaginal hysterectomy for chronic cystic cervicitis and weakly proliferative endometrium with endometritis and a leiomyoma benign.  6/8/2016 colonoscopy benign  1/2019 apparently had CT screening of the chest that showed a couple of liver abnormalities (do not have those reports).  Unable to do with contrast and IV dye allergy.  Asymptomatic.  9/5/2019 CT of the abdomen without contrast shows at least 2 hepatic lesions, 4 cm in the right lobe of the liver and 2 cm in the left lobe of the liver.  9/11/2019 PET scan shows too numerous to count increased hypermetabolism in the anterior mediastinal, subcarinal, right lower lobe, head of the pancreas, esophagus, adjacent  "paraesophageal lymph nodes, liver, splenic hilum, left and right adrenal glands.  Also uptake in the mesentery, right retroperitoneum, right iliac chain, periumbilical subcutaneous fat, vaginal stump, and numerous bony lesions.  The bony lesions include the spine, right shoulder, right rib, sacrum, and right hip.  The 1.3 cm periumbilical subcutaneous nodule appears most amenable to biopsy.  Extensive periportal and periaortic lymphadenopathy with evidence of peritoneal metastasis for which PET CT was recommended.  9/18/2019 CT-guided fine-needle aspirate of nodule within the anterior abdominal wall \"atypical\" but unable to further typify.  9/26/2019 Dr. Kline initial visit.  Reviewed PET with patient and worrisome but nondiagnostic fine-needle aspirate.  Going to get sufficient tissue not only for diagnosis but for molecular testing that could include Biotheranostics to distinguish tissue type and 15 slides for Strata testing.  We will get our navigator on board as well.  She is having some nighttime sweats without fever though she does not describe them as bed drenching but this is new for her in the last month.  The range of possibilities here are broad and include lymphoma as well as a multiplicity of carcinomas but I need more than \"atypical\" cells on a fine-needle aspirate to ultimately decide our course.  We did talk about the likelihood that what ever we are up against it is unlikely to be curative but we will wait out her risk benefit ratio once we know the pathology.  Though they may ultimately not be helpful, while we are getting tissue I will go ahead and send off a barrage of tumor markers that might help guide us to the primary.    -10/7/2019 medical oncology office visit: Preliminary path report shows T-cell rich B-cell non-Hodgkin's lymphoma.  While awaiting final pathology we will get her port and echocardiogram and plan on treating her with Rituxan CHOP with Neulasta device for her stage IV " high-grade lymphoma.  She has a high risk IPI score.  We will get her LDH and uric acid today.  Risks including renal dysfunction, nausea, neuropathy, pancytopenia, cardiac dysfunction, infusion reactions, etc. were discussed in detail in addition to additional side effects as standard per all these medicines.  She knows that if this is the T-cell rich B-cell lymphoma that we treat this like a diffuse large B-cell lymphoma and that there is a possibility but not a guarantee of cure and it is not the majority in this setting but that plan and the statistics may change if the final pathology changes.    -10/8/2019 hepatitis B panel: Negative hepatitis B core antibody, negative hepatitis B surface antigen, reactive hepatitis B surface antibody.  Will need to monitor for any elevation of liver function studies during therapy.    -10/15/2019: Communication from Dr. Damian from [place] is that the final decision is that this is T-cell rich/histiocyte rich diffuse large B cell lymphoma.  Plan to proceed with R-CHOP with cooling cap.     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  Ejection fraction 58%.  Has cooling cap Education.  There is a international shortage of vincristine.  She will get vincristine with R-CHOP course #1 today but I am hesitant to just drop vincristine from all of her subsequent regimens but we will not have any to give her.  I plan to switch her regimen to EPOCHR but have removed the vincristine from the plan.  This will make her cooling cap a moot point since this is an 4-day infusional chemotherapy.  I will admit her on November 11 through hospitalists and plan for Neulasta shot as an outpatient on November 16.        Liver metastases (CMS/HCC)    9/26/2019 Initial Diagnosis     Liver metastases (CMS/HCC)           Diffuse  "large B-cell lymphoma of lymph nodes of multiple regions (CMS/HCC)    9/26/2019 Initial Diagnosis     Diffuse large B-cell lymphoma of lymph nodes of multiple regions (CMS/HCC)            HISTORY OF PRESENT ILLNESS:  Patient tolerating chemotherapy at this time.  Took Ativan last night because she had trouble sleeping.  She denies nausea or vomiting and eating well.  Friend at bedside.        Past Medical History:   Diagnosis Date   • Abnormal Pap smear of vagina     \"Abnormal pap smear\"   • Anxiety    • Cervical dysplasia    • Cystocele    • GERD (gastroesophageal reflux disease)     INTERMITTENT- NOT MEDICATION   • Migraine headache    • Osteopenia    • Transient global amnesia     fall 2015     Past Surgical History:   Procedure Laterality Date   • BREAST CYST ASPIRATION     • BREAST SURGERY      biopsy x 3   • CERVICAL BIOPSY      PT UNAWARE OF CERVIX BIOPSY   • IMPACTED THIRD MOLAR REMOVAL      wisdom teeth extracted in which two partially impacted   • OTHER SURGICAL HISTORY  10/02/2019    surgical bx of abd wall tumor   • TONSILLECTOMY     • VAGINAL HYSTERECTOMY      with anterior repair   • VAGINAL HYSTERECTOMY W/ ANTERIOR AND POSTERIOR VAGINAL REPAIR N/A 11/15/2016    Procedure: VAGINAL HYSTERECTOMY WITH ANTERIOR VAGINAL REPAIR;  Surgeon: Henry Pitt MD;  Location: [facility] OR;  Service:        Allergies   Allergen Reactions   • Betadine [Povidone Iodine]    • Barium-Containing Compounds Unknown (See Comments)     unknown   • Codeine    • Contrast Dye Unknown (See Comments)     Unknown   • E-Mycin [Erythromycin]    • Epinephrine    • Iodine    • Other      MSG, Rema, Preservatives in eye ointment   • Procaine Other (See Comments)     DENTAL ANASTHESIA CAUSED HEART RACING- YRS AGO\"   • Sudafed [Pseudoephedrine Hcl]        Family History and Social History reviewed and changed as necessary      REVIEW OF SYSTEM:   Review of Systems   Constitutional: Negative for appetite change, chills, diaphoresis, " fatigue, fever and unexpected weight change.   HENT:   Negative for mouth sores, sore throat and trouble swallowing.    Eyes: Negative for icterus.   Respiratory: Negative for cough, hemoptysis and shortness of breath.    Cardiovascular: Negative for chest pain, leg swelling and palpitations.   Gastrointestinal: Negative for abdominal distention, abdominal pain, blood in stool, constipation, diarrhea, nausea and vomiting.   Endocrine: Negative for hot flashes.   Genitourinary: Negative for bladder incontinence, difficulty urinating, dysuria, frequency and hematuria.    Musculoskeletal: Negative for gait problem, neck pain and neck stiffness.   Skin: Negative for rash.   Neurological: Negative for dizziness, gait problem, headaches, light-headedness and numbness.   Hematological: Negative for adenopathy. Does not bruise/bleed easily.   Psychiatric/Behavioral: Negative for depression. The patient is not nervous/anxious.    All other systems reviewed and are negative.       PHYSICAL EXAM    Vitals:    11/11/19 2300 11/12/19 0300 11/12/19 0700 11/12/19 1100   BP: 128/62 120/63 121/68 116/58   BP Location: Right arm Right arm Right arm Right arm   Patient Position: Lying Lying Sitting Sitting   Pulse: 83 73 85 58   Resp: 16 16 16 16   Temp: 98.8 °F (37.1 °C) 98.4 °F (36.9 °C) 97.5 °F (36.4 °C) 97.8 °F (36.6 °C)   TempSrc: Oral Oral Oral Oral   SpO2: 93% 95% 98% 96%   Weight:       Height:         Constitutional: Appears well-developed and well-nourished. No distress.   ECOG: (1) Restricted in physically strenuous activity, ambulatory and able to do work of light nature  HENT:   Head: Normocephalic.   Mouth/Throat: Oropharynx is clear and moist.   Eyes: Conjunctivae are normal.  No scleral icterus.   Neck: Neck supple. No JVD present. No thyromegaly present.   Cardiovascular: Normal rate, regular rhythm and normal heart sounds.    Pulmonary/Chest: Breath sounds normal. No respiratory distress.   Abdominal: Soft. Exhibits  no distension and no mass. There is no hepatosplenomegaly. There is no tenderness. There is no rebound and no guarding.   Musculoskeletal:Exhibits no edema, tenderness or deformity.   Neurological: Alert and oriented to person, place, and time. Exhibits normal muscle tone.   Skin: No ecchymosis, no petechiae and no rash noted. Not diaphoretic. No cyanosis. Nails show no clubbing. Port-A-Cath without redness.  Psychiatric: Normal mood and affect.   Vitals reviewed.      Lab Results   Component Value Date    HGB 10.9 (L) 11/12/2019    HCT 33.0 (L) 11/12/2019    MCV 96.5 11/12/2019     11/12/2019    WBC 8.03 11/12/2019    NEUTROABS 6.49 11/12/2019    LYMPHSABS 1.00 11/12/2019    MONOSABS 0.47 11/12/2019    EOSABS 0.00 11/12/2019    BASOSABS 0.03 11/12/2019       Lab Results   Component Value Date    GLUCOSE 111 (H) 11/12/2019    BUN 14 11/12/2019    CREATININE 0.58 11/12/2019     11/12/2019    K 3.6 11/12/2019     (H) 11/12/2019    CO2 25.0 11/12/2019    CALCIUM 9.0 11/12/2019    PROTEINTOT 6.0 11/12/2019    ALBUMIN 3.70 11/12/2019    BILITOT 0.3 11/12/2019    ALKPHOS 77 11/12/2019    AST 18 11/12/2019    ALT 13 11/12/2019             ASSESSMENT & PLAN:    1. Stage IV diffuse large B cell T-cell rich histiocyte rich lymphoma: Tolerated Rituxan CHOP course #1.  Due to lack of vincristine, we are switching from R-CHOP to etoposide, Rituxan, Adriamycin, prednisone, with no vincristine and this is an inpatient regimen.  She began yesterday and is tolerating at this time.  Continue prednisone 100 mg daily and allopurinol 300 mg while inpatient.  She will need a prescription from the hospitalist for ongoing allopurinol 100 mg daily upon discharge for a month with 1 refill.  She will need Neulasta shot at least 24 hours after completion of chemotherapy which looks like will be due Saturday evening.  Since infusion will not be open at that time, we will set her up to come in on Monday morning for her shot.   This will fall within the 72-hour window for Neulasta.  We will arrange admission for cycle # 3 on 12/2/2019.      Selina Wright, APRN    11/11/2019

## 2019-11-12 NOTE — PLAN OF CARE
Problem: Patient Care Overview  Goal: Plan of Care Review  Outcome: Ongoing (interventions implemented as appropriate)   11/11/19 1950 11/12/19 0328   Plan of Care Review   Progress --  no change   OTHER   Outcome Summary --  VSS. Resting on shift. Pt tolerating chemo well on shift. MANY questions for nurse. Slightly anxious. Friend at BS. At times seems a little confused as well. C/O loud sounds in room that have not been confirmed by anyone else. UOP adaquate. Pleasant demeanor. Continue to monitor.   Coping/Psychosocial   Plan of Care Reviewed With patient;daughter --      Goal: Individualization and Mutuality  Outcome: Ongoing (interventions implemented as appropriate)    Goal: Discharge Needs Assessment  Outcome: Ongoing (interventions implemented as appropriate)    Goal: Interprofessional Rounds/Family Conf  Outcome: Ongoing (interventions implemented as appropriate)      Problem: Chemotherapy Effects (Adult)  Goal: Signs and Symptoms of Listed Potential Problems Will be Absent, Minimized or Managed (Chemotherapy Effects)  Outcome: Ongoing (interventions implemented as appropriate)

## 2019-11-12 NOTE — PROGRESS NOTES
Clinical Nutrition   Reason For Visit: Identified at risk by screening criteria, MST score 2+    Patient Name: Nell Montez  YOB: 1950  MRN: [id number]  Date of Encounter: 11/12/19 11:46 AM  Admission date: 11/11/2019      Nutrition Assessment     Admission Problem List:  Stage IV diffuse large B cell lymphoma  Chemotherapy  Hyperuricemia, chemo related  Positive hepatitis B      PMH: She  has a past medical history of Abnormal Pap smear of vagina, Anxiety, Cervical dysplasia, Cystocele, GERD (gastroesophageal reflux disease), Migraine headache, Osteopenia, and Transient global amnesia.   PSxH: She  has a past surgical history that includes Tonsillectomy; Cervical biopsy; Impacted third molar removal; Breast surgery; Vaginal hysterectomy w/ anterior and posterior vaginal repair (N/A, 11/15/2016); Vaginal hysterectomy; Breast cyst aspiration; and Other surgical history (10/02/2019).        Reported/Observed/Food/Nutrition Related History     Pt reports that she has been eating well PTA. States that she drinks vanilla or strawberry Boost/Ensure 2x/day at home and occasionally will drink a peach-flavored Premier Protein supplement. Reports that she was given a Boost Very High Calorie supplement last night and drank all of it. Pt states that she is not a picky eater. Reports that she is sensitive to MSG, stating that if she eats too much it will cause her to have a severe headache.     Pr provided some food preferences:  Likes: beef, roast beef, beef stew, meatloaf, milk with all meal, ginger ale      Anthropometrics   Height: 62 in  Weight: 148 lb (11/11), no method documented  BMI: 27.1  BMI classification: Overweight: 25.0-29.9kg/m2      UBW: 178 lb per pt  Weight change: Pt reports that she weighed 178 lb at the start of the year. States that she has lost 30 lb over the past 11 months. Reports that weight loss was unintentional and intentional. States that she wanted to lose weight and started  eating better, but that she also was eating less due to cancer. Pt reports that she is happy with her current weight of 148 lb and does not want to lose anymore weight at this time.     Per outpatient oncology RD note (10/21/19):  Weight: 149 lbs.  Usual Body Weight: ~174 lbs.   Weight has decreased ~25 pounds over last ~4 months; 14.4% weight loss       Date Weight (kg) Weight (lbs) Weight Method   11/11/2019 - - Bed scale   11/11/2019 67.132 kg 148 lb -   11/11/2019 67.132 kg 148 lb -   10/21/2019 67.586 kg 149 lb -   10/11/2019 67.586 kg 149 lb -   10/8/2019 67.586 kg 149 lb -   10/7/2019 68.493 kg 151 lb -   9/26/2019 68.947 kg 152 lb -   9/18/2019 67.858 kg 149 lb 9.6 oz Standing scale   9/8/2017 78.019 kg 172 lb -   3/10/2017 78.019 kg 172 lb -   12/9/2016 76.658 kg 169 lb -   11/15/2016 78.019 kg 172 lb Stated   11/14/2016 78.2 kg 172 lb 6.4 oz -   11/11/2016 77.111 kg 170 lb -       Labs reviewed   Labs reviewed: Yes  Results from last 7 days   Lab Units 11/11/19  1018   SODIUM mmol/L 139   POTASSIUM mmol/L 3.8   CHLORIDE mmol/L 105   CO2 mmol/L 25.0   BUN mg/dL 20   CREATININE mg/dL 0.54*   GLUCOSE mg/dL 108*   CALCIUM mg/dL 9.0   PHOSPHORUS mg/dL 3.1   MAGNESIUM mg/dL 2.0     Results from last 7 days   Lab Units 11/12/19  1035 11/11/19  1018   WBC 10*3/mm3 8.03 5.10   ALBUMIN g/dL  --  3.70         Lab Results   Lab Value Date/Time    HGBA1C 5.20 11/14/2016 1129     Medications reviewed   Medications reviewed: Yes  Pertinent: colace, doxorubicin, protonix, steroids  PRN: ativan       Current Nutrition Prescription   PO: Diet Regular    Evaluation of Received Nutrient/Fluid Intake:  50%/3 meals      Nutrition Diagnosis   11/12  Problem Inadequate oral intake   Etiology Clinical condition   Signs/Symptoms 50%/3 meals       11/12  Problem Unintended weight loss   Etiology Decreased PO intake   Signs/Symptoms Pt reports unintentional and intentional wt loss of 30 lb over the past 11 months.        Intervention    Intervention: Follow treatment progress, Care plan reviewed, Interview for preferences, Encourage intake, Supplement provided   -Will send vanilla or strawberry Boost Plus 3x/day  -Will add 2% milk to all meal trays      Goal:   General: Nutrition support treatment  PO: Increase intake      Monitoring/Evaluation:       Monitoring/Evaluation: Per protocol, PO intake, Supplement intake, Pertinent labs, Weight, Symptoms     Will Continue to follow per protocol  Tracee Red MS RD/LD CNSC  Time Spent: 30 minutes

## 2019-11-13 LAB
ALBUMIN SERPL-MCNC: 3.1 G/DL (ref 3.5–5.2)
ALBUMIN/GLOB SERPL: 1.7 G/DL
ALP SERPL-CCNC: 57 U/L (ref 39–117)
ALT SERPL W P-5'-P-CCNC: 7 U/L (ref 1–33)
ANION GAP SERPL CALCULATED.3IONS-SCNC: 8 MMOL/L (ref 5–15)
AST SERPL-CCNC: 13 U/L (ref 1–32)
BASOPHILS # BLD AUTO: 0.03 10*3/MM3 (ref 0–0.2)
BASOPHILS NFR BLD AUTO: 0.4 % (ref 0–1.5)
BILIRUB SERPL-MCNC: 0.2 MG/DL (ref 0.2–1.2)
BUN BLD-MCNC: 16 MG/DL (ref 8–23)
BUN/CREAT SERPL: 27.6 (ref 7–25)
CALCIUM SPEC-SCNC: 8.4 MG/DL (ref 8.6–10.5)
CHLORIDE SERPL-SCNC: 110 MMOL/L (ref 98–107)
CO2 SERPL-SCNC: 24 MMOL/L (ref 22–29)
CREAT BLD-MCNC: 0.58 MG/DL (ref 0.57–1)
DEPRECATED RDW RBC AUTO: 48.7 FL (ref 37–54)
EOSINOPHIL # BLD AUTO: 0.01 10*3/MM3 (ref 0–0.4)
EOSINOPHIL NFR BLD AUTO: 0.1 % (ref 0.3–6.2)
ERYTHROCYTE [DISTWIDTH] IN BLOOD BY AUTOMATED COUNT: 14.5 % (ref 12.3–15.4)
GFR SERPL CREATININE-BSD FRML MDRD: 102 ML/MIN/1.73
GLOBULIN UR ELPH-MCNC: 1.8 GM/DL
GLUCOSE BLD-MCNC: 82 MG/DL (ref 65–99)
HCT VFR BLD AUTO: 28.3 % (ref 34–46.6)
HGB BLD-MCNC: 8.9 G/DL (ref 12–15.9)
IMM GRANULOCYTES # BLD AUTO: 0.04 10*3/MM3 (ref 0–0.05)
IMM GRANULOCYTES NFR BLD AUTO: 0.6 % (ref 0–0.5)
LDH SERPL-CCNC: 161 U/L (ref 135–214)
LYMPHOCYTES # BLD AUTO: 1.44 10*3/MM3 (ref 0.7–3.1)
LYMPHOCYTES NFR BLD AUTO: 20 % (ref 19.6–45.3)
MAGNESIUM SERPL-MCNC: 2 MG/DL (ref 1.6–2.4)
MCH RBC QN AUTO: 30.6 PG (ref 26.6–33)
MCHC RBC AUTO-ENTMCNC: 31.4 G/DL (ref 31.5–35.7)
MCV RBC AUTO: 97.3 FL (ref 79–97)
MONOCYTES # BLD AUTO: 0.57 10*3/MM3 (ref 0.1–0.9)
MONOCYTES NFR BLD AUTO: 7.9 % (ref 5–12)
NEUTROPHILS # BLD AUTO: 5.12 10*3/MM3 (ref 1.7–7)
NEUTROPHILS NFR BLD AUTO: 71 % (ref 42.7–76)
NRBC BLD AUTO-RTO: 0 /100 WBC (ref 0–0.2)
PHOSPHATE SERPL-MCNC: 2.7 MG/DL (ref 2.5–4.5)
PLATELET # BLD AUTO: 241 10*3/MM3 (ref 140–450)
PMV BLD AUTO: 9.9 FL (ref 6–12)
POTASSIUM BLD-SCNC: 4.1 MMOL/L (ref 3.5–5.2)
PROT SERPL-MCNC: 4.9 G/DL (ref 6–8.5)
RBC # BLD AUTO: 2.91 10*6/MM3 (ref 3.77–5.28)
SODIUM BLD-SCNC: 142 MMOL/L (ref 136–145)
URATE SERPL-MCNC: 2.4 MG/DL (ref 2.4–5.7)
WBC NRBC COR # BLD: 7.21 10*3/MM3 (ref 3.4–10.8)

## 2019-11-13 PROCEDURE — 84550 ASSAY OF BLOOD/URIC ACID: CPT | Performed by: INTERNAL MEDICINE

## 2019-11-13 PROCEDURE — 25010000002 ONDANSETRON PER 1 MG: Performed by: INTERNAL MEDICINE

## 2019-11-13 PROCEDURE — 63710000001 PREDNISONE PER 5 MG: Performed by: INTERNAL MEDICINE

## 2019-11-13 PROCEDURE — 99232 SBSQ HOSP IP/OBS MODERATE 35: CPT | Performed by: NURSE PRACTITIONER

## 2019-11-13 PROCEDURE — 84100 ASSAY OF PHOSPHORUS: CPT | Performed by: INTERNAL MEDICINE

## 2019-11-13 PROCEDURE — 83615 LACTATE (LD) (LDH) ENZYME: CPT | Performed by: INTERNAL MEDICINE

## 2019-11-13 PROCEDURE — 25010000002 ENOXAPARIN PER 10 MG: Performed by: HOSPITALIST

## 2019-11-13 PROCEDURE — 25010000002 ETOPOSIDE 500 MG/25ML SOLUTION 25 ML VIAL: Performed by: INTERNAL MEDICINE

## 2019-11-13 PROCEDURE — 99232 SBSQ HOSP IP/OBS MODERATE 35: CPT | Performed by: HOSPITALIST

## 2019-11-13 PROCEDURE — 83735 ASSAY OF MAGNESIUM: CPT | Performed by: INTERNAL MEDICINE

## 2019-11-13 PROCEDURE — 85025 COMPLETE CBC W/AUTO DIFF WBC: CPT | Performed by: INTERNAL MEDICINE

## 2019-11-13 PROCEDURE — 80053 COMPREHEN METABOLIC PANEL: CPT | Performed by: INTERNAL MEDICINE

## 2019-11-13 PROCEDURE — 25010000002 DOXORUBICIN PER 10 MG: Performed by: INTERNAL MEDICINE

## 2019-11-13 RX ADMIN — PREDNISONE 100 MG: 50 TABLET ORAL at 08:18

## 2019-11-13 RX ADMIN — POLYETHYLENE GLYCOL 3350 17 G: 17 POWDER, FOR SOLUTION ORAL at 21:03

## 2019-11-13 RX ADMIN — ALLOPURINOL 300 MG: 300 TABLET ORAL at 08:18

## 2019-11-13 RX ADMIN — MELATONIN TAB 5 MG 5 MG: 5 TAB at 21:05

## 2019-11-13 RX ADMIN — SODIUM CHLORIDE: 9 INJECTION, SOLUTION INTRAVENOUS at 17:51

## 2019-11-13 RX ADMIN — ONDANSETRON 16 MG: 2 INJECTION INTRAMUSCULAR; INTRAVENOUS at 17:32

## 2019-11-13 RX ADMIN — DOCUSATE SODIUM 100 MG: 100 CAPSULE, LIQUID FILLED ORAL at 21:03

## 2019-11-13 RX ADMIN — DOCUSATE SODIUM 100 MG: 100 CAPSULE, LIQUID FILLED ORAL at 08:18

## 2019-11-13 RX ADMIN — PANTOPRAZOLE SODIUM 40 MG: 40 TABLET, DELAYED RELEASE ORAL at 05:26

## 2019-11-13 RX ADMIN — ENOXAPARIN SODIUM 40 MG: 40 INJECTION SUBCUTANEOUS at 10:22

## 2019-11-13 RX ADMIN — SODIUM CHLORIDE, PRESERVATIVE FREE 10 ML: 5 INJECTION INTRAVENOUS at 21:06

## 2019-11-13 RX ADMIN — SODIUM CHLORIDE, PRESERVATIVE FREE 10 ML: 5 INJECTION INTRAVENOUS at 08:21

## 2019-11-13 RX ADMIN — SODIUM CHLORIDE 75 ML/HR: 9 INJECTION, SOLUTION INTRAVENOUS at 21:06

## 2019-11-13 NOTE — PROGRESS NOTES
Continued Stay Note  [facility]     Patient Name: Nell Montez  MRN: [id number]  Today's Date: 11/13/2019    Admit Date: 11/11/2019    Discharge Plan     Row Name 11/13/19 1006       Plan    Plan  Home with family    Patient/Family in Agreement with Plan  yes    Plan Comments  Pt. plans to dc to home with dtr. and friend to help. No dc needs voiced. Will cont. to follow and update.     Final Discharge Disposition Code  01 - home or self-care        Discharge Codes    No documentation.       Expected Discharge Date and Time     Expected Discharge Date Expected Discharge Time    Nov 15, 2019             Constance Goncalves RN

## 2019-11-13 NOTE — PLAN OF CARE
Problem: Patient Care Overview  Goal: Plan of Care Review  Outcome: Ongoing (interventions implemented as appropriate)   11/13/19 0330   Plan of Care Review   Progress no change   OTHER   Outcome Summary pt rested well, BM tonight, no c/o   Coping/Psychosocial   Plan of Care Reviewed With patient     Goal: Individualization and Mutuality  Outcome: Ongoing (interventions implemented as appropriate)    Goal: Discharge Needs Assessment  Outcome: Ongoing (interventions implemented as appropriate)    Goal: Interprofessional Rounds/Family Conf  Outcome: Ongoing (interventions implemented as appropriate)      Problem: Chemotherapy Effects (Adult)  Goal: Signs and Symptoms of Listed Potential Problems Will be Absent, Minimized or Managed (Chemotherapy Effects)  Outcome: Ongoing (interventions implemented as appropriate)

## 2019-11-13 NOTE — PROGRESS NOTES
[facility] Medicine Services  PROGRESS NOTE    Patient Name: Nell Montez  : 1950  MRN: [id number]    Date of Admission: 2019  Primary Care Physician: Rehan Fernandez MD    Subjective   Subjective     CC: Stage IV lymphoma    HPI: Currently tolerating IV chemotherapy.  Friend in room today.  Some constipation reported.  When discussing adding to her regimen she declined at this time.  She is in good spirits today.  No other complaints.    Review of Systems    Gen- No fevers, chills  CV- No chest pain, palpitations  Resp- No cough, dyspnea  GI- No N/V/D, abd pain    Objective   Objective     Vital Signs:   Temp:  [97.5 °F (36.4 °C)-97.9 °F (36.6 °C)] 97.5 °F (36.4 °C)  Heart Rate:  [64-78] 78  Resp:  [16-18] 18  BP: (102-134)/(51-78) 134/65        Physical Exam:    Constitutional: No acute distress, awake, alert  HENT: NCAT, dry tongue  Respiratory: Clear to auscultation bilaterally, respiratory effort normal   Cardiovascular: RRR, no murmurs, rubs, or gallops, palpable pedal pulses bilaterally  Gastrointestinal: Positive bowel sounds, soft, nontender, nondistended  Musculoskeletal: No bilateral ankle edema  Psychiatric: Appropriate affect, cooperative  Neurologic: Oriented x 3, strength symmetric in all extremities, Cranial Nerves grossly intact to confrontation, speech clear  Skin: No rashes      Results Reviewed:    Results from last 7 days   Lab Units 19  0716 19  1035 19  1018   WBC 10*3/mm3 7.21 8.03 5.10   HEMOGLOBIN g/dL 8.9* 10.9* 10.2*   HEMATOCRIT % 28.3* 33.0* 31.5*   PLATELETS 10*3/mm3 241 340 309     Results from last 7 days   Lab Units 19  0716 19  1035 19  1018   SODIUM mmol/L 142 143 139   POTASSIUM mmol/L 4.1 3.6 3.8   CHLORIDE mmol/L 110* 108* 105   CO2 mmol/L 24.0 25.0 25.0   BUN mg/dL 16 14 20   CREATININE mg/dL 0.58 0.58 0.54*   GLUCOSE mg/dL 82 111* 108*   CALCIUM mg/dL 8.4* 9.0 9.0   ALT (SGPT) U/L 7 13 11    AST (SGOT) U/L 13 18 17     Estimated Creatinine Clearance: 57.9 mL/min (by C-G formula based on SCr of 0.58 mg/dL).    Microbiology Results Abnormal     None          Imaging Results (Last 24 Hours)     ** No results found for the last 24 hours. **          Results for orders placed during the hospital encounter of 10/11/19   Adult Transthoracic Echo Complete W/ Cont if Necessary Per Protocol    Narrative · Left ventricular systolic function is normal.  · Calculated EF = 58.4%. Estimated EF appears to be in the range of 56 -   60%. Global Longitudinal LV strain = -16.7%.  · Left ventricular wall thickness is consistent with mild septal   asymmetric hypertrophy.  · Left ventricular diastolic dysfunction (grade I) consistent with   impaired relaxation.  · Right ventricular cavity is mild-to-moderately dilated.          I have reviewed the medications:  Scheduled Meds:  allopurinol 300 mg Oral Daily   cetirizine 10 mg Oral Nightly   docusate sodium 100 mg Oral BID   DOXOrubicin (ADRIAMYCIN), etoposide and vinCRIStine chemo IVPB (pediatric)  Intravenous Once   DOXOrubicin (ADRIAMYCIN), etoposide and vinCRIStine chemo IVPB (pediatric)  Intravenous Once   enoxaparin 40 mg Subcutaneous Q24H   pantoprazole 40 mg Oral Q AM   predniSONE 100 mg Oral Daily With Breakfast   sodium chloride 10 mL Intravenous Q12H     Continuous Infusions:  sodium chloride 75 mL/hr Last Rate: 75 mL/hr (11/12/19 1805)     PRN Meds:.calcium carbonate EX  •  diphenhydrAMINE  •  famotidine  •  hydrocortisone sodium succinate  •  LORazepam  •  melatonin  •  ondansetron  •  polyethylene glycol  •  prochlorperazine  •  sodium chloride      Assessment/Plan   Assessment / Plan     Active Hospital Problems    Diagnosis  POA   • Diffuse large B-cell lymphoma of lymph nodes of multiple regions (CMS/HCC) [C83.38]  Yes      Resolved Hospital Problems   No resolved problems to display.        Brief Hospital Course to date:  Nell Montez is a 69 y.o. female  who is a direct admission for lymphoma therapy    Diffuse large B-cell lymphoma (stage IV)  -Currently getting chemo per Dr. Kline  -Continue Zyloprim 300 mg daily  -IV doxorubicin  -Deltasone 100 mg daily  Hyperuricemia  -Chemo related side effect  -On allopurinol  Allergies  -Continue with cetirizine 10 mg oral daily  GERD  -Pantoprazole 40 mg daily    She will discharge home at the completion of her chemotherapy    DVT Prophylaxis: Enoxaparin 40 mg subcu daily    Disposition: I expect the patient to be discharged on Friday    CODE STATUS:   Code Status and Medical Interventions:   Ordered at: 11/11/19 1006     Level Of Support Discussed With:    Patient     Code Status:    CPR     Medical Interventions (Level of Support Prior to Arrest):    Full         Electronically signed by Navid Aguilar MD, 11/13/19, 6:19 PM.

## 2019-11-13 NOTE — PROGRESS NOTES
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  Received R-CHOP course #1 10/21/2019  Problem List:  Oncology/Hematology History    1. History of benign transvaginal hysterectomy 1/1/2016  2. History of reflux  3. History of transient global amnesia  4. IV dye allergy as well as allergy to barium enema  5. 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 with  Anterior mediastinal and subcarinal node, Right lower lobe nodule, Head of pancreas uptake, Paraesophageal node uptake,Liver metastases, Splenic hilar metastases, Bilateral adrenal gland uptake, Mesenteric/right retroperitoneal/right iliac uptake, Spine, right shoulder, right rib, sacrum, right hip metastases, Periumbilical subcutaneous nodule, Extensive periportal and periaortic lymphadenopathy with umbilical node excision showing 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 most likely per second opinion from [facility]    -9/26/2019 [facility] medical oncology consultation: Oncologic history as follows:  1/1/2016 vaginal hysterectomy for chronic cystic cervicitis and weakly proliferative endometrium with endometritis and a leiomyoma benign.  6/8/2016 colonoscopy benign  1/2019 apparently had CT screening of the chest that showed a couple of liver abnormalities (do not have those reports).  Unable to do with contrast and IV dye allergy.  Asymptomatic.  9/5/2019 CT of the abdomen without contrast shows at least 2 hepatic lesions, 4 cm in the right lobe of the liver and 2 cm in the left lobe of the liver.  9/11/2019 PET scan shows too numerous to count increased hypermetabolism in the anterior mediastinal, subcarinal, right lower lobe, head of the pancreas, esophagus, adjacent  "paraesophageal lymph nodes, liver, splenic hilum, left and right adrenal glands.  Also uptake in the mesentery, right retroperitoneum, right iliac chain, periumbilical subcutaneous fat, vaginal stump, and numerous bony lesions.  The bony lesions include the spine, right shoulder, right rib, sacrum, and right hip.  The 1.3 cm periumbilical subcutaneous nodule appears most amenable to biopsy.  Extensive periportal and periaortic lymphadenopathy with evidence of peritoneal metastasis for which PET CT was recommended.  9/18/2019 CT-guided fine-needle aspirate of nodule within the anterior abdominal wall \"atypical\" but unable to further typify.  9/26/2019 Dr. Kline initial visit.  Reviewed PET with patient and worrisome but nondiagnostic fine-needle aspirate.  Going to get sufficient tissue not only for diagnosis but for molecular testing that could include Biotheranostics to distinguish tissue type and 15 slides for Strata testing.  We will get our navigator on board as well.  She is having some nighttime sweats without fever though she does not describe them as bed drenching but this is new for her in the last month.  The range of possibilities here are broad and include lymphoma as well as a multiplicity of carcinomas but I need more than \"atypical\" cells on a fine-needle aspirate to ultimately decide our course.  We did talk about the likelihood that what ever we are up against it is unlikely to be curative but we will wait out her risk benefit ratio once we know the pathology.  Though they may ultimately not be helpful, while we are getting tissue I will go ahead and send off a barrage of tumor markers that might help guide us to the primary.    -10/7/2019 medical oncology office visit: Preliminary path report shows T-cell rich B-cell non-Hodgkin's lymphoma.  While awaiting final pathology we will get her port and echocardiogram and plan on treating her with Rituxan CHOP with Neulasta device for her stage IV " high-grade lymphoma.  She has a high risk IPI score.  We will get her LDH and uric acid today.  Risks including renal dysfunction, nausea, neuropathy, pancytopenia, cardiac dysfunction, infusion reactions, etc. were discussed in detail in addition to additional side effects as standard per all these medicines.  She knows that if this is the T-cell rich B-cell lymphoma that we treat this like a diffuse large B-cell lymphoma and that there is a possibility but not a guarantee of cure and it is not the majority in this setting but that plan and the statistics may change if the final pathology changes.    -10/8/2019 hepatitis B panel: Negative hepatitis B core antibody, negative hepatitis B surface antigen, reactive hepatitis B surface antibody.  Will need to monitor for any elevation of liver function studies during therapy.    -10/15/2019: Communication from Dr. Damian from [place] is that the final decision is that this is T-cell rich/histiocyte rich diffuse large B cell lymphoma.  Plan to proceed with R-CHOP with cooling cap.     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  Ejection fraction 58%.  Has cooling cap Education.  There is a international shortage of vincristine.  She will get vincristine with R-CHOP course #1 today but I am hesitant to just drop vincristine from all of her subsequent regimens but we will not have any to give her.  I plan to switch her regimen to EPOCHR but have removed the vincristine from the plan.  This will make her cooling cap a moot point since this is an 4-day infusional chemotherapy.  I will admit her on November 11 through hospitalists and plan for Neulasta shot as an outpatient on November 16.        Liver metastases (CMS/HCC)    9/26/2019 Initial Diagnosis     Liver metastases (CMS/HCC)           Diffuse  "large B-cell lymphoma of lymph nodes of multiple regions (CMS/HCC)    9/26/2019 Initial Diagnosis     Diffuse large B-cell lymphoma of lymph nodes of multiple regions (CMS/HCC)            HISTORY OF PRESENT ILLNESS:  Patient tolerating chemotherapy at this time.  No issues or concerns.  Slept well last night after taking melatonin.        Past Medical History:   Diagnosis Date   • Abnormal Pap smear of vagina     \"Abnormal pap smear\"   • Anxiety    • Cervical dysplasia    • Cystocele    • GERD (gastroesophageal reflux disease)     INTERMITTENT- NOT MEDICATION   • Migraine headache    • Osteopenia    • Transient global amnesia     fall 2015     Past Surgical History:   Procedure Laterality Date   • BREAST CYST ASPIRATION     • BREAST SURGERY      biopsy x 3   • CERVICAL BIOPSY      PT UNAWARE OF CERVIX BIOPSY   • IMPACTED THIRD MOLAR REMOVAL      wisdom teeth extracted in which two partially impacted   • OTHER SURGICAL HISTORY  10/02/2019    surgical bx of abd wall tumor   • TONSILLECTOMY     • VAGINAL HYSTERECTOMY      with anterior repair   • VAGINAL HYSTERECTOMY W/ ANTERIOR AND POSTERIOR VAGINAL REPAIR N/A 11/15/2016    Procedure: VAGINAL HYSTERECTOMY WITH ANTERIOR VAGINAL REPAIR;  Surgeon: Henry Pitt MD;  Location: [facility];  Service:        Allergies   Allergen Reactions   • Betadine [Povidone Iodine]    • Barium-Containing Compounds Unknown (See Comments)     unknown   • Codeine    • Contrast Dye Unknown (See Comments)     Unknown   • E-Mycin [Erythromycin]    • Epinephrine    • Iodine    • Other      MSG, Rema, Preservatives in eye ointment   • Procaine Other (See Comments)     DENTAL ANASTHESIA CAUSED HEART RACING- YRS AGO\"   • Sudafed [Pseudoephedrine Hcl]        Family History and Social History reviewed and changed as necessary      REVIEW OF SYSTEM:   Review of Systems   Constitutional: Negative for appetite change, chills, diaphoresis, fatigue, fever and unexpected weight change.   HENT:   " Negative for mouth sores, sore throat and trouble swallowing.    Eyes: Negative for icterus.   Respiratory: Negative for cough, hemoptysis and shortness of breath.    Cardiovascular: Negative for chest pain, leg swelling and palpitations.   Gastrointestinal: Negative for abdominal distention, abdominal pain, blood in stool, constipation, diarrhea, nausea and vomiting.   Endocrine: Negative for hot flashes.   Genitourinary: Negative for bladder incontinence, difficulty urinating, dysuria, frequency and hematuria.    Musculoskeletal: Negative for gait problem, neck pain and neck stiffness.   Skin: Negative for rash.   Neurological: Negative for dizziness, gait problem, headaches, light-headedness and numbness.   Hematological: Negative for adenopathy. Does not bruise/bleed easily.   Psychiatric/Behavioral: Negative for depression. The patient is not nervous/anxious.    All other systems reviewed and are negative.       PHYSICAL EXAM    Vitals:    11/12/19 2300 11/13/19 0300 11/13/19 0744 11/13/19 1106   BP: 126/78 102/58 122/60 130/62   BP Location:       Patient Position:       Pulse: 75 68 64 67   Resp: 18 18 16 16   Temp: 97.8 °F (36.6 °C) 97.6 °F (36.4 °C) 97.9 °F (36.6 °C) 97.5 °F (36.4 °C)   TempSrc:       SpO2: 99% 93% 96% 95%   Weight:       Height:         Constitutional: Appears well-developed and well-nourished. No distress.   ECOG: (1) Restricted in physically strenuous activity, ambulatory and able to do work of light nature  HENT:   Head: Normocephalic.   Mouth/Throat: Oropharynx is clear and moist.   Eyes: Conjunctivae are normal.  No scleral icterus.   Neck: Neck supple. No JVD present. No thyromegaly present.   Cardiovascular: Normal rate, regular rhythm and normal heart sounds.    Pulmonary/Chest: Breath sounds normal. No respiratory distress.   Abdominal: Soft. Exhibits no distension and no mass. There is no hepatosplenomegaly. There is no tenderness. There is no rebound and no guarding.    Musculoskeletal:Exhibits no edema, tenderness or deformity.   Neurological: Alert and oriented to person, place, and time. Exhibits normal muscle tone.   Skin: No ecchymosis, no petechiae and no rash noted. Not diaphoretic. No cyanosis. Nails show no clubbing. Port-A-Cath without redness.  Psychiatric: Normal mood and affect.   Vitals reviewed.      Lab Results   Component Value Date    HGB 8.9 (L) 11/13/2019    HCT 28.3 (L) 11/13/2019    MCV 97.3 (H) 11/13/2019     11/13/2019    WBC 7.21 11/13/2019    NEUTROABS 5.12 11/13/2019    LYMPHSABS 1.44 11/13/2019    MONOSABS 0.57 11/13/2019    EOSABS 0.01 11/13/2019    BASOSABS 0.03 11/13/2019       Lab Results   Component Value Date    GLUCOSE 82 11/13/2019    BUN 16 11/13/2019    CREATININE 0.58 11/13/2019     11/13/2019    K 4.1 11/13/2019     (H) 11/13/2019    CO2 24.0 11/13/2019    CALCIUM 8.4 (L) 11/13/2019    PROTEINTOT 4.9 (L) 11/13/2019    ALBUMIN 3.10 (L) 11/13/2019    BILITOT 0.2 11/13/2019    ALKPHOS 57 11/13/2019    AST 13 11/13/2019    ALT 7 11/13/2019             ASSESSMENT & PLAN:    1. Stage IV diffuse large B cell T-cell rich histiocyte rich lymphoma: Tolerated Rituxan CHOP course #1.  Due to lack of vincristine, we are switching from R-CHOP to etoposide, Rituxan, Adriamycin, prednisone, with no vincristine and this is an inpatient regimen.  She is tolerating chemotherapy at this time.  Continue to monitor daily labs.  Plan for discharge home after completion of chemotherapy.  She will return on 11/18/2019 at 0830 for Neulasta injection, appointment scheduled with outpatient infusion.  She will need a prescription from the hospitalist for ongoing allopurinol 100 mg daily upon discharge for a month with 1 refill.  We will arrange admission for cycle # 3 on 12/2/2019.      Selina Wright, COLLEEN    11/11/2019

## 2019-11-14 PROBLEM — E79.0 HYPERURICEMIA: Status: ACTIVE | Noted: 2019-11-14

## 2019-11-14 LAB
ALBUMIN SERPL-MCNC: 3.1 G/DL (ref 3.5–5.2)
ALBUMIN/GLOB SERPL: 1.7 G/DL
ALP SERPL-CCNC: 57 U/L (ref 39–117)
ALT SERPL W P-5'-P-CCNC: 10 U/L (ref 1–33)
ANION GAP SERPL CALCULATED.3IONS-SCNC: 6 MMOL/L (ref 5–15)
AST SERPL-CCNC: 13 U/L (ref 1–32)
BASOPHILS # BLD AUTO: 0.02 10*3/MM3 (ref 0–0.2)
BASOPHILS NFR BLD AUTO: 0.4 % (ref 0–1.5)
BILIRUB SERPL-MCNC: 0.2 MG/DL (ref 0.2–1.2)
BUN BLD-MCNC: 18 MG/DL (ref 8–23)
BUN/CREAT SERPL: 32.1 (ref 7–25)
CALCIUM SPEC-SCNC: 8.5 MG/DL (ref 8.6–10.5)
CHLORIDE SERPL-SCNC: 109 MMOL/L (ref 98–107)
CO2 SERPL-SCNC: 27 MMOL/L (ref 22–29)
CREAT BLD-MCNC: 0.56 MG/DL (ref 0.57–1)
DEPRECATED RDW RBC AUTO: 48.6 FL (ref 37–54)
EOSINOPHIL # BLD AUTO: 0 10*3/MM3 (ref 0–0.4)
EOSINOPHIL NFR BLD AUTO: 0 % (ref 0.3–6.2)
ERYTHROCYTE [DISTWIDTH] IN BLOOD BY AUTOMATED COUNT: 14.1 % (ref 12.3–15.4)
GFR SERPL CREATININE-BSD FRML MDRD: 107 ML/MIN/1.73
GLOBULIN UR ELPH-MCNC: 1.8 GM/DL
GLUCOSE BLD-MCNC: 97 MG/DL (ref 65–99)
HCT VFR BLD AUTO: 27.4 % (ref 34–46.6)
HGB BLD-MCNC: 8.9 G/DL (ref 12–15.9)
IMM GRANULOCYTES # BLD AUTO: 0.02 10*3/MM3 (ref 0–0.05)
IMM GRANULOCYTES NFR BLD AUTO: 0.4 % (ref 0–0.5)
LDH SERPL-CCNC: 141 U/L (ref 135–214)
LYMPHOCYTES # BLD AUTO: 1.33 10*3/MM3 (ref 0.7–3.1)
LYMPHOCYTES NFR BLD AUTO: 25 % (ref 19.6–45.3)
MAGNESIUM SERPL-MCNC: 2.1 MG/DL (ref 1.6–2.4)
MCH RBC QN AUTO: 31.1 PG (ref 26.6–33)
MCHC RBC AUTO-ENTMCNC: 32.5 G/DL (ref 31.5–35.7)
MCV RBC AUTO: 95.8 FL (ref 79–97)
MONOCYTES # BLD AUTO: 0.46 10*3/MM3 (ref 0.1–0.9)
MONOCYTES NFR BLD AUTO: 8.7 % (ref 5–12)
NEUTROPHILS # BLD AUTO: 3.48 10*3/MM3 (ref 1.7–7)
NEUTROPHILS NFR BLD AUTO: 65.5 % (ref 42.7–76)
NRBC BLD AUTO-RTO: 0 /100 WBC (ref 0–0.2)
PHOSPHATE SERPL-MCNC: 2.4 MG/DL (ref 2.5–4.5)
PLATELET # BLD AUTO: 222 10*3/MM3 (ref 140–450)
PMV BLD AUTO: 10.1 FL (ref 6–12)
POTASSIUM BLD-SCNC: 4.3 MMOL/L (ref 3.5–5.2)
PROT SERPL-MCNC: 4.9 G/DL (ref 6–8.5)
RBC # BLD AUTO: 2.86 10*6/MM3 (ref 3.77–5.28)
SODIUM BLD-SCNC: 142 MMOL/L (ref 136–145)
URATE SERPL-MCNC: 2.5 MG/DL (ref 2.4–5.7)
WBC NRBC COR # BLD: 5.31 10*3/MM3 (ref 3.4–10.8)

## 2019-11-14 PROCEDURE — 25010000002 ETOPOSIDE 500 MG/25ML SOLUTION 25 ML VIAL: Performed by: INTERNAL MEDICINE

## 2019-11-14 PROCEDURE — 83615 LACTATE (LD) (LDH) ENZYME: CPT | Performed by: INTERNAL MEDICINE

## 2019-11-14 PROCEDURE — 99231 SBSQ HOSP IP/OBS SF/LOW 25: CPT | Performed by: INTERNAL MEDICINE

## 2019-11-14 PROCEDURE — 84100 ASSAY OF PHOSPHORUS: CPT | Performed by: INTERNAL MEDICINE

## 2019-11-14 PROCEDURE — 63710000001 PREDNISONE PER 5 MG: Performed by: INTERNAL MEDICINE

## 2019-11-14 PROCEDURE — 80053 COMPREHEN METABOLIC PANEL: CPT | Performed by: INTERNAL MEDICINE

## 2019-11-14 PROCEDURE — 84550 ASSAY OF BLOOD/URIC ACID: CPT | Performed by: INTERNAL MEDICINE

## 2019-11-14 PROCEDURE — 25010000002 ONDANSETRON PER 1 MG: Performed by: INTERNAL MEDICINE

## 2019-11-14 PROCEDURE — 85025 COMPLETE CBC W/AUTO DIFF WBC: CPT | Performed by: INTERNAL MEDICINE

## 2019-11-14 PROCEDURE — 25010000002 DOXORUBICIN PER 10 MG: Performed by: INTERNAL MEDICINE

## 2019-11-14 PROCEDURE — 99232 SBSQ HOSP IP/OBS MODERATE 35: CPT | Performed by: NURSE PRACTITIONER

## 2019-11-14 PROCEDURE — 25010000002 ENOXAPARIN PER 10 MG: Performed by: HOSPITALIST

## 2019-11-14 PROCEDURE — 83735 ASSAY OF MAGNESIUM: CPT | Performed by: INTERNAL MEDICINE

## 2019-11-14 RX ORDER — ALLOPURINOL 300 MG/1
300 TABLET ORAL DAILY
Qty: 30 TABLET | Refills: 1 | Status: SHIPPED | OUTPATIENT
Start: 2019-11-14 | End: 2019-11-15 | Stop reason: SDUPTHER

## 2019-11-14 RX ADMIN — DOCUSATE SODIUM 100 MG: 100 CAPSULE, LIQUID FILLED ORAL at 08:25

## 2019-11-14 RX ADMIN — PREDNISONE 100 MG: 50 TABLET ORAL at 08:25

## 2019-11-14 RX ADMIN — SODIUM CHLORIDE, PRESERVATIVE FREE 10 ML: 5 INJECTION INTRAVENOUS at 21:15

## 2019-11-14 RX ADMIN — POLYETHYLENE GLYCOL 3350 17 G: 17 POWDER, FOR SOLUTION ORAL at 21:13

## 2019-11-14 RX ADMIN — DOCUSATE SODIUM 100 MG: 100 CAPSULE, LIQUID FILLED ORAL at 21:13

## 2019-11-14 RX ADMIN — ONDANSETRON 16 MG: 2 INJECTION INTRAMUSCULAR; INTRAVENOUS at 16:20

## 2019-11-14 RX ADMIN — ENOXAPARIN SODIUM 40 MG: 40 INJECTION SUBCUTANEOUS at 11:52

## 2019-11-14 RX ADMIN — ALLOPURINOL 300 MG: 300 TABLET ORAL at 08:25

## 2019-11-14 RX ADMIN — SODIUM CHLORIDE: 9 INJECTION, SOLUTION INTRAVENOUS at 17:08

## 2019-11-14 RX ADMIN — PANTOPRAZOLE SODIUM 40 MG: 40 TABLET, DELAYED RELEASE ORAL at 05:22

## 2019-11-14 RX ADMIN — MELATONIN TAB 5 MG 5 MG: 5 TAB at 21:13

## 2019-11-14 NOTE — PLAN OF CARE
Problem: Patient Care Overview  Goal: Plan of Care Review  Outcome: Ongoing (interventions implemented as appropriate)   11/14/19 0431   Plan of Care Review   Progress no change   OTHER   Outcome Summary VSS, pt rested on and off between care, chemo conts to infuse, friend at bedside, no c/o pain or discomfort, friend at bedside, will cont to monitor   Coping/Psychosocial   Plan of Care Reviewed With patient;friend     Goal: Individualization and Mutuality  Outcome: Ongoing (interventions implemented as appropriate)    Goal: Discharge Needs Assessment  Outcome: Ongoing (interventions implemented as appropriate)    Goal: Interprofessional Rounds/Family Conf  Outcome: Ongoing (interventions implemented as appropriate)      Problem: Chemotherapy Effects (Adult)  Goal: Signs and Symptoms of Listed Potential Problems Will be Absent, Minimized or Managed (Chemotherapy Effects)  Outcome: Ongoing (interventions implemented as appropriate)

## 2019-11-14 NOTE — PLAN OF CARE
Problem: Patient Care Overview  Goal: Plan of Care Review  Outcome: Ongoing (interventions implemented as appropriate)   11/14/19 2649   Plan of Care Review   Progress improving   OTHER   Outcome Summary VSS, tolerating PO diet, ambulating ad caprice, no c/o pain, urine output adequate. Chemotherapy continued.    Coping/Psychosocial   Plan of Care Reviewed With patient     Goal: Individualization and Mutuality  Outcome: Ongoing (interventions implemented as appropriate)    Goal: Discharge Needs Assessment  Outcome: Ongoing (interventions implemented as appropriate)    Goal: Interprofessional Rounds/Family Conf  Outcome: Ongoing (interventions implemented as appropriate)      Problem: Chemotherapy Effects (Adult)  Goal: Signs and Symptoms of Listed Potential Problems Will be Absent, Minimized or Managed (Chemotherapy Effects)  Outcome: Ongoing (interventions implemented as appropriate)

## 2019-11-14 NOTE — PROGRESS NOTES
"HEMATOLOGY/ONCOLOGY PROGRESS NOTE    Subjective      CC: Diffuse large B cell non-Hodgkin lymphoma    SUBJECTIVE: Tolerating etoposide prednisone Cytoxan and Adriamycin without vincristine due to shortage of vincristine.        Past Medical History, Past Surgical History, Social History, Family History have been reviewed and are without significant changes except as mentioned.      Medications:  The current medication list was reviewed in the EMR    ALLERGIES:   Allergies   Allergen Reactions   • Betadine [Povidone Iodine]    • Barium-Containing Compounds Unknown (See Comments)     unknown   • Codeine    • Contrast Dye Unknown (See Comments)     Unknown   • E-Mycin [Erythromycin]    • Epinephrine    • Iodine    • Other      MSG, Rema, Preservatives in eye ointment   • Procaine Other (See Comments)     DENTAL ANASTHESIA CAUSED HEART RACING- YRS AGO\"   • Sudafed [Pseudoephedrine Hcl]        ROS:  A comprehensive 14 point review of systems was performed and was negative except as mentioned.      Objective      Vitals:    11/14/19 0454 11/14/19 0700 11/14/19 1100 11/14/19 1500   BP: 121/57 129/70 95/51 135/75   Pulse: 69 53 74 80   Resp: 18 18 18 18   Temp: 98.4 °F (36.9 °C) 98.1 °F (36.7 °C) 98.5 °F (36.9 °C) 98.4 °F (36.9 °C)   TempSrc: Oral Oral Oral Oral   SpO2: 95% 98% 94% 96%   Weight:       Height:            ECOG: (0) Fully active, able to carry on all predisease performance without restriction    General: well appearing, in no acute distress  HEENT: sclerae anicteric, oropharynx clear  Lymphatics: no cervical, supraclavicular, inguinal, or axillary adenopathy  Cardiovascular: regular rate and rhythm, no murmurs  Lungs: clear to auscultation bilaterally  Abdomen: soft, nontender, nondistended.  No palpable organomegaly  Extremities: no lower extremity edema  Skin: no rashes, lesions, bruising, or petechiae  Neuro: Alert and oriented x 3. Moves all extremities.    RECENT LABS:    Results from last 7 days   Lab " Units 11/14/19  0441 11/13/19  0716 11/12/19  1035   WBC 10*3/mm3 5.31 7.21 8.03   HEMOGLOBIN g/dL 8.9* 8.9* 10.9*   PLATELETS 10*3/mm3 222 241 340     Results from last 7 days   Lab Units 11/14/19  0441 11/13/19  0716 11/12/19  1035   SODIUM mmol/L 142 142 143   POTASSIUM mmol/L 4.3 4.1 3.6   CO2 mmol/L 27.0 24.0 25.0   BUN mg/dL 18 16 14   CREATININE mg/dL 0.56* 0.58 0.58   GLUCOSE mg/dL 97 82 111*     Results from last 7 days   Lab Units 11/14/19 0441 11/13/19  0716 11/12/19  1035   AST (SGOT) U/L 13 13 18   ALT (SGPT) U/L 10 7 13   BILIRUBIN mg/dL 0.2 0.2 0.3   ALK PHOS U/L 57 57 77         No radiology results for the last 7 days            Assessment   ASSESSMENT & PLAN:    1. Diffuse large B-cell lymphoma: She will complete her chemotherapy tomorrow and get her Neulasta shot 830 Monday morning.  Will readmit 12-19 for course #2 of this regimen and course #3 of chemotherapy.  Tolerating chemotherapy well.  Some anemia but not symptomatic.  Will need allopurinol prescription for discharge 300 mg daily.  Ask hospitalist to provide please.                  Ranulfo Kline MD    11/14/2019

## 2019-11-14 NOTE — PROGRESS NOTES
Clinical Nutrition   Reason For Visit: Follow-up protocol    Patient Name: Nell Montez  YOB: 1950  MRN: [id number]  Date of Encounter: 11/14/19 9:42 AM  Admission date: 11/11/2019    Nutrition Assessment     Admission Diagnosis  Stage IV diffuse large B cell lymphoma  Chemotherapy    Hospital Problem List   Hyperuricemia, chemo related  Positive hepatitis B    Applicable PMH/PSH  GERD  Anxiety  Migraines  Cystocele  Osteopenia   Constipation   Cervical dysplasia   Transient global amnesia     Reported/Observed/Food/Nutrition Related History     Anthropometrics   Height: 62 inches  Weight: 148 lb (11/11), no method documented  BMI: 27.06 kg/m2   BMI classification: Overweight: 25.0-29.9kg/m2      (Per previous RDN documentation 11/12):  UBW: 178 lb per pt  Weight change: Pt reports that she weighed 178 lb at the start of the year. States that she has lost 30 lb over the past 11 months. Reports that weight loss was unintentional and intentional. States that she wanted to lose weight and started eating better, but that she also was eating less due to cancer. Pt reports that she is happy with her current weight of 148 lb and does not want to lose anymore weight at this time.   Per outpatient oncology RD note (10/21/19):  Weight: 149 lbs.  Usual Body Weight: ~174 lbs.   Weight has decreased ~25 pounds over last ~4 months; 14.4% weight loss     Labs reviewed   Labs reviewed: Yes  Hypercalcemia   Hypophosphatemia   Results from last 7 days   Lab Units 11/14/19  0441 11/13/19  0716 11/12/19  1035   SODIUM mmol/L 142 142 143   POTASSIUM mmol/L 4.3 4.1 3.6   CHLORIDE mmol/L 109* 110* 108*   CO2 mmol/L 27.0 24.0 25.0   BUN mg/dL 18 16 14   CREATININE mg/dL 0.56* 0.58 0.58   GLUCOSE mg/dL 97 82 111*   CALCIUM mg/dL 8.5* 8.4* 9.0   PHOSPHORUS mg/dL 2.4* 2.7 2.2*   MAGNESIUM mg/dL 2.1 2.0 2.0     Results from last 7 days   Lab Units 11/14/19  0441 11/13/19  0716 11/12/19  1035   WBC 10*3/mm3 5.31 7.21 8.03    ALBUMIN g/dL 3.10* 3.10* 3.70     Lab Results   Lab Value Date/Time    HGBA1C 5.20 11/14/2016 1129     Medications reviewed   Medications reviewed: Yes  Pertinent: colace, protonix, prednisone  GTT: IVF @ 75 mL/hr  PRN: ativan, tums, pepcid, zofran      Current Nutrition Prescription   PO: Diet Regular; 2% milk TID     Oral Nutrition Supplement: Van/SB Boost Plus TID    Evaluation of Received Nutrient/Fluid Intake: 75% of 4 meals     Nutrition Diagnosis   11/12; updated 11/14  Problem Inadequate oral intake   Etiology Clinical condition   Signs/Symptoms 75% of 4 meals   Status: resolved     11/12  Problem Unintended weight loss   Etiology Decreased PO intake   Signs/Symptoms Pt reports unintentional and intentional wt loss of 30 lb over the past 11 months.    Status: ongoing     Intervention   Intervention: Follow treatment progress, Care plan reviewed     Goal:   General: Nutrition support treatment  PO: Maintain intake, Continue positive trend    Monitoring/Evaluation:       Monitoring/Evaluation: Per protocol, PO intake, Supplement intake, Pertinent labs, Weight, Symptoms     Will Continue to follow per protocol  Madeline Brenner RD     Time Spent: 20 minutes

## 2019-11-14 NOTE — PROGRESS NOTES
[facility] Medicine Services  PROGRESS NOTE    Patient Name: Nell Montez  : 1950  MRN: [id number]    Date of Admission: 2019  Primary Care Physician: Rehan Fernandez MD    Subjective   Subjective     CC: Stage IV lymphoma    HPI: Chemo infusing and reports she is tolerating.      Review of Systems    Gen- No fevers, chills  CV- No chest pain, palpitations  Resp- No cough, dyspnea  GI- No N/V/D, abd pain    Objective   Objective     Vital Signs:   Temp:  [97.5 °F (36.4 °C)-98.7 °F (37.1 °C)] 98.1 °F (36.7 °C)  Heart Rate:  [53-78] 53  Resp:  [18] 18  BP: (120-134)/(57-70) 129/70        Physical Exam:  Constitutional: No acute distress, awake, alert  HENT: NCAT, mucous membranes moist  Respiratory: Clear to auscultation bilaterally, respiratory effort normal   Cardiovascular: RRR, no murmurs, rubs, or gallops, palpable pedal pulses bilaterally  Gastrointestinal: Positive bowel sounds, soft, nontender, nondistended  Musculoskeletal: Trace bilateral ankle edema  Psychiatric: Appropriate affect, cooperative  Neurologic: Oriented x 3, YOUNG, speech clear  Skin: No rashes      Results Reviewed:    Results from last 7 days   Lab Units 19  0716 19  1035   WBC 10*3/mm3 5.31 7.21 8.03   HEMOGLOBIN g/dL 8.9* 8.9* 10.9*   HEMATOCRIT % 27.4* 28.3* 33.0*   PLATELETS 10*3/mm3 222 241 340     Results from last 7 days   Lab Units 19  04419  0716 19  1035   SODIUM mmol/L 142 142 143   POTASSIUM mmol/L 4.3 4.1 3.6   CHLORIDE mmol/L 109* 110* 108*   CO2 mmol/L 27.0 24.0 25.0   BUN mg/dL 18 16 14   CREATININE mg/dL 0.56* 0.58 0.58   GLUCOSE mg/dL 97 82 111*   CALCIUM mg/dL 8.5* 8.4* 9.0   ALT (SGPT) U/L 10 7 13   AST (SGOT) U/L 13 13 18     Estimated Creatinine Clearance: 57.9 mL/min (A) (by C-G formula based on SCr of 0.56 mg/dL (L)).    Microbiology Results Abnormal     None          Imaging Results (Last 24 Hours)     ** No results found for  the last 24 hours. **          Results for orders placed during the hospital encounter of 10/11/19   Adult Transthoracic Echo Complete W/ Cont if Necessary Per Protocol    Narrative · Left ventricular systolic function is normal.  · Calculated EF = 58.4%. Estimated EF appears to be in the range of 56 -   60%. Global Longitudinal LV strain = -16.7%.  · Left ventricular wall thickness is consistent with mild septal   asymmetric hypertrophy.  · Left ventricular diastolic dysfunction (grade I) consistent with   impaired relaxation.  · Right ventricular cavity is mild-to-moderately dilated.          I have reviewed the medications:  Scheduled Meds:    allopurinol 300 mg Oral Daily   cetirizine 10 mg Oral Nightly   docusate sodium 100 mg Oral BID   DOXOrubicin (ADRIAMYCIN), etoposide and vinCRIStine chemo IVPB (pediatric)  Intravenous Once   DOXOrubicin (ADRIAMYCIN), etoposide and vinCRIStine chemo IVPB (pediatric)  Intravenous Once   enoxaparin 40 mg Subcutaneous Q24H   ondansetron (ZOFRAN) IVPB 50 mL 16 mg Intravenous Once   pantoprazole 40 mg Oral Q AM   predniSONE 100 mg Oral Daily With Breakfast   sodium chloride 10 mL Intravenous Q12H     Continuous Infusions:    sodium chloride 75 mL/hr Last Rate: 75 mL/hr (11/13/19 2106)     PRN Meds:.calcium carbonate EX  •  diphenhydrAMINE  •  famotidine  •  hydrocortisone sodium succinate  •  LORazepam  •  melatonin  •  ondansetron  •  polyethylene glycol  •  prochlorperazine  •  sodium chloride      Assessment/Plan   Assessment / Plan     Active Hospital Problems    Diagnosis  POA   • **Diffuse large B-cell lymphoma of lymph nodes of multiple regions (CMS/HCC) [C83.38]  Yes   • Hyperuricemia [E79.0]  Yes      Resolved Hospital Problems   No resolved problems to display.        Brief Hospital Course to date:  Nell Montez is a 69 y.o. female who is a direct admission for lymphoma therapy    Diffuse large B-cell lymphoma (stage IV)  -Currently getting chemo per   Kline  -Due to lack of vincristine, switching from R-CHOP to etoposide, Rituxan, Adriamycin, prednisone, with no vincristine and this is an inpatient regimen  -oncology following    Hyperuricemia  -Chemo related side effect  -On allopurinol, script on chart for dc    Allergies  -Continue with cetirizine 10 mg oral daily    GERD  -Pantoprazole 40 mg daily    DVT Prophylaxis: Enoxaparin 40 mg SQ daily    Disposition: I expect the patient to be discharged on Friday after completion of her chemotherapy.  Nursing anticipates tomorrow night      CODE STATUS:   Code Status and Medical Interventions:   Ordered at: 11/11/19 1006     Level Of Support Discussed With:    Patient     Code Status:    CPR     Medical Interventions (Level of Support Prior to Arrest):    Full         Electronically signed by COLLEEN Pavon, 11/14/19, 12:10 PM.

## 2019-11-15 VITALS
SYSTOLIC BLOOD PRESSURE: 130 MMHG | WEIGHT: 148 LBS | DIASTOLIC BLOOD PRESSURE: 62 MMHG | TEMPERATURE: 97.8 F | HEIGHT: 62 IN | HEART RATE: 78 BPM | RESPIRATION RATE: 16 BRPM | OXYGEN SATURATION: 95 % | BODY MASS INDEX: 27.23 KG/M2

## 2019-11-15 LAB
ALBUMIN SERPL-MCNC: 3.1 G/DL (ref 3.5–5.2)
ALBUMIN/GLOB SERPL: 1.7 G/DL
ALP SERPL-CCNC: 54 U/L (ref 39–117)
ALT SERPL W P-5'-P-CCNC: 12 U/L (ref 1–33)
ANION GAP SERPL CALCULATED.3IONS-SCNC: 9 MMOL/L (ref 5–15)
AST SERPL-CCNC: 15 U/L (ref 1–32)
BASOPHILS # BLD AUTO: 0.03 10*3/MM3 (ref 0–0.2)
BASOPHILS NFR BLD AUTO: 0.6 % (ref 0–1.5)
BILIRUB SERPL-MCNC: 0.2 MG/DL (ref 0.2–1.2)
BUN BLD-MCNC: 16 MG/DL (ref 8–23)
BUN/CREAT SERPL: 29.6 (ref 7–25)
CALCIUM SPEC-SCNC: 8.4 MG/DL (ref 8.6–10.5)
CHLORIDE SERPL-SCNC: 108 MMOL/L (ref 98–107)
CO2 SERPL-SCNC: 25 MMOL/L (ref 22–29)
CREAT BLD-MCNC: 0.54 MG/DL (ref 0.57–1)
DEPRECATED RDW RBC AUTO: 48 FL (ref 37–54)
EOSINOPHIL # BLD AUTO: 0 10*3/MM3 (ref 0–0.4)
EOSINOPHIL NFR BLD AUTO: 0 % (ref 0.3–6.2)
ERYTHROCYTE [DISTWIDTH] IN BLOOD BY AUTOMATED COUNT: 14 % (ref 12.3–15.4)
GFR SERPL CREATININE-BSD FRML MDRD: 111 ML/MIN/1.73
GLOBULIN UR ELPH-MCNC: 1.8 GM/DL
GLUCOSE BLD-MCNC: 96 MG/DL (ref 65–99)
HCT VFR BLD AUTO: 27.2 % (ref 34–46.6)
HGB BLD-MCNC: 9 G/DL (ref 12–15.9)
IMM GRANULOCYTES # BLD AUTO: 0.03 10*3/MM3 (ref 0–0.05)
IMM GRANULOCYTES NFR BLD AUTO: 0.6 % (ref 0–0.5)
LDH SERPL-CCNC: 164 U/L (ref 135–214)
LYMPHOCYTES # BLD AUTO: 1.27 10*3/MM3 (ref 0.7–3.1)
LYMPHOCYTES NFR BLD AUTO: 26.5 % (ref 19.6–45.3)
MAGNESIUM SERPL-MCNC: 2.1 MG/DL (ref 1.6–2.4)
MCH RBC QN AUTO: 31.4 PG (ref 26.6–33)
MCHC RBC AUTO-ENTMCNC: 33.1 G/DL (ref 31.5–35.7)
MCV RBC AUTO: 94.8 FL (ref 79–97)
MONOCYTES # BLD AUTO: 0.29 10*3/MM3 (ref 0.1–0.9)
MONOCYTES NFR BLD AUTO: 6 % (ref 5–12)
NEUTROPHILS # BLD AUTO: 3.18 10*3/MM3 (ref 1.7–7)
NEUTROPHILS NFR BLD AUTO: 66.3 % (ref 42.7–76)
NRBC BLD AUTO-RTO: 0 /100 WBC (ref 0–0.2)
PHOSPHATE SERPL-MCNC: 2.8 MG/DL (ref 2.5–4.5)
PLATELET # BLD AUTO: 219 10*3/MM3 (ref 140–450)
PMV BLD AUTO: 10.1 FL (ref 6–12)
POTASSIUM BLD-SCNC: 3.9 MMOL/L (ref 3.5–5.2)
PROT SERPL-MCNC: 4.9 G/DL (ref 6–8.5)
RBC # BLD AUTO: 2.87 10*6/MM3 (ref 3.77–5.28)
SODIUM BLD-SCNC: 142 MMOL/L (ref 136–145)
URATE SERPL-MCNC: 2.5 MG/DL (ref 2.4–5.7)
WBC NRBC COR # BLD: 4.8 10*3/MM3 (ref 3.4–10.8)

## 2019-11-15 PROCEDURE — 85025 COMPLETE CBC W/AUTO DIFF WBC: CPT | Performed by: INTERNAL MEDICINE

## 2019-11-15 PROCEDURE — 99239 HOSP IP/OBS DSCHRG MGMT >30: CPT | Performed by: FAMILY MEDICINE

## 2019-11-15 PROCEDURE — 84550 ASSAY OF BLOOD/URIC ACID: CPT | Performed by: INTERNAL MEDICINE

## 2019-11-15 PROCEDURE — 84100 ASSAY OF PHOSPHORUS: CPT | Performed by: INTERNAL MEDICINE

## 2019-11-15 PROCEDURE — 25010000002 ONDANSETRON PER 1 MG: Performed by: INTERNAL MEDICINE

## 2019-11-15 PROCEDURE — 80053 COMPREHEN METABOLIC PANEL: CPT | Performed by: INTERNAL MEDICINE

## 2019-11-15 PROCEDURE — 83735 ASSAY OF MAGNESIUM: CPT | Performed by: INTERNAL MEDICINE

## 2019-11-15 PROCEDURE — 83615 LACTATE (LD) (LDH) ENZYME: CPT | Performed by: INTERNAL MEDICINE

## 2019-11-15 PROCEDURE — 63710000001 PREDNISONE PER 5 MG: Performed by: INTERNAL MEDICINE

## 2019-11-15 PROCEDURE — 25010000002 ENOXAPARIN PER 10 MG: Performed by: HOSPITALIST

## 2019-11-15 PROCEDURE — 25010000002 CYCLOPHOSPHAMIDE PER 100 MG: Performed by: INTERNAL MEDICINE

## 2019-11-15 RX ORDER — SODIUM CHLORIDE 0.9 % (FLUSH) 0.9 %
20 SYRINGE (ML) INJECTION AS NEEDED
Status: DISCONTINUED | OUTPATIENT
Start: 2019-11-15 | End: 2019-11-15 | Stop reason: HOSPADM

## 2019-11-15 RX ORDER — ALLOPURINOL 300 MG/1
300 TABLET ORAL DAILY
Qty: 30 TABLET | Refills: 1 | Status: SHIPPED | OUTPATIENT
Start: 2019-11-15 | End: 2020-01-20

## 2019-11-15 RX ORDER — PREDNISONE 50 MG/1
100 TABLET ORAL
Qty: 10 TABLET | Refills: 0 | Status: SHIPPED | OUTPATIENT
Start: 2019-11-16 | End: 2019-11-25 | Stop reason: HOSPADM

## 2019-11-15 RX ADMIN — PANTOPRAZOLE SODIUM 40 MG: 40 TABLET, DELAYED RELEASE ORAL at 05:53

## 2019-11-15 RX ADMIN — PREDNISONE 100 MG: 50 TABLET ORAL at 08:13

## 2019-11-15 RX ADMIN — CYCLOPHOSPHAMIDE 1270 MG: 1 INJECTION, POWDER, FOR SOLUTION INTRAVENOUS; ORAL at 16:35

## 2019-11-15 RX ADMIN — ONDANSETRON 16 MG: 2 INJECTION INTRAMUSCULAR; INTRAVENOUS at 16:00

## 2019-11-15 RX ADMIN — SODIUM CHLORIDE 75 ML/HR: 9 INJECTION, SOLUTION INTRAVENOUS at 09:58

## 2019-11-15 RX ADMIN — ENOXAPARIN SODIUM 40 MG: 40 INJECTION SUBCUTANEOUS at 11:59

## 2019-11-15 RX ADMIN — SODIUM CHLORIDE, PRESERVATIVE FREE 500 UNITS: 5 INJECTION INTRAVENOUS at 17:08

## 2019-11-15 RX ADMIN — SODIUM CHLORIDE, PRESERVATIVE FREE 10 ML: 5 INJECTION INTRAVENOUS at 08:14

## 2019-11-15 RX ADMIN — DOCUSATE SODIUM 100 MG: 100 CAPSULE, LIQUID FILLED ORAL at 08:13

## 2019-11-15 RX ADMIN — ALLOPURINOL 300 MG: 300 TABLET ORAL at 08:13

## 2019-11-15 NOTE — DISCHARGE SUMMARY
[facility] Medicine Services  DISCHARGE SUMMARY    Patient Name: Nell Montez  : 1950  MRN: [id number]    Date of Admission: 2019  9:03 AM  Date of Discharge:  11/15/19    Primary Care Physician: Rehan Fernandez MD      Hospital Course     Presenting Problem:   Lymphoma (CMS/HCC) [C85.90]  Diffuse large B-cell lymphoma of lymph nodes of multiple regions (CMS/HCC) [C83.38]    Active Hospital Problems    Diagnosis  POA   • **Diffuse large B-cell lymphoma of lymph nodes of multiple regions (CMS/HCC) [C83.38]  Yes   • Hyperuricemia [E79.0]  Yes      Resolved Hospital Problems   No resolved problems to display.          Hospital Course:  Nell Montez is a 69 y.o. female who was a planned direct admission for lymphoma chemotherapy.     Diffuse large B-cell lymphoma (stage IV)  -Follows with Dr. Kline  -Due to lack of vincristine, switched from R-CHOP to etoposide, Rituxan, Adriamycin, with 9 day course shaquille-infusion high dose prednisone  -Next planned chemo admission 19     Hyperuricemia  -Chemo related side effect  -On allopurinol, Rx'd for discharge          Discharge Follow Up Recommendations for labs/diagnostics:  Per Dr. Kline -->  Neulasta injection planned of for 19 at outpt infusion, next planned chemo admission on 19     Day of Discharge     HPI:   Feels well.  Denies N/V, weakness, rash, or other complaints.     Review of Systems  Gen- No fevers, chills  CV- No chest pain, palpitations  Resp- No cough, dyspnea  GI- No N/V/D, abd pain  Skin - No rash  Otherwise ROS is negative except as mentioned in the HPI.    Vital Signs:   Temp:  [97.4 °F (36.3 °C)-98.4 °F (36.9 °C)] 98.3 °F (36.8 °C)  Heart Rate:  [56-80] 56  Resp:  [16-18] 16  BP: (122-148)/(59-75) 148/71     Physical Exam:  Constitutional: No acute distress, awake, alert, nontoxic, normal body habitus  Respiratory: Clear to auscultation bilaterally, good effort, nonlabored  respirations   Cardiovascular: RRR, no murmur  Gastrointestinal: Positive bowel sounds, soft, nontender, nondistended  Musculoskeletal: No peripheral edema, normal muscle tone for age  Psychiatric: Appropriate affect, good insight and judgement, cooperative  Skin: No rashes, no petechiae    Pertinent  and/or Most Recent Results     Results from last 7 days   Lab Units 11/15/19  0433 11/14/19  0441 11/13/19  0716 11/12/19  1035 11/11/19  1018   WBC 10*3/mm3 4.80 5.31 7.21 8.03 5.10   HEMOGLOBIN g/dL 9.0* 8.9* 8.9* 10.9* 10.2*   HEMATOCRIT % 27.2* 27.4* 28.3* 33.0* 31.5*   PLATELETS 10*3/mm3 219 222 241 340 309   SODIUM mmol/L 142 142 142 143 139   POTASSIUM mmol/L 3.9 4.3 4.1 3.6 3.8   CHLORIDE mmol/L 108* 109* 110* 108* 105   CO2 mmol/L 25.0 27.0 24.0 25.0 25.0   BUN mg/dL 16 18 16 14 20   CREATININE mg/dL 0.54* 0.56* 0.58 0.58 0.54*   GLUCOSE mg/dL 96 97 82 111* 108*   CALCIUM mg/dL 8.4* 8.5* 8.4* 9.0 9.0     Results from last 7 days   Lab Units 11/15/19  0433 11/14/19  0441 11/13/19  0716 11/12/19  1035 11/11/19  1018   BILIRUBIN mg/dL 0.2 0.2 0.2 0.3 0.3   ALK PHOS U/L 54 57 57 77 77   ALT (SGPT) U/L 12 10 7 13 11   AST (SGOT) U/L 15 13 13 18 17           Invalid input(s): TG, LDLCALC, LDLREALC  Results from last 7 days   Lab Units 11/11/19  1018   TSH uIU/mL 1.980         Results for orders placed during the hospital encounter of 10/11/19   Adult Transthoracic Echo Complete W/ Cont if Necessary Per Protocol    Narrative · Left ventricular systolic function is normal.  · Calculated EF = 58.4%. Estimated EF appears to be in the range of 56 -   60%. Global Longitudinal LV strain = -16.7%.  · Left ventricular wall thickness is consistent with mild septal   asymmetric hypertrophy.  · Left ventricular diastolic dysfunction (grade I) consistent with   impaired relaxation.  · Right ventricular cavity is mild-to-moderately dilated.            Discharge Details        Discharge Medications      New Medications       "Instructions Start Date   predniSONE 50 MG tablet  Commonly known as:  DELTASONE   100 mg, Oral, Daily With Breakfast   Start Date:  11/16/2019        Changes to Medications      Instructions Start Date   docusate sodium 100 MG capsule  What changed:    · when to take this  · reasons to take this   100 mg, Oral, 2 Times Daily      lidocaine-prilocaine 2.5-2.5 % cream  Commonly known as:  EMLA  What changed:  how much to take   Topical, As Needed         Continue These Medications      Instructions Start Date   allopurinol 300 MG tablet  Commonly known as:  ZYLOPRIM   300 mg, Oral, Daily      estradiol 0.1 MG/GM vaginal cream  Commonly known as:  ESTRACE VAGINAL   2 g, Vaginal, Daily      loratadine 10 MG tablet  Commonly known as:  CLARITIN   10 mg, Oral, Daily      MULTIVITAMIN ADULT PO   Oral, Daily      ondansetron 8 MG tablet  Commonly known as:  ZOFRAN   8 mg, Oral, 3 Times Daily PRN         Stop These Medications    aspirin 81 MG EC tablet     fish oil 1000 MG capsule capsule     ramipril 2.5 MG capsule  Commonly known as:  ALTACE     Vitamin D 1000 units tablet            Allergies   Allergen Reactions   • Betadine [Povidone Iodine]    • Barium-Containing Compounds Unknown (See Comments)     unknown   • Codeine    • Contrast Dye Unknown (See Comments)     Unknown   • E-Mycin [Erythromycin]    • Epinephrine    • Iodine    • Other      MSG, Rema, Preservatives in eye ointment   • Procaine Other (See Comments)     DENTAL ANASTHESIA CAUSED HEART RACING- YRS AGO\"   • Sudafed [Pseudoephedrine Hcl]          Discharge Disposition:  Home or Self Care    Diet:  Hospital:  Diet Order   Procedures   • Diet Regular          CODE STATUS:    Code Status and Medical Interventions:   Ordered at: 11/11/19 1006     Level Of Support Discussed With:    Patient     Code Status:    CPR     Medical Interventions (Level of Support Prior to Arrest):    Full       Future Appointments   Date Time Provider Department Center "   11/18/2019  8:30 AM CHAIR 3 [facility]   12/2/2019  8:45 AM Ranulfo Kline MD [facility]       Additional Instructions for the Follow-ups that You Need to Schedule     Discharge Follow-up with Specified Provider: Please call Dr. Kline office and verify upcoming appt for patient, plan written in Dr. Painting's last progress note 11/14/19 states planning for readmission 12/19/19 for next round of treatment/chemo   As directed      To:  Please call Dr. Kline office and verify upcoming appt for patient, plan written in Dr. Painting's last progress note 11/14/19 states planning for readmission 12/19/19 for next round of treatment/chemo               Time Spent on Discharge:  40 minutes    Electronically signed by Selina Justice MD, 11/15/19, 11:27 AM.

## 2019-11-15 NOTE — PLAN OF CARE
Problem: Patient Care Overview  Goal: Plan of Care Review  Outcome: Ongoing (interventions implemented as appropriate)   11/14/19 1649 11/15/19 0301   Plan of Care Review   Progress --  improving   OTHER   Outcome Summary VSS, tolerating PO diet, ambulating ad caprice, no c/o pain, urine output adequate. Chemotherapy continued.  --    Coping/Psychosocial   Plan of Care Reviewed With --  patient;friend     Goal: Individualization and Mutuality  Outcome: Ongoing (interventions implemented as appropriate)    Goal: Discharge Needs Assessment  Outcome: Ongoing (interventions implemented as appropriate)    Goal: Interprofessional Rounds/Family Conf  Outcome: Ongoing (interventions implemented as appropriate)      Problem: Chemotherapy Effects (Adult)  Goal: Signs and Symptoms of Listed Potential Problems Will be Absent, Minimized or Managed (Chemotherapy Effects)  Outcome: Ongoing (interventions implemented as appropriate)

## 2019-11-16 ENCOUNTER — READMISSION MANAGEMENT (OUTPATIENT)
Dept: CALL CENTER | Facility: HOSPITAL | Age: 69
End: 2019-11-16

## 2019-11-16 NOTE — OUTREACH NOTE
Prep Survey      Responses   Facility patient discharged from?  [place]   Is patient eligible?  Yes   Discharge diagnosis  Diffuse large B-cell lymphoma of lymph nodes of multiple regions lymphoma chemotherapy   Does the patient have one of the following disease processes/diagnoses(primary or secondary)?  Other   Does the patient have Home health ordered?  No   Is there a DME ordered?  No   Prep survey completed?  Yes          Maureen Heller RN

## 2019-11-18 ENCOUNTER — HOSPITAL ENCOUNTER (OUTPATIENT)
Dept: ONCOLOGY | Facility: HOSPITAL | Age: 69
Setting detail: INFUSION SERIES
Discharge: HOME | End: 2019-11-18

## 2019-11-18 ENCOUNTER — TELEPHONE (OUTPATIENT)
Dept: ONCOLOGY | Facility: CLINIC | Age: 69
End: 2019-11-18

## 2019-11-18 VITALS
DIASTOLIC BLOOD PRESSURE: 75 MMHG | WEIGHT: 157 LBS | BODY MASS INDEX: 28.89 KG/M2 | HEIGHT: 62 IN | TEMPERATURE: 98.3 F | SYSTOLIC BLOOD PRESSURE: 148 MMHG | RESPIRATION RATE: 16 BRPM | HEART RATE: 81 BPM

## 2019-11-18 DIAGNOSIS — C83.38 DIFFUSE LARGE B-CELL LYMPHOMA OF LYMPH NODES OF MULTIPLE REGIONS (HCC): Primary | ICD-10-CM

## 2019-11-18 PROCEDURE — 25010000002 PEGFILGRASTIM-CBQV 6 MG/0.6ML SOLUTION PREFILLED SYRINGE: Performed by: INTERNAL MEDICINE

## 2019-11-18 PROCEDURE — 96372 THER/PROPH/DIAG INJ SC/IM: CPT

## 2019-11-18 RX ADMIN — PEGFILGRASTIM-CBQV 6 MG: 6 INJECTION, SOLUTION SUBCUTANEOUS at 08:41

## 2019-11-18 NOTE — TELEPHONE ENCOUNTER
Discussed plan with Dr. Kline.  Advised patient she was due for next cycle on 12-2-19.  That is when her next office visit is scheduled.  She will be treated every 3 weeks as her lab values allow.  She can anticipate being admitted for treatment from that office visit.  I'm not sure where the 19th came from, but I do not see any appointments that day.  She also states she is still taking prednisone 100 mg daily.  This is day 8.  Advised to stop as she only takes for 5 days total each cycle.  She will need to count the days she takes it inpatient in the 5 day total.  Patient verbalized understanding.

## 2019-11-18 NOTE — TELEPHONE ENCOUNTER
----- Message from Radha Ramirez, RN sent at 11/18/2019  8:46 AM EST -----  Regarding: HYUN/RN  Please contact pt tyrelling next admit to the hospital for chemo.  Appt for Dr KUMAR now 12/2.  Pt thinks should be earlier 12/19 and is trying to arrange family travel for this.  Please call [phone]

## 2019-11-19 ENCOUNTER — READMISSION MANAGEMENT (OUTPATIENT)
Dept: CALL CENTER | Facility: HOSPITAL | Age: 69
End: 2019-11-19

## 2019-11-19 NOTE — OUTREACH NOTE
Medical Week 1 Survey      Responses   Facility patient discharged from?  [place]   Does the patient have one of the following disease processes/diagnoses(primary or secondary)?  Other   Is there a successful TCM telephone encounter documented?  No   Week 1 attempt successful?  No   Unsuccessful attempts  Attempt 1          Arun Guy RN

## 2019-11-20 ENCOUNTER — READMISSION MANAGEMENT (OUTPATIENT)
Dept: CALL CENTER | Facility: HOSPITAL | Age: 69
End: 2019-11-20

## 2019-11-20 NOTE — OUTREACH NOTE
Medical Week 1 Survey      Responses   Facility patient discharged from?  [place]   Does the patient have one of the following disease processes/diagnoses(primary or secondary)?  Other   Is there a successful TCM telephone encounter documented?  No   Week 1 attempt successful?  Yes   Call start time  1659   Call end time  1714   Discharge diagnosis  Diffuse large B-cell lymphoma of lymph nodes of multiple regions lymphoma chemotherapy   Is patient permission given to speak with other caregiver?  No   Meds reviewed with patient/caregiver?  Yes   Is the patient having any side effects they believe may be caused by any medication additions or changes?  No   Does the patient have all medications ordered at discharge?  Yes   Is the patient taking all medications as directed (includes completed medication regime)?  Yes   Does the patient have a primary care provider?   Yes   Does the patient have an appointment with their PCP within 7 days of discharge?  No   Comments regarding PCP  Rehan Fernandez MD PCP   What is preventing the patient from scheduling follow up appointments within 7 days of discharge?  -- [Patient following up with hem/oc Dr Kline. ]   Nursing Interventions  Verified appointment date/time/provider   Has the patient kept scheduled appointments due by today?  N/A   Has home health visited the patient within 72 hours of discharge?  N/A   Psychosocial issues?  No   Did the patient receive a copy of their discharge instructions?  Yes   Nursing interventions  Reviewed instructions with patient   What is the patient's perception of their health status since discharge?  Same [Patient has some concern regarding her port site. States that it has been painful since treatment. States feels better today than yesterday. Denies redness, edema, fever. ]   Is the patient/caregiver able to teach back the hierarchy of who to call/visit for symptoms/problems? PCP, Specialist, Home health nurse, Urgent Care, ED, 911   Yes   Additional teach back comments  Patient to contact Dr Painting with worsening symptoms from port: redness, swelling, worsening discomfort, fever.    Week 1 call completed?  Yes          Casandra Larios RN

## 2019-11-21 ENCOUNTER — TELEPHONE (OUTPATIENT)
Dept: ONCOLOGY | Facility: CLINIC | Age: 69
End: 2019-11-21

## 2019-11-21 NOTE — TELEPHONE ENCOUNTER
Patient called the triage line to report that the skin around port is very dark pink and sore from her breast up the side of her neck.  Patient reports that from the time that port was placed that it has been sore.  Patient reported that last night she did run a fever of 99.8, but this morning her temperature is normal.  Discussed with Dr. Painting and referred patient to Dr. Swenson with [facility], who placed port.  Patient to call surgeon.

## 2019-11-22 ENCOUNTER — APPOINTMENT (OUTPATIENT)
Dept: GENERAL RADIOLOGY | Facility: HOSPITAL | Age: 69
End: 2019-11-22

## 2019-11-22 ENCOUNTER — HOSPITAL ENCOUNTER (INPATIENT)
Facility: HOSPITAL | Age: 69
LOS: 2 days | Discharge: HOME | End: 2019-11-25
Attending: EMERGENCY MEDICINE | Admitting: INTERNAL MEDICINE

## 2019-11-22 DIAGNOSIS — D69.6 THROMBOCYTOPENIA (HCC): ICD-10-CM

## 2019-11-22 DIAGNOSIS — T80.219A INFECTED VENOUS ACCESS PORT: ICD-10-CM

## 2019-11-22 DIAGNOSIS — E87.1 HYPONATREMIA: ICD-10-CM

## 2019-11-22 DIAGNOSIS — L03.313 CELLULITIS OF CHEST WALL: Primary | ICD-10-CM

## 2019-11-22 DIAGNOSIS — D61.818 PANCYTOPENIA (HCC): ICD-10-CM

## 2019-11-22 DIAGNOSIS — A41.9 SEPSIS, DUE TO UNSPECIFIED ORGANISM, UNSPECIFIED WHETHER ACUTE ORGAN DYSFUNCTION PRESENT (HCC): ICD-10-CM

## 2019-11-22 DIAGNOSIS — C85.90 NON-HODGKIN'S LYMPHOMA, UNSPECIFIED BODY REGION, UNSPECIFIED NON-HODGKIN LYMPHOMA TYPE (HCC): ICD-10-CM

## 2019-11-22 PROCEDURE — 93005 ELECTROCARDIOGRAM TRACING: CPT | Performed by: EMERGENCY MEDICINE

## 2019-11-22 PROCEDURE — 87804 INFLUENZA ASSAY W/OPTIC: CPT | Performed by: EMERGENCY MEDICINE

## 2019-11-22 PROCEDURE — 85610 PROTHROMBIN TIME: CPT | Performed by: EMERGENCY MEDICINE

## 2019-11-22 PROCEDURE — 87040 BLOOD CULTURE FOR BACTERIA: CPT | Performed by: EMERGENCY MEDICINE

## 2019-11-22 PROCEDURE — 83735 ASSAY OF MAGNESIUM: CPT | Performed by: EMERGENCY MEDICINE

## 2019-11-22 PROCEDURE — 87147 CULTURE TYPE IMMUNOLOGIC: CPT | Performed by: EMERGENCY MEDICINE

## 2019-11-22 PROCEDURE — 87150 DNA/RNA AMPLIFIED PROBE: CPT | Performed by: EMERGENCY MEDICINE

## 2019-11-22 PROCEDURE — 87186 SC STD MICRODIL/AGAR DIL: CPT | Performed by: EMERGENCY MEDICINE

## 2019-11-22 PROCEDURE — 71045 X-RAY EXAM CHEST 1 VIEW: CPT

## 2019-11-22 PROCEDURE — 80053 COMPREHEN METABOLIC PANEL: CPT | Performed by: EMERGENCY MEDICINE

## 2019-11-22 PROCEDURE — 85025 COMPLETE CBC W/AUTO DIFF WBC: CPT | Performed by: EMERGENCY MEDICINE

## 2019-11-22 PROCEDURE — 84145 PROCALCITONIN (PCT): CPT | Performed by: EMERGENCY MEDICINE

## 2019-11-22 PROCEDURE — 99284 EMERGENCY DEPT VISIT MOD MDM: CPT

## 2019-11-22 PROCEDURE — 83605 ASSAY OF LACTIC ACID: CPT | Performed by: EMERGENCY MEDICINE

## 2019-11-22 PROCEDURE — 85007 BL SMEAR W/DIFF WBC COUNT: CPT | Performed by: EMERGENCY MEDICINE

## 2019-11-22 PROCEDURE — 25010000002 CEFEPIME PER 500 MG: Performed by: EMERGENCY MEDICINE

## 2019-11-22 PROCEDURE — 99285 EMERGENCY DEPT VISIT HI MDM: CPT

## 2019-11-22 RX ORDER — SULFAMETHOXAZOLE AND TRIMETHOPRIM 800; 160 MG/1; MG/1
1 TABLET ORAL 2 TIMES DAILY
COMMUNITY
End: 2019-11-25 | Stop reason: HOSPADM

## 2019-11-22 RX ORDER — ACETAMINOPHEN 500 MG
1000 TABLET ORAL ONCE
Status: COMPLETED | OUTPATIENT
Start: 2019-11-22 | End: 2019-11-22

## 2019-11-22 RX ORDER — METHYLPREDNISOLONE SODIUM SUCCINATE 125 MG/2ML
125 INJECTION, POWDER, LYOPHILIZED, FOR SOLUTION INTRAMUSCULAR; INTRAVENOUS ONCE
Status: DISCONTINUED | OUTPATIENT
Start: 2019-11-22 | End: 2019-11-23

## 2019-11-22 RX ORDER — SODIUM CHLORIDE 0.9 % (FLUSH) 0.9 %
10 SYRINGE (ML) INJECTION AS NEEDED
Status: DISCONTINUED | OUTPATIENT
Start: 2019-11-22 | End: 2019-11-25 | Stop reason: HOSPADM

## 2019-11-22 RX ORDER — DIPHENHYDRAMINE HYDROCHLORIDE 50 MG/ML
25 INJECTION INTRAMUSCULAR; INTRAVENOUS ONCE
Status: DISCONTINUED | OUTPATIENT
Start: 2019-11-22 | End: 2019-11-23

## 2019-11-22 RX ADMIN — SODIUM CHLORIDE 1000 ML: 9 INJECTION, SOLUTION INTRAVENOUS at 22:37

## 2019-11-22 RX ADMIN — HEPARIN 300 UNITS: 100 SYRINGE at 22:20

## 2019-11-22 RX ADMIN — ACETAMINOPHEN 1000 MG: 500 TABLET, FILM COATED ORAL at 22:39

## 2019-11-22 RX ADMIN — CEFEPIME HYDROCHLORIDE 2 G: 2 INJECTION, POWDER, FOR SOLUTION INTRAVENOUS at 23:33

## 2019-11-23 ENCOUNTER — APPOINTMENT (OUTPATIENT)
Dept: CT IMAGING | Facility: HOSPITAL | Age: 69
End: 2019-11-23

## 2019-11-23 ENCOUNTER — ANESTHESIA (OUTPATIENT)
Dept: PERIOP | Facility: HOSPITAL | Age: 69
End: 2019-11-23

## 2019-11-23 ENCOUNTER — ANESTHESIA EVENT (OUTPATIENT)
Dept: PERIOP | Facility: HOSPITAL | Age: 69
End: 2019-11-23

## 2019-11-23 PROBLEM — E83.42 HYPOMAGNESEMIA: Status: ACTIVE | Noted: 2019-11-23

## 2019-11-23 PROBLEM — D70.9 NEUTROPENIC FEVER (HCC): Status: ACTIVE | Noted: 2019-11-23

## 2019-11-23 PROBLEM — D61.810 PANCYTOPENIA DUE TO CHEMOTHERAPY (HCC): Status: ACTIVE | Noted: 2019-11-23

## 2019-11-23 PROBLEM — A41.9 SEPSIS (HCC): Status: ACTIVE | Noted: 2019-11-23

## 2019-11-23 PROBLEM — L03.313 CELLULITIS OF CHEST WALL: Status: ACTIVE | Noted: 2019-11-23

## 2019-11-23 PROBLEM — R50.81 NEUTROPENIC FEVER (HCC): Status: ACTIVE | Noted: 2019-11-23

## 2019-11-23 LAB
ALBUMIN SERPL-MCNC: 3 G/DL (ref 3.5–5.2)
ALBUMIN/GLOB SERPL: 1.4 G/DL
ALP SERPL-CCNC: 64 U/L (ref 39–117)
ALT SERPL W P-5'-P-CCNC: 11 U/L (ref 1–33)
ANION GAP SERPL CALCULATED.3IONS-SCNC: 11 MMOL/L (ref 5–15)
ANION GAP SERPL CALCULATED.3IONS-SCNC: 12 MMOL/L (ref 5–15)
AST SERPL-CCNC: 11 U/L (ref 1–32)
BACTERIA BLD CULT: ABNORMAL
BASOPHILS # BLD AUTO: 0.01 10*3/MM3 (ref 0–0.2)
BASOPHILS # BLD AUTO: 0.03 10*3/MM3 (ref 0–0.2)
BASOPHILS NFR BLD AUTO: 0.6 % (ref 0–1.5)
BASOPHILS NFR BLD AUTO: 1.1 % (ref 0–1.5)
BILIRUB SERPL-MCNC: 0.5 MG/DL (ref 0.2–1.2)
BILIRUB UR QL STRIP: NEGATIVE
BUN BLD-MCNC: 13 MG/DL (ref 8–23)
BUN BLD-MCNC: 15 MG/DL (ref 8–23)
BUN/CREAT SERPL: 19.2 (ref 7–25)
BUN/CREAT SERPL: 19.4 (ref 7–25)
CALCIUM SPEC-SCNC: 7.8 MG/DL (ref 8.6–10.5)
CALCIUM SPEC-SCNC: 8.1 MG/DL (ref 8.6–10.5)
CHLORIDE SERPL-SCNC: 100 MMOL/L (ref 98–107)
CHLORIDE SERPL-SCNC: 103 MMOL/L (ref 98–107)
CLARITY UR: CLEAR
CO2 SERPL-SCNC: 20 MMOL/L (ref 22–29)
CO2 SERPL-SCNC: 21 MMOL/L (ref 22–29)
COLOR UR: YELLOW
CREAT BLD-MCNC: 0.67 MG/DL (ref 0.57–1)
CREAT BLD-MCNC: 0.78 MG/DL (ref 0.57–1)
CREAT UR-MCNC: 50.1 MG/DL
D-LACTATE SERPL-SCNC: 1.1 MMOL/L (ref 0.5–2)
DEPRECATED RDW RBC AUTO: 47.5 FL (ref 37–54)
DEPRECATED RDW RBC AUTO: 48.2 FL (ref 37–54)
EOSINOPHIL # BLD AUTO: 0.03 10*3/MM3 (ref 0–0.4)
EOSINOPHIL # BLD AUTO: 0.04 10*3/MM3 (ref 0–0.4)
EOSINOPHIL NFR BLD AUTO: 1.5 % (ref 0.3–6.2)
EOSINOPHIL NFR BLD AUTO: 1.8 % (ref 0.3–6.2)
ERYTHROCYTE [DISTWIDTH] IN BLOOD BY AUTOMATED COUNT: 13.7 % (ref 12.3–15.4)
ERYTHROCYTE [DISTWIDTH] IN BLOOD BY AUTOMATED COUNT: 13.8 % (ref 12.3–15.4)
FLUAV AG NPH QL: NEGATIVE
FLUBV AG NPH QL IA: NEGATIVE
GFR SERPL CREATININE-BSD FRML MDRD: 73 ML/MIN/1.73
GFR SERPL CREATININE-BSD FRML MDRD: 87 ML/MIN/1.73
GLOBULIN UR ELPH-MCNC: 2.1 GM/DL
GLUCOSE BLD-MCNC: 134 MG/DL (ref 65–99)
GLUCOSE BLD-MCNC: 141 MG/DL (ref 65–99)
GLUCOSE UR STRIP-MCNC: NEGATIVE MG/DL
HCT VFR BLD AUTO: 27.3 % (ref 34–46.6)
HCT VFR BLD AUTO: 29.1 % (ref 34–46.6)
HGB BLD-MCNC: 8.8 G/DL (ref 12–15.9)
HGB BLD-MCNC: 9.6 G/DL (ref 12–15.9)
HGB UR QL STRIP.AUTO: NEGATIVE
IMM GRANULOCYTES # BLD AUTO: 0.19 10*3/MM3 (ref 0–0.05)
IMM GRANULOCYTES # BLD AUTO: 0.3 10*3/MM3 (ref 0–0.05)
IMM GRANULOCYTES NFR BLD AUTO: 11.1 % (ref 0–0.5)
IMM GRANULOCYTES NFR BLD AUTO: 11.2 % (ref 0–0.5)
INR PPP: 1.13 (ref 0.85–1.16)
KETONES UR QL STRIP: NEGATIVE
LEUKOCYTE ESTERASE UR QL STRIP.AUTO: NEGATIVE
LYMPHOCYTES # BLD AUTO: 0.21 10*3/MM3 (ref 0.7–3.1)
LYMPHOCYTES # BLD AUTO: 0.59 10*3/MM3 (ref 0.7–3.1)
LYMPHOCYTES NFR BLD AUTO: 12.4 % (ref 19.6–45.3)
LYMPHOCYTES NFR BLD AUTO: 21.8 % (ref 19.6–45.3)
MAGNESIUM SERPL-MCNC: 1.6 MG/DL (ref 1.6–2.4)
MCH RBC QN AUTO: 30.9 PG (ref 26.6–33)
MCH RBC QN AUTO: 32.1 PG (ref 26.6–33)
MCHC RBC AUTO-ENTMCNC: 32.2 G/DL (ref 31.5–35.7)
MCHC RBC AUTO-ENTMCNC: 33 G/DL (ref 31.5–35.7)
MCV RBC AUTO: 95.8 FL (ref 79–97)
MCV RBC AUTO: 97.3 FL (ref 79–97)
MONOCYTES # BLD AUTO: 0.39 10*3/MM3 (ref 0.1–0.9)
MONOCYTES # BLD AUTO: 0.55 10*3/MM3 (ref 0.1–0.9)
MONOCYTES NFR BLD AUTO: 20.3 % (ref 5–12)
MONOCYTES NFR BLD AUTO: 23.1 % (ref 5–12)
NEUTROPHILS # BLD AUTO: 0.86 10*3/MM3 (ref 1.7–7)
NEUTROPHILS # BLD AUTO: 1.2 10*3/MM3 (ref 1.7–7)
NEUTROPHILS NFR BLD AUTO: 44.2 % (ref 42.7–76)
NEUTROPHILS NFR BLD AUTO: 50.9 % (ref 42.7–76)
NITRITE UR QL STRIP: NEGATIVE
NRBC BLD AUTO-RTO: 0 /100 WBC (ref 0–0.2)
NRBC BLD AUTO-RTO: 0 /100 WBC (ref 0–0.2)
OSMOLALITY SERPL: 281 MOSM/KG (ref 275–295)
OSMOLALITY UR: 285 MOSM/KG (ref 300–1100)
PH UR STRIP.AUTO: 6 [PH] (ref 5–8)
PLAT MORPH BLD: NORMAL
PLATELET # BLD AUTO: 126 10*3/MM3 (ref 140–450)
PLATELET # BLD AUTO: 98 10*3/MM3 (ref 140–450)
PMV BLD AUTO: 10.4 FL (ref 6–12)
PMV BLD AUTO: 11.2 FL (ref 6–12)
POTASSIUM BLD-SCNC: 3.6 MMOL/L (ref 3.5–5.2)
POTASSIUM BLD-SCNC: 3.7 MMOL/L (ref 3.5–5.2)
PROCALCITONIN SERPL-MCNC: 0.08 NG/ML (ref 0.1–0.25)
PROT SERPL-MCNC: 5.1 G/DL (ref 6–8.5)
PROT UR QL STRIP: NEGATIVE
PROT UR-MCNC: 5.3 MG/DL
PROTHROMBIN TIME: 13.9 SECONDS (ref 11.2–14.3)
RBC # BLD AUTO: 2.85 10*6/MM3 (ref 3.77–5.28)
RBC # BLD AUTO: 2.99 10*6/MM3 (ref 3.77–5.28)
RBC MORPH BLD: NORMAL
SODIUM BLD-SCNC: 131 MMOL/L (ref 136–145)
SODIUM BLD-SCNC: 136 MMOL/L (ref 136–145)
SODIUM UR-SCNC: 43 MMOL/L
SP GR UR STRIP: 1.01 (ref 1–1.03)
UROBILINOGEN UR QL STRIP: NORMAL
WBC MORPH BLD: NORMAL
WBC NRBC COR # BLD: 1.69 10*3/MM3 (ref 3.4–10.8)
WBC NRBC COR # BLD: 2.71 10*3/MM3 (ref 3.4–10.8)

## 2019-11-23 PROCEDURE — 81003 URINALYSIS AUTO W/O SCOPE: CPT | Performed by: EMERGENCY MEDICINE

## 2019-11-23 PROCEDURE — 0JPT0WZ REMOVAL OF TOTALLY IMPLANTABLE VASCULAR ACCESS DEVICE FROM TRUNK SUBCUTANEOUS TISSUE AND FASCIA, OPEN APPROACH: ICD-10-PCS | Performed by: SURGERY

## 2019-11-23 PROCEDURE — 85007 BL SMEAR W/DIFF WBC COUNT: CPT | Performed by: NURSE PRACTITIONER

## 2019-11-23 PROCEDURE — 84300 ASSAY OF URINE SODIUM: CPT | Performed by: NURSE PRACTITIONER

## 2019-11-23 PROCEDURE — 87205 SMEAR GRAM STAIN: CPT | Performed by: SURGERY

## 2019-11-23 PROCEDURE — 25010000002 VANCOMYCIN 10 G RECONSTITUTED SOLUTION: Performed by: EMERGENCY MEDICINE

## 2019-11-23 PROCEDURE — 87186 SC STD MICRODIL/AGAR DIL: CPT | Performed by: SURGERY

## 2019-11-23 PROCEDURE — 83935 ASSAY OF URINE OSMOLALITY: CPT | Performed by: NURSE PRACTITIONER

## 2019-11-23 PROCEDURE — 99223 1ST HOSP IP/OBS HIGH 75: CPT | Performed by: INTERNAL MEDICINE

## 2019-11-23 PROCEDURE — 71250 CT THORAX DX C-: CPT

## 2019-11-23 PROCEDURE — 25010000002 CEFEPIME PER 500 MG: Performed by: INTERNAL MEDICINE

## 2019-11-23 PROCEDURE — 87075 CULTR BACTERIA EXCEPT BLOOD: CPT | Performed by: SURGERY

## 2019-11-23 PROCEDURE — 80048 BASIC METABOLIC PNL TOTAL CA: CPT | Performed by: NURSE PRACTITIONER

## 2019-11-23 PROCEDURE — 02HV33Z INSERTION OF INFUSION DEVICE INTO SUPERIOR VENA CAVA, PERCUTANEOUS APPROACH: ICD-10-PCS | Performed by: INTERNAL MEDICINE

## 2019-11-23 PROCEDURE — 88300 SURGICAL PATH GROSS: CPT | Performed by: SURGERY

## 2019-11-23 PROCEDURE — 87070 CULTURE OTHR SPECIMN AEROBIC: CPT | Performed by: SURGERY

## 2019-11-23 PROCEDURE — 25010000002 CEFEPIME PER 500 MG: Performed by: NURSE PRACTITIONER

## 2019-11-23 PROCEDURE — 87147 CULTURE TYPE IMMUNOLOGIC: CPT | Performed by: SURGERY

## 2019-11-23 PROCEDURE — 85025 COMPLETE CBC W/AUTO DIFF WBC: CPT | Performed by: NURSE PRACTITIONER

## 2019-11-23 PROCEDURE — 82570 ASSAY OF URINE CREATININE: CPT | Performed by: NURSE PRACTITIONER

## 2019-11-23 PROCEDURE — 25010000002 HEPARIN (PORCINE) PER 1000 UNITS: Performed by: NURSE PRACTITIONER

## 2019-11-23 PROCEDURE — 83930 ASSAY OF BLOOD OSMOLALITY: CPT | Performed by: NURSE PRACTITIONER

## 2019-11-23 PROCEDURE — 25010000002 ONDANSETRON PER 1 MG: Performed by: NURSE PRACTITIONER

## 2019-11-23 PROCEDURE — 84156 ASSAY OF PROTEIN URINE: CPT | Performed by: NURSE PRACTITIONER

## 2019-11-23 PROCEDURE — 25010000002 VANCOMYCIN PER 500 MG

## 2019-11-23 RX ORDER — SODIUM CHLORIDE 0.9 % (FLUSH) 0.9 %
10 SYRINGE (ML) INJECTION AS NEEDED
Status: DISCONTINUED | OUTPATIENT
Start: 2019-11-23 | End: 2019-11-25 | Stop reason: HOSPADM

## 2019-11-23 RX ORDER — CETIRIZINE HYDROCHLORIDE 10 MG/1
5 TABLET ORAL DAILY
Status: DISCONTINUED | OUTPATIENT
Start: 2019-11-23 | End: 2019-11-25 | Stop reason: HOSPADM

## 2019-11-23 RX ORDER — FENTANYL CITRATE 50 UG/ML
50 INJECTION, SOLUTION INTRAMUSCULAR; INTRAVENOUS
Status: DISCONTINUED | OUTPATIENT
Start: 2019-11-23 | End: 2019-11-23 | Stop reason: HOSPADM

## 2019-11-23 RX ORDER — ACETAMINOPHEN 325 MG/1
650 TABLET ORAL EVERY 4 HOURS PRN
Status: DISCONTINUED | OUTPATIENT
Start: 2019-11-23 | End: 2019-11-25 | Stop reason: HOSPADM

## 2019-11-23 RX ORDER — DOCUSATE SODIUM 100 MG/1
100 CAPSULE, LIQUID FILLED ORAL 2 TIMES DAILY
Status: DISCONTINUED | OUTPATIENT
Start: 2019-11-23 | End: 2019-11-25 | Stop reason: HOSPADM

## 2019-11-23 RX ORDER — DOCUSATE SODIUM 100 MG/1
100 CAPSULE, LIQUID FILLED ORAL 2 TIMES DAILY PRN
Status: DISCONTINUED | OUTPATIENT
Start: 2019-11-23 | End: 2019-11-25 | Stop reason: HOSPADM

## 2019-11-23 RX ORDER — VANCOMYCIN HYDROCHLORIDE 1 G/200ML
15 INJECTION, SOLUTION INTRAVENOUS EVERY 12 HOURS
Status: DISCONTINUED | OUTPATIENT
Start: 2019-11-23 | End: 2019-11-24

## 2019-11-23 RX ORDER — MAGNESIUM SULFATE HEPTAHYDRATE 40 MG/ML
2 INJECTION, SOLUTION INTRAVENOUS AS NEEDED
Status: DISCONTINUED | OUTPATIENT
Start: 2019-11-23 | End: 2019-11-25 | Stop reason: HOSPADM

## 2019-11-23 RX ORDER — SODIUM CHLORIDE 0.9 % (FLUSH) 0.9 %
3 SYRINGE (ML) INJECTION EVERY 12 HOURS SCHEDULED
Status: CANCELLED | OUTPATIENT
Start: 2019-11-23

## 2019-11-23 RX ORDER — ALLOPURINOL 300 MG/1
300 TABLET ORAL DAILY
Status: DISCONTINUED | OUTPATIENT
Start: 2019-11-23 | End: 2019-11-25 | Stop reason: HOSPADM

## 2019-11-23 RX ORDER — SODIUM CHLORIDE 0.9 % (FLUSH) 0.9 %
3-10 SYRINGE (ML) INJECTION AS NEEDED
Status: CANCELLED | OUTPATIENT
Start: 2019-11-23

## 2019-11-23 RX ORDER — LIDOCAINE HYDROCHLORIDE 10 MG/ML
0.5 INJECTION, SOLUTION EPIDURAL; INFILTRATION; INTRACAUDAL; PERINEURAL ONCE AS NEEDED
Status: CANCELLED | OUTPATIENT
Start: 2019-11-23

## 2019-11-23 RX ORDER — OXYCODONE HYDROCHLORIDE AND ACETAMINOPHEN 5; 325 MG/1; MG/1
2 TABLET ORAL EVERY 4 HOURS PRN
Status: DISCONTINUED | OUTPATIENT
Start: 2019-11-23 | End: 2019-11-25 | Stop reason: HOSPADM

## 2019-11-23 RX ORDER — HEPARIN SODIUM 5000 [USP'U]/ML
5000 INJECTION, SOLUTION INTRAVENOUS; SUBCUTANEOUS EVERY 8 HOURS SCHEDULED
Status: DISCONTINUED | OUTPATIENT
Start: 2019-11-23 | End: 2019-11-25 | Stop reason: HOSPADM

## 2019-11-23 RX ORDER — ACETAMINOPHEN 160 MG/5ML
650 SOLUTION ORAL EVERY 4 HOURS PRN
Status: DISCONTINUED | OUTPATIENT
Start: 2019-11-23 | End: 2019-11-25 | Stop reason: HOSPADM

## 2019-11-23 RX ORDER — SODIUM CHLORIDE 0.9 % (FLUSH) 0.9 %
10 SYRINGE (ML) INJECTION EVERY 12 HOURS SCHEDULED
Status: DISCONTINUED | OUTPATIENT
Start: 2019-11-23 | End: 2019-11-25 | Stop reason: HOSPADM

## 2019-11-23 RX ORDER — MAGNESIUM SULFATE HEPTAHYDRATE 40 MG/ML
4 INJECTION, SOLUTION INTRAVENOUS AS NEEDED
Status: DISCONTINUED | OUTPATIENT
Start: 2019-11-23 | End: 2019-11-25 | Stop reason: HOSPADM

## 2019-11-23 RX ORDER — FAMOTIDINE 20 MG/1
20 TABLET, FILM COATED ORAL ONCE
Status: CANCELLED | OUTPATIENT
Start: 2019-11-23 | End: 2019-11-23

## 2019-11-23 RX ORDER — SODIUM CHLORIDE, SODIUM LACTATE, POTASSIUM CHLORIDE, CALCIUM CHLORIDE 600; 310; 30; 20 MG/100ML; MG/100ML; MG/100ML; MG/100ML
9 INJECTION, SOLUTION INTRAVENOUS CONTINUOUS
Status: DISCONTINUED | OUTPATIENT
Start: 2019-11-23 | End: 2019-11-23

## 2019-11-23 RX ORDER — ACETAMINOPHEN 650 MG/1
650 SUPPOSITORY RECTAL EVERY 4 HOURS PRN
Status: DISCONTINUED | OUTPATIENT
Start: 2019-11-23 | End: 2019-11-25 | Stop reason: HOSPADM

## 2019-11-23 RX ORDER — FAMOTIDINE 10 MG/ML
20 INJECTION, SOLUTION INTRAVENOUS ONCE
Status: COMPLETED | OUTPATIENT
Start: 2019-11-23 | End: 2019-11-23

## 2019-11-23 RX ORDER — ONDANSETRON 2 MG/ML
4 INJECTION INTRAMUSCULAR; INTRAVENOUS EVERY 6 HOURS PRN
Status: DISCONTINUED | OUTPATIENT
Start: 2019-11-23 | End: 2019-11-25 | Stop reason: HOSPADM

## 2019-11-23 RX ADMIN — VANCOMYCIN HYDROCHLORIDE 1000 MG: 1 INJECTION, SOLUTION INTRAVENOUS at 23:53

## 2019-11-23 RX ADMIN — FAMOTIDINE 20 MG: 10 INJECTION, SOLUTION INTRAVENOUS at 14:45

## 2019-11-23 RX ADMIN — ACETAMINOPHEN 650 MG: 325 TABLET ORAL at 04:19

## 2019-11-23 RX ADMIN — SODIUM CHLORIDE, PRESERVATIVE FREE 10 ML: 5 INJECTION INTRAVENOUS at 20:44

## 2019-11-23 RX ADMIN — VANCOMYCIN HYDROCHLORIDE 1250 MG: 10 INJECTION, POWDER, LYOPHILIZED, FOR SOLUTION INTRAVENOUS at 00:07

## 2019-11-23 RX ADMIN — SODIUM CHLORIDE, POTASSIUM CHLORIDE, SODIUM LACTATE AND CALCIUM CHLORIDE 9 ML/HR: 600; 310; 30; 20 INJECTION, SOLUTION INTRAVENOUS at 14:44

## 2019-11-23 RX ADMIN — HEPARIN SODIUM 5000 UNITS: 5000 INJECTION, SOLUTION INTRAVENOUS; SUBCUTANEOUS at 20:43

## 2019-11-23 RX ADMIN — CEFEPIME HYDROCHLORIDE 2 G: 2 INJECTION, POWDER, FOR SOLUTION INTRAVENOUS at 09:31

## 2019-11-23 RX ADMIN — ACETAMINOPHEN 650 MG: 325 TABLET ORAL at 16:14

## 2019-11-23 RX ADMIN — POLYETHYLENE GLYCOL 3350 17 G: 17 POWDER, FOR SOLUTION ORAL at 22:06

## 2019-11-23 RX ADMIN — HEPARIN SODIUM 5000 UNITS: 5000 INJECTION, SOLUTION INTRAVENOUS; SUBCUTANEOUS at 06:04

## 2019-11-23 RX ADMIN — ONDANSETRON 4 MG: 2 INJECTION INTRAMUSCULAR; INTRAVENOUS at 03:26

## 2019-11-23 RX ADMIN — VANCOMYCIN HYDROCHLORIDE 1000 MG: 1 INJECTION, SOLUTION INTRAVENOUS at 11:12

## 2019-11-23 RX ADMIN — DOCUSATE SODIUM 100 MG: 100 CAPSULE, LIQUID FILLED ORAL at 20:43

## 2019-11-23 RX ADMIN — CEFEPIME HYDROCHLORIDE 2 G: 2 INJECTION, POWDER, FOR SOLUTION INTRAVENOUS at 16:13

## 2019-11-23 NOTE — ANESTHESIA POSTPROCEDURE EVALUATION
Patient: Nell Montez    Procedure Summary     Date:  11/23/19 Room / Location:   [facility] OR 02 / [facility] OR    Anesthesia Start:  1515 Anesthesia Stop:  1548    Procedure:  REMOVAL VENOUS ACCESS DEVICE (N/A ) Diagnosis:      Surgeon:  Primo Swenson MD Provider:      Anesthesia Type:  general ASA Status:  2          Anesthesia Type: general  Last vitals  BP   100/43 (11/23/19 1545)   Temp   98.5 °F (36.9 °C) (11/23/19 1545)   Pulse   88 (11/23/19 1545)   Resp   16 (11/23/19 1545)     SpO2   98 % (11/23/19 1545)     Post Anesthesia Care and Evaluation    Patient location during evaluation: PACU  Patient participation: complete - patient participated  Level of consciousness: awake and alert  Pain score: 0  Pain management: adequate  Airway patency: patent  Anesthetic complications: No anesthetic complications  PONV Status: none  Cardiovascular status: hemodynamically stable and acceptable  Respiratory status: nonlabored ventilation, acceptable and nasal cannula  Hydration status: acceptable

## 2019-11-23 NOTE — ANESTHESIA PREPROCEDURE EVALUATION
Anesthesia Evaluation     Patient summary reviewed and Nursing notes reviewed   no history of anesthetic complications:  NPO Solid Status: > 8 hours  NPO Liquid Status: > 8 hours           Airway   Mallampati: II  TM distance: >3 FB  Neck ROM: full  No difficulty expected  Dental      Pulmonary - normal exam   Cardiovascular - normal exam        Neuro/Psych  (+) headaches, psychiatric history,     GI/Hepatic/Renal/Endo    (+)  GERD,  liver disease,     Musculoskeletal     Abdominal    Substance History      OB/GYN          Other      history of cancer                    Anesthesia Plan    ASA 2     general     intravenous induction     Anesthetic plan, all risks, benefits, and alternatives have been provided, discussed and informed consent has been obtained with: patient.    Plan discussed with CRNA.

## 2019-11-24 ENCOUNTER — READMISSION MANAGEMENT (OUTPATIENT)
Dept: CALL CENTER | Facility: HOSPITAL | Age: 69
End: 2019-11-24

## 2019-11-24 LAB — VANCOMYCIN TROUGH SERPL-MCNC: 10.9 MCG/ML (ref 5–20)

## 2019-11-24 PROCEDURE — C1751 CATH, INF, PER/CENT/MIDLINE: HCPCS

## 2019-11-24 PROCEDURE — 25010000002 CEFEPIME PER 500 MG: Performed by: INTERNAL MEDICINE

## 2019-11-24 PROCEDURE — 25010000002 HEPARIN (PORCINE) PER 1000 UNITS: Performed by: NURSE PRACTITIONER

## 2019-11-24 PROCEDURE — 25010000002 VANCOMYCIN 10 G RECONSTITUTED SOLUTION

## 2019-11-24 PROCEDURE — 80202 ASSAY OF VANCOMYCIN: CPT

## 2019-11-24 PROCEDURE — 25010000002 DAPTOMYCIN PER 1 MG: Performed by: INTERNAL MEDICINE

## 2019-11-24 PROCEDURE — C1894 INTRO/SHEATH, NON-LASER: HCPCS

## 2019-11-24 PROCEDURE — 99233 SBSQ HOSP IP/OBS HIGH 50: CPT | Performed by: FAMILY MEDICINE

## 2019-11-24 RX ORDER — SODIUM CHLORIDE 0.9 % (FLUSH) 0.9 %
10 SYRINGE (ML) INJECTION AS NEEDED
Status: DISCONTINUED | OUTPATIENT
Start: 2019-11-24 | End: 2019-11-25 | Stop reason: HOSPADM

## 2019-11-24 RX ORDER — SODIUM CHLORIDE 0.9 % (FLUSH) 0.9 %
10 SYRINGE (ML) INJECTION EVERY 12 HOURS SCHEDULED
Status: DISCONTINUED | OUTPATIENT
Start: 2019-11-24 | End: 2019-11-25 | Stop reason: HOSPADM

## 2019-11-24 RX ORDER — CHOLECALCIFEROL (VITAMIN D3) 125 MCG
5 CAPSULE ORAL NIGHTLY PRN
Status: DISCONTINUED | OUTPATIENT
Start: 2019-11-24 | End: 2019-11-25 | Stop reason: HOSPADM

## 2019-11-24 RX ORDER — L.ACID,PARA/B.BIFIDUM/S.THERM 8B CELL
1 CAPSULE ORAL 3 TIMES DAILY
Status: DISCONTINUED | OUTPATIENT
Start: 2019-11-24 | End: 2019-11-25 | Stop reason: HOSPADM

## 2019-11-24 RX ORDER — HYDROMORPHONE HYDROCHLORIDE 1 MG/ML
0.5 INJECTION, SOLUTION INTRAMUSCULAR; INTRAVENOUS; SUBCUTANEOUS 2 TIMES DAILY PRN
Status: DISCONTINUED | OUTPATIENT
Start: 2019-11-24 | End: 2019-11-25 | Stop reason: HOSPADM

## 2019-11-24 RX ADMIN — HEPARIN SODIUM 5000 UNITS: 5000 INJECTION, SOLUTION INTRAVENOUS; SUBCUTANEOUS at 14:49

## 2019-11-24 RX ADMIN — SODIUM CHLORIDE, PRESERVATIVE FREE 10 ML: 5 INJECTION INTRAVENOUS at 08:59

## 2019-11-24 RX ADMIN — HEPARIN SODIUM 5000 UNITS: 5000 INJECTION, SOLUTION INTRAVENOUS; SUBCUTANEOUS at 05:26

## 2019-11-24 RX ADMIN — MELATONIN TAB 5 MG 5 MG: 5 TAB at 23:34

## 2019-11-24 RX ADMIN — CEFEPIME HYDROCHLORIDE 2 G: 2 INJECTION, POWDER, FOR SOLUTION INTRAVENOUS at 01:07

## 2019-11-24 RX ADMIN — DOCUSATE SODIUM 100 MG: 100 CAPSULE, LIQUID FILLED ORAL at 08:58

## 2019-11-24 RX ADMIN — ALLOPURINOL 300 MG: 300 TABLET ORAL at 08:58

## 2019-11-24 RX ADMIN — DOCUSATE SODIUM 100 MG: 100 CAPSULE, LIQUID FILLED ORAL at 21:09

## 2019-11-24 RX ADMIN — CETIRIZINE HYDROCHLORIDE 5 MG: 10 TABLET, FILM COATED ORAL at 08:58

## 2019-11-24 RX ADMIN — HEPARIN SODIUM 5000 UNITS: 5000 INJECTION, SOLUTION INTRAVENOUS; SUBCUTANEOUS at 21:10

## 2019-11-24 RX ADMIN — CEFEPIME HYDROCHLORIDE 2 G: 2 INJECTION, POWDER, FOR SOLUTION INTRAVENOUS at 08:59

## 2019-11-24 RX ADMIN — Medication 1 CAPSULE: at 21:09

## 2019-11-24 RX ADMIN — POLYETHYLENE GLYCOL 3350 17 G: 17 POWDER, FOR SOLUTION ORAL at 21:10

## 2019-11-24 RX ADMIN — Medication 1 CAPSULE: at 12:12

## 2019-11-24 RX ADMIN — DAPTOMYCIN 500 MG: 500 INJECTION, POWDER, LYOPHILIZED, FOR SOLUTION INTRAVENOUS at 18:20

## 2019-11-24 RX ADMIN — VANCOMYCIN HYDROCHLORIDE 1250 MG: 10 INJECTION, POWDER, LYOPHILIZED, FOR SOLUTION INTRAVENOUS at 16:20

## 2019-11-25 ENCOUNTER — APPOINTMENT (OUTPATIENT)
Dept: CARDIOLOGY | Facility: HOSPITAL | Age: 69
End: 2019-11-25

## 2019-11-25 VITALS
BODY MASS INDEX: 27.82 KG/M2 | OXYGEN SATURATION: 94 % | RESPIRATION RATE: 12 BRPM | WEIGHT: 151.2 LBS | HEART RATE: 82 BPM | DIASTOLIC BLOOD PRESSURE: 63 MMHG | HEIGHT: 62 IN | TEMPERATURE: 98.3 F | SYSTOLIC BLOOD PRESSURE: 101 MMHG

## 2019-11-25 LAB
ANION GAP SERPL CALCULATED.3IONS-SCNC: 11 MMOL/L (ref 5–15)
BACTERIA SPEC AEROBE CULT: ABNORMAL
BASOPHILS # BLD MANUAL: 0.25 10*3/MM3 (ref 0–0.2)
BASOPHILS NFR BLD AUTO: 3 % (ref 0–1.5)
BH CV ECHO MEAS - EF(MOD-BP): 70 %
BUN BLD-MCNC: 12 MG/DL (ref 8–23)
BUN/CREAT SERPL: 18.8 (ref 7–25)
CALCIUM SPEC-SCNC: 8.9 MG/DL (ref 8.6–10.5)
CHLORIDE SERPL-SCNC: 107 MMOL/L (ref 98–107)
CO2 SERPL-SCNC: 24 MMOL/L (ref 22–29)
CREAT BLD-MCNC: 0.64 MG/DL (ref 0.57–1)
DEPRECATED RDW RBC AUTO: 49.4 FL (ref 37–54)
EOSINOPHIL # BLD MANUAL: 0.08 10*3/MM3 (ref 0–0.4)
EOSINOPHIL NFR BLD MANUAL: 1 % (ref 0.3–6.2)
ERYTHROCYTE [DISTWIDTH] IN BLOOD BY AUTOMATED COUNT: 14.1 % (ref 12.3–15.4)
GFR SERPL CREATININE-BSD FRML MDRD: 91 ML/MIN/1.73
GLUCOSE BLD-MCNC: 93 MG/DL (ref 65–99)
GRAM STN SPEC: ABNORMAL
HCT VFR BLD AUTO: 27.7 % (ref 34–46.6)
HGB BLD-MCNC: 9 G/DL (ref 12–15.9)
ISOLATED FROM: ABNORMAL
ISOLATED FROM: ABNORMAL
LAB AP CASE REPORT: NORMAL
LAB AP CLINICAL INFORMATION: NORMAL
LV EF 2D ECHO EST: 68 %
LYMPHOCYTES # BLD MANUAL: 1.26 10*3/MM3 (ref 0.7–3.1)
LYMPHOCYTES NFR BLD MANUAL: 15 % (ref 19.6–45.3)
LYMPHOCYTES NFR BLD MANUAL: 6 % (ref 5–12)
MAGNESIUM SERPL-MCNC: 2.3 MG/DL (ref 1.6–2.4)
MCH RBC QN AUTO: 31.5 PG (ref 26.6–33)
MCHC RBC AUTO-ENTMCNC: 32.5 G/DL (ref 31.5–35.7)
MCV RBC AUTO: 96.9 FL (ref 79–97)
METAMYELOCYTES NFR BLD MANUAL: 7 % (ref 0–0)
MONOCYTES # BLD AUTO: 0.51 10*3/MM3 (ref 0.1–0.9)
MYELOCYTES NFR BLD MANUAL: 4 % (ref 0–0)
NEUTROPHILS # BLD AUTO: 5.39 10*3/MM3 (ref 1.7–7)
NEUTROPHILS NFR BLD MANUAL: 58 % (ref 42.7–76)
NEUTS BAND NFR BLD MANUAL: 6 % (ref 0–5)
PATH REPORT.FINAL DX SPEC: NORMAL
PATH REPORT.GROSS SPEC: NORMAL
PLAT MORPH BLD: NORMAL
PLATELET # BLD AUTO: 101 10*3/MM3 (ref 140–450)
PMV BLD AUTO: 10.9 FL (ref 6–12)
POTASSIUM BLD-SCNC: 4.6 MMOL/L (ref 3.5–5.2)
RBC # BLD AUTO: 2.86 10*6/MM3 (ref 3.77–5.28)
RBC MORPH BLD: NORMAL
SODIUM BLD-SCNC: 142 MMOL/L (ref 136–145)
WBC MORPH BLD: NORMAL
WBC NRBC COR # BLD: 8.42 10*3/MM3 (ref 3.4–10.8)

## 2019-11-25 PROCEDURE — 25010000002 FENTANYL CITRATE (PF) 100 MCG/2ML SOLUTION: Performed by: INTERNAL MEDICINE

## 2019-11-25 PROCEDURE — 25010000002 HEPARIN (PORCINE) PER 1000 UNITS: Performed by: NURSE PRACTITIONER

## 2019-11-25 PROCEDURE — 85025 COMPLETE CBC W/AUTO DIFF WBC: CPT | Performed by: INTERNAL MEDICINE

## 2019-11-25 PROCEDURE — 93321 DOPPLER ECHO F-UP/LMTD STD: CPT

## 2019-11-25 PROCEDURE — 83735 ASSAY OF MAGNESIUM: CPT | Performed by: INTERNAL MEDICINE

## 2019-11-25 PROCEDURE — 85007 BL SMEAR W/DIFF WBC COUNT: CPT | Performed by: INTERNAL MEDICINE

## 2019-11-25 PROCEDURE — 93325 DOPPLER ECHO COLOR FLOW MAPG: CPT

## 2019-11-25 PROCEDURE — 93312 ECHO TRANSESOPHAGEAL: CPT

## 2019-11-25 PROCEDURE — 99239 HOSP IP/OBS DSCHRG MGMT >30: CPT | Performed by: INTERNAL MEDICINE

## 2019-11-25 PROCEDURE — 80048 BASIC METABOLIC PNL TOTAL CA: CPT | Performed by: INTERNAL MEDICINE

## 2019-11-25 PROCEDURE — 93321 DOPPLER ECHO F-UP/LMTD STD: CPT | Performed by: INTERNAL MEDICINE

## 2019-11-25 PROCEDURE — 99152 MOD SED SAME PHYS/QHP 5/>YRS: CPT

## 2019-11-25 PROCEDURE — 93312 ECHO TRANSESOPHAGEAL: CPT | Performed by: INTERNAL MEDICINE

## 2019-11-25 PROCEDURE — 93325 DOPPLER ECHO COLOR FLOW MAPG: CPT | Performed by: INTERNAL MEDICINE

## 2019-11-25 PROCEDURE — 25010000002 DAPTOMYCIN PER 1 MG: Performed by: INTERNAL MEDICINE

## 2019-11-25 PROCEDURE — 25010000002 MIDAZOLAM PER 1 MG: Performed by: INTERNAL MEDICINE

## 2019-11-25 RX ORDER — MIDAZOLAM HYDROCHLORIDE 1 MG/ML
INJECTION INTRAMUSCULAR; INTRAVENOUS
Status: COMPLETED | OUTPATIENT
Start: 2019-11-25 | End: 2019-11-25

## 2019-11-25 RX ORDER — FENTANYL CITRATE 50 UG/ML
INJECTION, SOLUTION INTRAMUSCULAR; INTRAVENOUS
Status: COMPLETED | OUTPATIENT
Start: 2019-11-25 | End: 2019-11-25

## 2019-11-25 RX ADMIN — DAPTOMYCIN 500 MG: 500 INJECTION, POWDER, LYOPHILIZED, FOR SOLUTION INTRAVENOUS at 12:37

## 2019-11-25 RX ADMIN — DOCUSATE SODIUM 100 MG: 100 CAPSULE, LIQUID FILLED ORAL at 08:42

## 2019-11-25 RX ADMIN — FENTANYL CITRATE 25 MCG: 50 INJECTION, SOLUTION INTRAMUSCULAR; INTRAVENOUS at 11:32

## 2019-11-25 RX ADMIN — MIDAZOLAM HYDROCHLORIDE 2 MG: 1 INJECTION, SOLUTION INTRAMUSCULAR; INTRAVENOUS at 11:33

## 2019-11-25 RX ADMIN — Medication 1 CAPSULE: at 15:17

## 2019-11-25 RX ADMIN — MIDAZOLAM HYDROCHLORIDE 2 MG: 1 INJECTION, SOLUTION INTRAMUSCULAR; INTRAVENOUS at 11:27

## 2019-11-25 RX ADMIN — FENTANYL CITRATE 25 MCG: 50 INJECTION, SOLUTION INTRAMUSCULAR; INTRAVENOUS at 11:29

## 2019-11-25 RX ADMIN — ALLOPURINOL 300 MG: 300 TABLET ORAL at 08:42

## 2019-11-25 RX ADMIN — FENTANYL CITRATE 50 MCG: 50 INJECTION, SOLUTION INTRAMUSCULAR; INTRAVENOUS at 11:28

## 2019-11-25 RX ADMIN — MIDAZOLAM HYDROCHLORIDE 1 MG: 1 INJECTION, SOLUTION INTRAMUSCULAR; INTRAVENOUS at 11:43

## 2019-11-25 RX ADMIN — Medication 1 CAPSULE: at 08:42

## 2019-11-25 RX ADMIN — CETIRIZINE HYDROCHLORIDE 5 MG: 10 TABLET, FILM COATED ORAL at 08:42

## 2019-11-25 RX ADMIN — HEPARIN SODIUM 5000 UNITS: 5000 INJECTION, SOLUTION INTRAVENOUS; SUBCUTANEOUS at 06:23

## 2019-11-25 NOTE — OUTREACH NOTE
Medical Week 2 Survey      Responses   Facility patient discharged from?  [place]   Does the patient have one of the following disease processes/diagnoses(primary or secondary)?  Other   Week 2 attempt successful?  No   Revoke  Readmitted          Lola Trinh RN

## 2019-11-26 ENCOUNTER — READMISSION MANAGEMENT (OUTPATIENT)
Dept: CALL CENTER | Facility: HOSPITAL | Age: 69
End: 2019-11-26

## 2019-11-26 ENCOUNTER — DOCUMENTATION (OUTPATIENT)
Dept: ONCOLOGY | Facility: CLINIC | Age: 69
End: 2019-11-26

## 2019-11-26 LAB
BACTERIA SPEC AEROBE CULT: ABNORMAL
GRAM STN SPEC: ABNORMAL
GRAM STN SPEC: ABNORMAL
ISOLATED FROM: ABNORMAL

## 2019-11-26 NOTE — OUTREACH NOTE
Prep Survey      Responses   Facility patient discharged from?  [place]   Is patient eligible?  Yes   Discharge diagnosis  sepsis,    removed port-a-cath,    PICC line was placed   Does the patient have one of the following disease processes/diagnoses(primary or secondary)?  Sepsis   Does the patient have Home health ordered?  No   Is there a DME ordered?  No   Comments regarding appointments  daily antibiotic infusions and dressing changes at the [facility] office   Prep survey completed?  Yes          Rahel Orosco RN

## 2019-11-26 NOTE — PROGRESS NOTES
Patient called asking for her daughter, Hanna Montez, to have a written note faxed to [place] [employer] for patient's jury duty for 12/16-12/31 be deferred to April of 2020. Patient says that Hanna is coming to [place] to help care for her mother. I talked to Yudelka in Med Oncology and she said to send her an Epic in box about patient and I did that as requested. Patient gave me a fax number to send letter to and that number is [phone] with a juror number of [phone].

## 2019-11-27 ENCOUNTER — READMISSION MANAGEMENT (OUTPATIENT)
Dept: CALL CENTER | Facility: HOSPITAL | Age: 69
End: 2019-11-27

## 2019-11-27 NOTE — OUTREACH NOTE
Sepsis Week 1 Survey      Responses   Facility patient discharged from?  [place]   Does the patient have one of the following disease processes/diagnoses(primary or secondary)?  Sepsis   Is there a successful TCM telephone encounter documented?  No   Week 1 attempt successful?  Yes   Call start time  1316   Call end time  1324   Discharge diagnosis  sepsis,    removed port-a-cath,    PICC line was placed   Is patient permission given to speak with other caregiver?  Yes   Person spoke with today (if not patient) and relationship  Amee   Meds reviewed with patient/caregiver?  Yes   Is the patient having any side effects they believe may be caused by any medication additions or changes?  No   Does the patient have all medications related to this admission filled (includes all antibiotics, inhalers, nebulizers,steroids,etc.)  Yes   Is the patient taking all medications as directed (includes completed medication regime)?  Yes   Comments regarding appointments  daily antibiotic infusions and dressing changes at the [facility] office   Does the patient have a primary care provider?   Yes   Does the patient have an appointment with their PCP within 7 days of discharge?  Yes   Has the patient kept scheduled appointments due by today?  Yes   Has home health visited the patient within 72 hours of discharge?  N/A   Psychosocial issues?  No   Did the patient receive a copy of their discharge instructions?  Yes   Nursing interventions  Reviewed instructions with patient   What is the patient's perception of their health status since discharge?  Improving   Nursing interventions  Nurse provided patient education   Is the patient/caregiver able to teach back Sepsis?  S - Shivering,fever or very cold, E - Extreme pain or generalized discomfort (worst ever,especially abdomen), P - Pale or discolored skin, S - Sleepy, difficult to arouse,confused, I -   I feel like I might die-a feeling of hopelessness, S - Short of breath   Is  patient/caregiver able to teach back steps to recovery at home?  Set small, achievable goals for return to baseline health, Rest and regain strength, Make a list of questions for PCP appoinment, Exercise as tolerated, Learn about sepsis(sepsis.org)   Is the patient/caregiver able to teach back signs and symptoms of worsening condition:  Fever, Rapid heart rate (>90), Edema   Is the patient/caregiver able to teach back the hierarchy of who to call/visit for symptoms/problems? PCP, Specialist, Home health nurse, Urgent Care, ED, 911  Yes   Additional teach back comments  Dtr says she is a bit better everyday.  No issues at this time and will discuss spreading f/u appts out a bit further.   Week 1 call completed?  Yes          Karen Hinton RN

## 2019-11-28 LAB
BACTERIA SPEC ANAEROBE CULT: NORMAL
BACTERIA SPEC ANAEROBE CULT: NORMAL

## 2019-12-02 ENCOUNTER — TRANSCRIBE ORDERS (OUTPATIENT)
Dept: LAB | Facility: HOSPITAL | Age: 69
End: 2019-12-02

## 2019-12-02 ENCOUNTER — LAB (OUTPATIENT)
Dept: LAB | Facility: HOSPITAL | Age: 69
End: 2019-12-02

## 2019-12-02 ENCOUNTER — TELEPHONE (OUTPATIENT)
Dept: ONCOLOGY | Facility: CLINIC | Age: 69
End: 2019-12-02

## 2019-12-02 ENCOUNTER — OFFICE VISIT (OUTPATIENT)
Dept: ONCOLOGY | Facility: CLINIC | Age: 69
End: 2019-12-02

## 2019-12-02 VITALS
HEIGHT: 62 IN | SYSTOLIC BLOOD PRESSURE: 137 MMHG | DIASTOLIC BLOOD PRESSURE: 66 MMHG | OXYGEN SATURATION: 99 % | TEMPERATURE: 98.5 F | RESPIRATION RATE: 18 BRPM | WEIGHT: 150 LBS | BODY MASS INDEX: 27.6 KG/M2 | HEART RATE: 86 BPM

## 2019-12-02 DIAGNOSIS — B95.62 CELLULITIS DUE TO MRSA: ICD-10-CM

## 2019-12-02 DIAGNOSIS — C83.38 DIFFUSE LARGE B-CELL LYMPHOMA OF LYMPH NODES OF MULTIPLE REGIONS (HCC): Primary | Chronic | ICD-10-CM

## 2019-12-02 DIAGNOSIS — T80.212D LOCAL INFECTION DUE TO PORT-A-CATH, SUBSEQUENT ENCOUNTER: ICD-10-CM

## 2019-12-02 DIAGNOSIS — A41.02 METHICILLIN RESISTANT STAPHYLOCOCCUS AUREUS SEPTICEMIA (HCC): ICD-10-CM

## 2019-12-02 DIAGNOSIS — L03.313 CELLULITIS OF CHEST WALL: ICD-10-CM

## 2019-12-02 DIAGNOSIS — T80.211D BLOODSTREAM INFECTION DUE TO TRIPLE LUMEN CATHETER, SUBSEQUENT ENCOUNTER: ICD-10-CM

## 2019-12-02 DIAGNOSIS — L03.90 CELLULITIS DUE TO MRSA: ICD-10-CM

## 2019-12-02 DIAGNOSIS — T80.211D BLOODSTREAM INFECTION DUE TO TRIPLE LUMEN CATHETER, SUBSEQUENT ENCOUNTER: Primary | ICD-10-CM

## 2019-12-02 LAB
ALBUMIN SERPL-MCNC: 3.7 G/DL (ref 3.5–5.2)
ALBUMIN/GLOB SERPL: 1.4 G/DL
ALP SERPL-CCNC: 78 U/L (ref 39–117)
ALT SERPL W P-5'-P-CCNC: 18 U/L (ref 1–33)
ANION GAP SERPL CALCULATED.3IONS-SCNC: 9 MMOL/L (ref 5–15)
AST SERPL-CCNC: 17 U/L (ref 1–32)
BASOPHILS # BLD AUTO: 0.08 10*3/MM3 (ref 0–0.2)
BASOPHILS NFR BLD AUTO: 1.1 % (ref 0–1.5)
BILIRUB SERPL-MCNC: 0.2 MG/DL (ref 0.2–1.2)
BUN BLD-MCNC: 19 MG/DL (ref 8–23)
BUN/CREAT SERPL: 36.5 (ref 7–25)
CALCIUM SPEC-SCNC: 9.2 MG/DL (ref 8.6–10.5)
CHLORIDE SERPL-SCNC: 104 MMOL/L (ref 98–107)
CK SERPL-CCNC: 28 U/L (ref 20–180)
CO2 SERPL-SCNC: 26 MMOL/L (ref 22–29)
CREAT BLD-MCNC: 0.52 MG/DL (ref 0.57–1)
CRP SERPL-MCNC: 0.25 MG/DL (ref 0–0.5)
DEPRECATED RDW RBC AUTO: 50 FL (ref 37–54)
EOSINOPHIL # BLD AUTO: 0.06 10*3/MM3 (ref 0–0.4)
EOSINOPHIL NFR BLD AUTO: 0.8 % (ref 0.3–6.2)
ERYTHROCYTE [DISTWIDTH] IN BLOOD BY AUTOMATED COUNT: 14.5 % (ref 12.3–15.4)
ERYTHROCYTE [SEDIMENTATION RATE] IN BLOOD: 26 MM/HR (ref 0–30)
GFR SERPL CREATININE-BSD FRML MDRD: 116 ML/MIN/1.73
GLOBULIN UR ELPH-MCNC: 2.6 GM/DL
GLUCOSE BLD-MCNC: 103 MG/DL (ref 65–99)
HCT VFR BLD AUTO: 32.7 % (ref 34–46.6)
HGB BLD-MCNC: 10.4 G/DL (ref 12–15.9)
IMM GRANULOCYTES # BLD AUTO: 0.17 10*3/MM3 (ref 0–0.05)
IMM GRANULOCYTES NFR BLD AUTO: 2.3 % (ref 0–0.5)
LYMPHOCYTES # BLD AUTO: 1.14 10*3/MM3 (ref 0.7–3.1)
LYMPHOCYTES NFR BLD AUTO: 15.6 % (ref 19.6–45.3)
MCH RBC QN AUTO: 31 PG (ref 26.6–33)
MCHC RBC AUTO-ENTMCNC: 31.8 G/DL (ref 31.5–35.7)
MCV RBC AUTO: 97.6 FL (ref 79–97)
MONOCYTES # BLD AUTO: 0.44 10*3/MM3 (ref 0.1–0.9)
MONOCYTES NFR BLD AUTO: 6 % (ref 5–12)
NEUTROPHILS # BLD AUTO: 5.44 10*3/MM3 (ref 1.7–7)
NEUTROPHILS NFR BLD AUTO: 74.2 % (ref 42.7–76)
NRBC BLD AUTO-RTO: 0 /100 WBC (ref 0–0.2)
PLATELET # BLD AUTO: 313 10*3/MM3 (ref 140–450)
PMV BLD AUTO: 9.8 FL (ref 6–12)
POTASSIUM BLD-SCNC: 4.7 MMOL/L (ref 3.5–5.2)
PROT SERPL-MCNC: 6.3 G/DL (ref 6–8.5)
RBC # BLD AUTO: 3.35 10*6/MM3 (ref 3.77–5.28)
SODIUM BLD-SCNC: 139 MMOL/L (ref 136–145)
WBC NRBC COR # BLD: 7.33 10*3/MM3 (ref 3.4–10.8)

## 2019-12-02 PROCEDURE — 82550 ASSAY OF CK (CPK): CPT

## 2019-12-02 PROCEDURE — 85025 COMPLETE CBC W/AUTO DIFF WBC: CPT

## 2019-12-02 PROCEDURE — 80053 COMPREHEN METABOLIC PANEL: CPT

## 2019-12-02 PROCEDURE — 36415 COLL VENOUS BLD VENIPUNCTURE: CPT

## 2019-12-02 PROCEDURE — 99215 OFFICE O/P EST HI 40 MIN: CPT | Performed by: INTERNAL MEDICINE

## 2019-12-02 PROCEDURE — 85652 RBC SED RATE AUTOMATED: CPT

## 2019-12-02 PROCEDURE — 86140 C-REACTIVE PROTEIN: CPT

## 2019-12-02 NOTE — TELEPHONE ENCOUNTER
----- Message from Emily Diaz RN sent at 11/26/2019 10:27 AM EST -----  Shell,    Patient's daughter, Hanna Montez, has been called to jury duty from 12/16-end of year. Patient is going through chemotherapy and now has MRSA in her port. Hanna is needing to come to [place] from [place] to help care for her mother. Hanna was asking if she could have a letter sent to the Jury saying that she needs to defer being called to jury duty until April of 2020. Could you please fax a letter deferring patient from December to April of 2020 for jury duty? The fax number is: [phone]. Hanna's juror number is: [phone].    Thank you,    Emily

## 2019-12-02 NOTE — TELEPHONE ENCOUNTER
Done    Routed to Dr. Kline for approval/signature    [id number]  ~Shell        Faxed [phone]   Original given to Hanna at OV   [id number]  ~Shell

## 2019-12-02 NOTE — PROGRESS NOTES
CHIEF COMPLAINT: Non-Hodgkin lymphoma    Problem List:  Oncology/Hematology History    1. History of benign transvaginal hysterectomy 1/1/2016  2. History of reflux  3. History of transient global amnesia  4. IV dye allergy as well as allergy to barium enema  5. 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  6/8/2016 colonoscopy benign  1/2019 apparently had CT screening of the chest that showed a couple of liver abnormalities (do not have those reports).  Unable to do with contrast and IV dye allergy.  Asymptomatic.  9/5/2019 CT of the abdomen without contrast shows at least 2 hepatic lesions, 4 cm in the right lobe of the liver and 2 cm in the left lobe of the liver.  9/11/2019 PET scan shows too numerous to count increased hypermetabolism in the anterior mediastinal, subcarinal, right lower lobe, head of the pancreas, esophagus, adjacent paraesophageal lymph nodes, liver, splenic hilum, left and right adrenal glands.  Also uptake in the mesentery, right retroperitoneum, right iliac chain, periumbilical subcutaneous fat, vaginal stump, and numerous bony lesions.  The bony lesions include the spine, right shoulder, right rib, sacrum, and right  "hip.  The 1.3 cm periumbilical subcutaneous nodule appears most amenable to biopsy.  Extensive periportal and periaortic lymphadenopathy with evidence of peritoneal metastasis for which PET CT was recommended.  9/18/2019 CT-guided fine-needle aspirate of nodule within the anterior abdominal wall \"atypical\" but unable to further typify.  9/26/2019 Dr. Kline initial visit.  Reviewed PET with patient and worrisome but nondiagnostic fine-needle aspirate.  Going to get sufficient tissue not only for diagnosis but for molecular testing that could include Biotheranostics to distinguish tissue type and 15 slides for Strata testing.  We will get our navigator on board as well.  She is having some nighttime sweats without fever though she does not describe them as bed drenching but this is new for her in the last month.  The range of possibilities here are broad and include lymphoma as well as a multiplicity of carcinomas but I need more than \"atypical\" cells on a fine-needle aspirate to ultimately decide our course.  We did talk about the likelihood that what ever we are up against it is unlikely to be curative but we will wait out her risk benefit ratio once we know the pathology.  Though they may ultimately not be helpful, while we are getting tissue I will go ahead and send off a barrage of tumor markers that might help guide us to the primary.    -10/7/2019 medical oncology office visit: Preliminary path report shows T-cell rich B-cell non-Hodgkin's lymphoma.  While awaiting final pathology we will get her port and echocardiogram and plan on treating her with Rituxan CHOP with Neulasta device for her stage IV high-grade lymphoma.  She has a high risk IPI score.  We will get her LDH and uric acid today.  Risks including renal dysfunction, nausea, neuropathy, pancytopenia, cardiac dysfunction, infusion reactions, etc. were discussed in detail in addition to additional side effects as standard per all these medicines.  She " knows that if this is the T-cell rich B-cell lymphoma that we treat this like a diffuse large B-cell lymphoma and that there is a possibility but not a guarantee of cure and it is not the majority in this setting but that plan and the statistics may change if the final pathology changes.    -10/8/2019 hepatitis B panel: Negative hepatitis B core antibody, negative hepatitis B surface antigen, reactive hepatitis B surface antibody.  Will need to monitor for any elevation of liver function studies during therapy.    -10/15/2019: Communication from Dr. Damian from [place] is that the final decision is that this is T-cell rich/histiocyte rich diffuse large B cell lymphoma.  Plan to proceed with R-CHOP with cooling cap.     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  Ejection fraction 58%.  Has cooling cap [employer].  There is a international shortage of vincristine.  She will get vincristine with R-CHOP course #1 today but I am hesitant to just drop vincristine from all of her subsequent regimens but we will not have any to give her.  I plan to switch her regimen to EPOCHR but have removed the vincristine from the plan.  This will make her cooling cap a moot point since this is an 4-day infusional chemotherapy.  I will admit her on November 11 through hospitalists and plan for Neulasta shot as an outpatient on November 16.    -11/22/2019 through 11/25/2019 inpatient for MRSA line infection.  Port removed.    -12-19 hematology oncology office visit: Will finish 4 weeks of IV antibiotics with Dr. Alvarenga on approximately 12/23/2019.  Will get PET prior to then.  Has received 1 course of R-CHOP and 1 course of EPCHR.  Vincristine is now available and we will go back to R-CHOP for course #3 but we need to finish IV antibiotics to ensure clearance of  "infection.  We will see her back 12/23/2019 for R-CHOP with PET prior to return.        Liver metastases (CMS/HCC)    9/26/2019 Initial Diagnosis     Liver metastases (CMS/HCC)           Diffuse large B-cell lymphoma of lymph nodes of multiple regions (CMS/HCC)    9/26/2019 Initial Diagnosis     Diffuse large B-cell lymphoma of lymph nodes of multiple regions (CMS/HCC)            HISTORY OF PRESENT ILLNESS:  The patient is a 69 y.o. female, here for follow up on management of non-Hodgkin lymphoma.  Recent port induced MRSA.  Port now out.      Past Medical History:   Diagnosis Date   • Abnormal Pap smear of vagina     \"Abnormal pap smear\"   • Anxiety    • Cervical dysplasia    • Cystocele    • GERD (gastroesophageal reflux disease)     INTERMITTENT- NOT MEDICATION   • Migraine headache    • Osteopenia    • Transient global amnesia     fall 2015     Past Surgical History:   Procedure Laterality Date   • BREAST CYST ASPIRATION     • BREAST SURGERY      biopsy x 3   • CERVICAL BIOPSY      PT UNAWARE OF CERVIX BIOPSY   • IMPACTED THIRD MOLAR REMOVAL      wisdom teeth extracted in which two partially impacted   • OTHER SURGICAL HISTORY  10/02/2019    surgical bx of abd wall tumor   • PERIPHERALLY INSERTED CENTRAL CATHETER INSERTION Left 11/2019   • TONSILLECTOMY     • VAGINAL HYSTERECTOMY      with anterior repair   • VAGINAL HYSTERECTOMY W/ ANTERIOR AND POSTERIOR VAGINAL REPAIR N/A 11/15/2016    Procedure: VAGINAL HYSTERECTOMY WITH ANTERIOR VAGINAL REPAIR;  Surgeon: Henry Pitt MD;  Location:  [facility] OR;  Service:    • VENOUS ACCESS DEVICE (PORT) REMOVAL N/A 11/23/2019    Procedure: REMOVAL VENOUS ACCESS DEVICE;  Surgeon: Primo Swenson MD;  Location:  [facility] OR;  Service: General       Allergies   Allergen Reactions   • Betadine [Povidone Iodine]    • Barium-Containing Compounds Unknown (See Comments)     unknown   • Codeine    • Contrast Dye Unknown (See Comments)     Unknown   • E-Mycin [Erythromycin]    • " "Epinephrine    • Iodine    • Other      MSG, Rema, Preservatives in eye ointment   • Procaine Other (See Comments)     DENTAL ANASTHESIA CAUSED HEART RACING- YRS AGO\"   • Sudafed [Pseudoephedrine Hcl]        Family History and Social History reviewed and changed as necessary      REVIEW OF SYSTEM:   Review of Systems   Constitutional: Negative for appetite change, chills, diaphoresis, fatigue, fever and unexpected weight change.   HENT:   Negative for mouth sores, sore throat and trouble swallowing.    Eyes: Negative for icterus.   Respiratory: Negative for cough, hemoptysis and shortness of breath.    Cardiovascular: Negative for chest pain, leg swelling and palpitations.   Gastrointestinal: Negative for abdominal distention, abdominal pain, blood in stool, constipation, diarrhea, nausea and vomiting.   Endocrine: Negative for hot flashes.   Genitourinary: Negative for bladder incontinence, difficulty urinating, dysuria, frequency and hematuria.    Musculoskeletal: Negative for gait problem, neck pain and neck stiffness.   Skin: Negative for rash.   Neurological: Negative for dizziness, gait problem, headaches, light-headedness and numbness.   Hematological: Negative for adenopathy. Does not bruise/bleed easily.   Psychiatric/Behavioral: Negative for depression. The patient is not nervous/anxious.    All other systems reviewed and are negative.       PHYSICAL EXAM    Vitals:    12/02/19 0835   BP: 137/66   Pulse: 86   Resp: 18   Temp: 98.5 °F (36.9 °C)   SpO2: 99%   Weight: 68 kg (150 lb)   Height: 157.5 cm (62\")     Constitutional: Appears well-developed and well-nourished. No distress.   ECOG: (1) Restricted in physically strenuous activity, ambulatory and able to do work of light nature  HENT:   Head: Normocephalic.   Mouth/Throat: Oropharynx is clear and moist.   Eyes: Conjunctivae are normal. Pupils are equal, round, and reactive to light. No scleral icterus.   Neck: Neck supple. No JVD present. No " thyromegaly present.   Cardiovascular: Normal rate, regular rhythm and normal heart sounds.    Pulmonary/Chest: Breath sounds normal. No respiratory distress.   Abdominal: Soft. Exhibits no distension and no mass. There is no hepatosplenomegaly. There is no tenderness. There is no rebound and no guarding.   Musculoskeletal:Exhibits no edema, tenderness or deformity.   Neurological: Alert and oriented to person, place, and time. Exhibits normal muscle tone.   Skin: No ecchymosis, no petechiae and no rash noted. Not diaphoretic. No cyanosis. Nails show no clubbing.   Psychiatric: Normal mood and affect.   Vitals reviewed.      Lab Results   Component Value Date    HGB 9.0 (L) 11/25/2019    HCT 27.7 (L) 11/25/2019    MCV 96.9 11/25/2019     (L) 11/25/2019    WBC 8.42 11/25/2019    NEUTROABS 5.39 11/25/2019    LYMPHSABS 0.21 (L) 11/23/2019    MONOSABS 0.39 11/23/2019    EOSABS 0.08 11/25/2019    BASOSABS 0.25 (H) 11/25/2019       Lab Results   Component Value Date    GLUCOSE 93 11/25/2019    BUN 12 11/25/2019    CREATININE 0.64 11/25/2019     11/25/2019    K 4.6 11/25/2019     11/25/2019    CO2 24.0 11/25/2019    CALCIUM 8.9 11/25/2019    PROTEINTOT 5.1 (L) 11/22/2019    ALBUMIN 3.00 (L) 11/22/2019    BILITOT 0.5 11/22/2019    ALKPHOS 64 11/22/2019    AST 11 11/22/2019    ALT 11 11/22/2019                   ASSESSMENT & PLAN:    1. History of benign transvaginal hysterectomy 1/1/2016  2. History of reflux  3. History of transient global amnesia  4. IV dye allergy as well as allergy to barium enema  5. 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  6/8/2016 colonoscopy benign  1/2019 apparently had CT screening of the chest that showed a couple of liver abnormalities (do not have those reports).  Unable to do with contrast and IV dye allergy.  Asymptomatic.  9/5/2019 CT of the abdomen without contrast shows at least 2 hepatic lesions, 4 cm in the right lobe of the liver and 2 cm in the left lobe of the liver.  9/11/2019 PET scan shows too numerous to count increased hypermetabolism in the anterior mediastinal, subcarinal, right lower lobe, head of the pancreas, esophagus, adjacent paraesophageal lymph nodes, liver, splenic hilum, left and right adrenal glands.  Also uptake in the mesentery, right retroperitoneum, right iliac chain, periumbilical subcutaneous fat, vaginal stump, and numerous bony lesions.  The bony lesions include the spine, right shoulder, right rib, sacrum, and right hip.  The 1.3 cm periumbilical subcutaneous nodule appears most amenable to biopsy.  Extensive periportal and periaortic lymphadenopathy with evidence of peritoneal metastasis for which PET CT was recommended.  9/18/2019 CT-guided fine-needle aspirate of nodule within the anterior abdominal wall \"atypical\" but unable to further typify.  9/26/2019 Dr. Kline initial visit.  Reviewed PET with patient and worrisome but nondiagnostic fine-needle aspirate.  Going to get sufficient tissue not only for diagnosis but for molecular testing that could include Biotheranostics to distinguish tissue type and 15 slides for Strata testing.  We will get our navigator on board " "as well.  She is having some nighttime sweats without fever though she does not describe them as bed drenching but this is new for her in the last month.  The range of possibilities here are broad and include lymphoma as well as a multiplicity of carcinomas but I need more than \"atypical\" cells on a fine-needle aspirate to ultimately decide our course.  We did talk about the likelihood that what ever we are up against it is unlikely to be curative but we will wait out her risk benefit ratio once we know the pathology.  Though they may ultimately not be helpful, while we are getting tissue I will go ahead and send off a barrage of tumor markers that might help guide us to the primary.    -10/7/2019 medical oncology office visit: Preliminary path report shows T-cell rich B-cell non-Hodgkin's lymphoma.  While awaiting final pathology we will get her port and echocardiogram and plan on treating her with Rituxan CHOP with Neulasta device for her stage IV high-grade lymphoma.  She has a high risk IPI score.  We will get her LDH and uric acid today.  Risks including renal dysfunction, nausea, neuropathy, pancytopenia, cardiac dysfunction, infusion reactions, etc. were discussed in detail in addition to additional side effects as standard per all these medicines.  She knows that if this is the T-cell rich B-cell lymphoma that we treat this like a diffuse large B-cell lymphoma and that there is a possibility but not a guarantee of cure and it is not the majority in this setting but that plan and the statistics may change if the final pathology changes.    -10/8/2019 hepatitis B panel: Negative hepatitis B core antibody, negative hepatitis B surface antigen, reactive hepatitis B surface antibody.  Will need to monitor for any elevation of liver function studies during therapy.    -10/15/2019: Communication from Dr. Damian from [place] is that the final decision is that this is T-cell rich/histiocyte rich diffuse large B cell " lymphoma.  Plan to proceed with R-CHOP with cooling cap.     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  Ejection fraction 58%.  Has cooling cap [employer].  There is a international shortage of vincristine.  She will get vincristine with R-CHOP course #1 today but I am hesitant to just drop vincristine from all of her subsequent regimens but we will not have any to give her.  I plan to switch her regimen to EPOCHR but have removed the vincristine from the plan.  This will make her cooling cap a moot point since this is an 4-day infusional chemotherapy.  I will admit her on November 11 through hospitalists and plan for Neulasta shot as an outpatient on November 16.    -11/22/2019 through 11/25/2019 inpatient for MRSA line infection.  Port removed.    -12/2/19 hematology oncology office visit: Will finish 4 weeks of IV antibiotics with Dr. Alvarenga on approximately 12/23/2019.  Will get PET prior to then.  Has received 1 course of R-CHOP and 1 course of EPCHR.  Vincristine is now available and we will go back to R-CHOP for course #3 but we need to finish IV antibiotics to ensure clearance of infection.  We will see her back 12/23/2019 for R-CHOP with PET prior to return.  We will plan on using the PICC line that currently is being used for her IV antibiotics.  Has noted some bubbles in her urine that I am not sure the significance.  No josiane evidence of fistula.    Discussed with patient face-to-face 40 minutes greater than 50% spent discussing all the complexities and changes relative to the infection and drug shortages etc.  Ranulfo Kline MD    12/02/2019

## 2019-12-04 ENCOUNTER — READMISSION MANAGEMENT (OUTPATIENT)
Dept: CALL CENTER | Facility: HOSPITAL | Age: 69
End: 2019-12-04

## 2019-12-04 NOTE — OUTREACH NOTE
Sepsis Week 2 Survey      Responses   Facility patient discharged from?  [facility]   Does the patient have one of the following disease processes/diagnoses(primary or secondary)?  Sepsis   Week 2 attempt successful?  Yes   Call start time  1558   Revoke  Decline to participate   Call end time  1600          Magali Crespo RN

## 2019-12-09 ENCOUNTER — LAB (OUTPATIENT)
Dept: LAB | Facility: HOSPITAL | Age: 69
End: 2019-12-09

## 2019-12-09 ENCOUNTER — TRANSCRIBE ORDERS (OUTPATIENT)
Dept: LAB | Facility: HOSPITAL | Age: 69
End: 2019-12-09

## 2019-12-09 DIAGNOSIS — A41.02 METHICILLIN RESISTANT STAPHYLOCOCCUS AUREUS SEPTICEMIA (HCC): ICD-10-CM

## 2019-12-09 DIAGNOSIS — B95.62 CELLULITIS DUE TO MRSA: ICD-10-CM

## 2019-12-09 DIAGNOSIS — T80.211D BLOODSTREAM INFECTION DUE TO TRIPLE LUMEN CATHETER, SUBSEQUENT ENCOUNTER: Primary | ICD-10-CM

## 2019-12-09 DIAGNOSIS — T80.211D BLOODSTREAM INFECTION DUE TO TRIPLE LUMEN CATHETER, SUBSEQUENT ENCOUNTER: ICD-10-CM

## 2019-12-09 DIAGNOSIS — L03.90 CELLULITIS DUE TO MRSA: ICD-10-CM

## 2019-12-09 DIAGNOSIS — L03.313 CELLULITIS OF CHEST WALL: ICD-10-CM

## 2019-12-09 DIAGNOSIS — T80.212D LOCAL INFECTION DUE TO PORT-A-CATH, SUBSEQUENT ENCOUNTER: ICD-10-CM

## 2019-12-09 LAB
ALBUMIN SERPL-MCNC: 3.6 G/DL (ref 3.5–5.2)
ALBUMIN/GLOB SERPL: 1.6 G/DL
ALP SERPL-CCNC: 77 U/L (ref 39–117)
ALT SERPL W P-5'-P-CCNC: 13 U/L (ref 1–33)
ANION GAP SERPL CALCULATED.3IONS-SCNC: 7 MMOL/L (ref 5–15)
AST SERPL-CCNC: 17 U/L (ref 1–32)
BASOPHILS # BLD AUTO: 0.07 10*3/MM3 (ref 0–0.2)
BASOPHILS NFR BLD AUTO: 1.3 % (ref 0–1.5)
BILIRUB SERPL-MCNC: 0.2 MG/DL (ref 0.2–1.2)
BUN BLD-MCNC: 24 MG/DL (ref 8–23)
BUN/CREAT SERPL: 40 (ref 7–25)
CALCIUM SPEC-SCNC: 9.4 MG/DL (ref 8.6–10.5)
CHLORIDE SERPL-SCNC: 105 MMOL/L (ref 98–107)
CK SERPL-CCNC: 36 U/L (ref 20–180)
CO2 SERPL-SCNC: 28 MMOL/L (ref 22–29)
CREAT BLD-MCNC: 0.6 MG/DL (ref 0.57–1)
CRP SERPL-MCNC: 0.1 MG/DL (ref 0–0.5)
DEPRECATED RDW RBC AUTO: 52.7 FL (ref 37–54)
EOSINOPHIL # BLD AUTO: 0.11 10*3/MM3 (ref 0–0.4)
EOSINOPHIL NFR BLD AUTO: 2 % (ref 0.3–6.2)
ERYTHROCYTE [DISTWIDTH] IN BLOOD BY AUTOMATED COUNT: 15 % (ref 12.3–15.4)
ERYTHROCYTE [SEDIMENTATION RATE] IN BLOOD: 15 MM/HR (ref 0–30)
GFR SERPL CREATININE-BSD FRML MDRD: 99 ML/MIN/1.73
GLOBULIN UR ELPH-MCNC: 2.2 GM/DL
GLUCOSE BLD-MCNC: 93 MG/DL (ref 65–99)
HCT VFR BLD AUTO: 32.6 % (ref 34–46.6)
HGB BLD-MCNC: 10.5 G/DL (ref 12–15.9)
IMM GRANULOCYTES # BLD AUTO: 0.02 10*3/MM3 (ref 0–0.05)
IMM GRANULOCYTES NFR BLD AUTO: 0.4 % (ref 0–0.5)
LYMPHOCYTES # BLD AUTO: 1.13 10*3/MM3 (ref 0.7–3.1)
LYMPHOCYTES NFR BLD AUTO: 20.4 % (ref 19.6–45.3)
MCH RBC QN AUTO: 31.3 PG (ref 26.6–33)
MCHC RBC AUTO-ENTMCNC: 32.2 G/DL (ref 31.5–35.7)
MCV RBC AUTO: 97 FL (ref 79–97)
MONOCYTES # BLD AUTO: 0.59 10*3/MM3 (ref 0.1–0.9)
MONOCYTES NFR BLD AUTO: 10.7 % (ref 5–12)
NEUTROPHILS # BLD AUTO: 3.61 10*3/MM3 (ref 1.7–7)
NEUTROPHILS NFR BLD AUTO: 65.2 % (ref 42.7–76)
NRBC BLD AUTO-RTO: 0 /100 WBC (ref 0–0.2)
PLATELET # BLD AUTO: 242 10*3/MM3 (ref 140–450)
PMV BLD AUTO: 9.6 FL (ref 6–12)
POTASSIUM BLD-SCNC: 4.4 MMOL/L (ref 3.5–5.2)
PROT SERPL-MCNC: 5.8 G/DL (ref 6–8.5)
RBC # BLD AUTO: 3.36 10*6/MM3 (ref 3.77–5.28)
SODIUM BLD-SCNC: 140 MMOL/L (ref 136–145)
WBC NRBC COR # BLD: 5.53 10*3/MM3 (ref 3.4–10.8)

## 2019-12-09 PROCEDURE — 36415 COLL VENOUS BLD VENIPUNCTURE: CPT

## 2019-12-09 PROCEDURE — 85652 RBC SED RATE AUTOMATED: CPT

## 2019-12-09 PROCEDURE — 85025 COMPLETE CBC W/AUTO DIFF WBC: CPT

## 2019-12-09 PROCEDURE — 86140 C-REACTIVE PROTEIN: CPT

## 2019-12-09 PROCEDURE — 82550 ASSAY OF CK (CPK): CPT

## 2019-12-09 PROCEDURE — 80053 COMPREHEN METABOLIC PANEL: CPT

## 2019-12-11 ENCOUNTER — DOCUMENTATION (OUTPATIENT)
Dept: ONCOLOGY | Facility: CLINIC | Age: 69
End: 2019-12-11

## 2019-12-13 ENCOUNTER — HOSPITAL ENCOUNTER (OUTPATIENT)
Dept: PET IMAGING | Facility: HOSPITAL | Age: 69
Discharge: HOME | End: 2019-12-13
Admitting: INTERNAL MEDICINE

## 2019-12-13 ENCOUNTER — HOSPITAL ENCOUNTER (OUTPATIENT)
Dept: PET IMAGING | Facility: HOSPITAL | Age: 69
Discharge: HOME | End: 2019-12-13

## 2019-12-13 DIAGNOSIS — C83.38 DIFFUSE LARGE B-CELL LYMPHOMA OF LYMPH NODES OF MULTIPLE REGIONS (HCC): Chronic | ICD-10-CM

## 2019-12-13 LAB — GLUCOSE BLDC GLUCOMTR-MCNC: 93 MG/DL (ref 70–130)

## 2019-12-13 PROCEDURE — 0 FLUDEOXYGLUCOSE F18 SOLUTION: Performed by: INTERNAL MEDICINE

## 2019-12-13 PROCEDURE — A9552 F18 FDG: HCPCS | Performed by: INTERNAL MEDICINE

## 2019-12-13 PROCEDURE — 78815 PET IMAGE W/CT SKULL-THIGH: CPT

## 2019-12-13 PROCEDURE — 82962 GLUCOSE BLOOD TEST: CPT

## 2019-12-13 RX ADMIN — FLUDEOXYGLUCOSE F18 1 DOSE: 300 INJECTION INTRAVENOUS at 13:29

## 2019-12-16 ENCOUNTER — LAB (OUTPATIENT)
Dept: LAB | Facility: HOSPITAL | Age: 69
End: 2019-12-16

## 2019-12-16 ENCOUNTER — TRANSCRIBE ORDERS (OUTPATIENT)
Dept: LAB | Facility: HOSPITAL | Age: 69
End: 2019-12-16

## 2019-12-16 DIAGNOSIS — L03.90 CELLULITIS DUE TO MRSA: ICD-10-CM

## 2019-12-16 DIAGNOSIS — T80.212D LOCAL INFECTION DUE TO PORT-A-CATH, SUBSEQUENT ENCOUNTER: ICD-10-CM

## 2019-12-16 DIAGNOSIS — A41.02 METHICILLIN RESISTANT STAPHYLOCOCCUS AUREUS SEPTICEMIA (HCC): ICD-10-CM

## 2019-12-16 DIAGNOSIS — T80.211D BLOODSTREAM INFECTION DUE TO TRIPLE LUMEN CATHETER, SUBSEQUENT ENCOUNTER: Primary | ICD-10-CM

## 2019-12-16 DIAGNOSIS — T80.211D BLOODSTREAM INFECTION DUE TO TRIPLE LUMEN CATHETER, SUBSEQUENT ENCOUNTER: ICD-10-CM

## 2019-12-16 DIAGNOSIS — L03.313 CELLULITIS OF CHEST WALL: ICD-10-CM

## 2019-12-16 DIAGNOSIS — B95.62 CELLULITIS DUE TO MRSA: ICD-10-CM

## 2019-12-16 LAB
ALBUMIN SERPL-MCNC: 3.6 G/DL (ref 3.5–5.2)
ALBUMIN/GLOB SERPL: 1.8 G/DL
ALP SERPL-CCNC: 66 U/L (ref 39–117)
ALT SERPL W P-5'-P-CCNC: 15 U/L (ref 1–33)
ANION GAP SERPL CALCULATED.3IONS-SCNC: 11 MMOL/L (ref 5–15)
AST SERPL-CCNC: 21 U/L (ref 1–32)
BASOPHILS # BLD AUTO: 0.06 10*3/MM3 (ref 0–0.2)
BASOPHILS NFR BLD AUTO: 1.1 % (ref 0–1.5)
BILIRUB SERPL-MCNC: 0.3 MG/DL (ref 0.2–1.2)
BUN BLD-MCNC: 24 MG/DL (ref 8–23)
BUN/CREAT SERPL: 35.8 (ref 7–25)
CALCIUM SPEC-SCNC: 9 MG/DL (ref 8.6–10.5)
CHLORIDE SERPL-SCNC: 103 MMOL/L (ref 98–107)
CK SERPL-CCNC: 41 U/L (ref 20–180)
CO2 SERPL-SCNC: 26 MMOL/L (ref 22–29)
CREAT BLD-MCNC: 0.67 MG/DL (ref 0.57–1)
CRP SERPL-MCNC: 0.05 MG/DL (ref 0–0.5)
DEPRECATED RDW RBC AUTO: 53 FL (ref 37–54)
EOSINOPHIL # BLD AUTO: 0.23 10*3/MM3 (ref 0–0.4)
EOSINOPHIL NFR BLD AUTO: 4.2 % (ref 0.3–6.2)
ERYTHROCYTE [DISTWIDTH] IN BLOOD BY AUTOMATED COUNT: 14.8 % (ref 12.3–15.4)
ERYTHROCYTE [SEDIMENTATION RATE] IN BLOOD: 7 MM/HR (ref 0–30)
GFR SERPL CREATININE-BSD FRML MDRD: 87 ML/MIN/1.73
GLOBULIN UR ELPH-MCNC: 2 GM/DL
GLUCOSE BLD-MCNC: 92 MG/DL (ref 65–99)
HCT VFR BLD AUTO: 34.2 % (ref 34–46.6)
HGB BLD-MCNC: 10.8 G/DL (ref 12–15.9)
IMM GRANULOCYTES # BLD AUTO: 0.01 10*3/MM3 (ref 0–0.05)
IMM GRANULOCYTES NFR BLD AUTO: 0.2 % (ref 0–0.5)
LYMPHOCYTES # BLD AUTO: 1.09 10*3/MM3 (ref 0.7–3.1)
LYMPHOCYTES NFR BLD AUTO: 20 % (ref 19.6–45.3)
MCH RBC QN AUTO: 30.9 PG (ref 26.6–33)
MCHC RBC AUTO-ENTMCNC: 31.6 G/DL (ref 31.5–35.7)
MCV RBC AUTO: 97.7 FL (ref 79–97)
MONOCYTES # BLD AUTO: 0.58 10*3/MM3 (ref 0.1–0.9)
MONOCYTES NFR BLD AUTO: 10.7 % (ref 5–12)
NEUTROPHILS # BLD AUTO: 3.47 10*3/MM3 (ref 1.7–7)
NEUTROPHILS NFR BLD AUTO: 63.8 % (ref 42.7–76)
NRBC BLD AUTO-RTO: 0 /100 WBC (ref 0–0.2)
PLATELET # BLD AUTO: 190 10*3/MM3 (ref 140–450)
PMV BLD AUTO: 9.8 FL (ref 6–12)
POTASSIUM BLD-SCNC: 4.3 MMOL/L (ref 3.5–5.2)
PROT SERPL-MCNC: 5.6 G/DL (ref 6–8.5)
RBC # BLD AUTO: 3.5 10*6/MM3 (ref 3.77–5.28)
SODIUM BLD-SCNC: 140 MMOL/L (ref 136–145)
WBC NRBC COR # BLD: 5.44 10*3/MM3 (ref 3.4–10.8)

## 2019-12-16 PROCEDURE — 80053 COMPREHEN METABOLIC PANEL: CPT

## 2019-12-16 PROCEDURE — 82550 ASSAY OF CK (CPK): CPT

## 2019-12-16 PROCEDURE — 85025 COMPLETE CBC W/AUTO DIFF WBC: CPT

## 2019-12-16 PROCEDURE — 36415 COLL VENOUS BLD VENIPUNCTURE: CPT

## 2019-12-16 PROCEDURE — 85652 RBC SED RATE AUTOMATED: CPT

## 2019-12-16 PROCEDURE — 86140 C-REACTIVE PROTEIN: CPT

## 2019-12-23 ENCOUNTER — HOSPITAL ENCOUNTER (OUTPATIENT)
Dept: ONCOLOGY | Facility: HOSPITAL | Age: 69
Setting detail: INFUSION SERIES
Discharge: HOME | End: 2019-12-23

## 2019-12-23 ENCOUNTER — APPOINTMENT (OUTPATIENT)
Dept: ONCOLOGY | Facility: HOSPITAL | Age: 69
End: 2019-12-23

## 2019-12-23 ENCOUNTER — OFFICE VISIT (OUTPATIENT)
Dept: ONCOLOGY | Facility: CLINIC | Age: 69
End: 2019-12-23

## 2019-12-23 VITALS
RESPIRATION RATE: 16 BRPM | WEIGHT: 156 LBS | TEMPERATURE: 98.8 F | OXYGEN SATURATION: 97 % | HEART RATE: 87 BPM | BODY MASS INDEX: 28.71 KG/M2 | DIASTOLIC BLOOD PRESSURE: 68 MMHG | HEIGHT: 62 IN | SYSTOLIC BLOOD PRESSURE: 124 MMHG

## 2019-12-23 DIAGNOSIS — C78.7 LIVER METASTASES: Primary | Chronic | ICD-10-CM

## 2019-12-23 DIAGNOSIS — C83.38 DIFFUSE LARGE B-CELL LYMPHOMA OF LYMPH NODES OF MULTIPLE REGIONS (HCC): Chronic | ICD-10-CM

## 2019-12-23 DIAGNOSIS — Z45.2 ENCOUNTER FOR ADJUSTMENT OR MANAGEMENT OF VASCULAR ACCESS DEVICE: Primary | ICD-10-CM

## 2019-12-23 PROCEDURE — G0463 HOSPITAL OUTPT CLINIC VISIT: HCPCS

## 2019-12-23 PROCEDURE — 99214 OFFICE O/P EST MOD 30 MIN: CPT | Performed by: INTERNAL MEDICINE

## 2019-12-23 RX ORDER — PALONOSETRON 0.05 MG/ML
0.25 INJECTION, SOLUTION INTRAVENOUS ONCE
Status: CANCELLED | OUTPATIENT
Start: 2020-01-13

## 2019-12-23 RX ORDER — MEPERIDINE HYDROCHLORIDE 50 MG/ML
25 INJECTION INTRAMUSCULAR; INTRAVENOUS; SUBCUTANEOUS
Status: CANCELLED | OUTPATIENT
Start: 2020-01-13

## 2019-12-23 RX ORDER — DIPHENHYDRAMINE HYDROCHLORIDE 50 MG/ML
50 INJECTION INTRAMUSCULAR; INTRAVENOUS AS NEEDED
Status: CANCELLED | OUTPATIENT
Start: 2019-12-30

## 2019-12-23 RX ORDER — FAMOTIDINE 10 MG/ML
20 INJECTION, SOLUTION INTRAVENOUS AS NEEDED
Status: CANCELLED | OUTPATIENT
Start: 2019-12-30

## 2019-12-23 RX ORDER — ACETAMINOPHEN 325 MG/1
650 TABLET ORAL ONCE
Status: CANCELLED | OUTPATIENT
Start: 2020-01-13

## 2019-12-23 RX ORDER — DIPHENHYDRAMINE HYDROCHLORIDE 50 MG/ML
50 INJECTION INTRAMUSCULAR; INTRAVENOUS AS NEEDED
Status: CANCELLED | OUTPATIENT
Start: 2020-01-13

## 2019-12-23 RX ORDER — SODIUM CHLORIDE 0.9 % (FLUSH) 0.9 %
10 SYRINGE (ML) INJECTION AS NEEDED
Status: DISCONTINUED | OUTPATIENT
Start: 2019-12-23 | End: 2019-12-24 | Stop reason: HOSPADM

## 2019-12-23 RX ORDER — MEPERIDINE HYDROCHLORIDE 50 MG/ML
25 INJECTION INTRAMUSCULAR; INTRAVENOUS; SUBCUTANEOUS
Status: CANCELLED | OUTPATIENT
Start: 2019-12-30

## 2019-12-23 RX ORDER — SODIUM CHLORIDE 0.9 % (FLUSH) 0.9 %
10 SYRINGE (ML) INJECTION AS NEEDED
Status: CANCELLED | OUTPATIENT
Start: 2019-12-23

## 2019-12-23 RX ORDER — PALONOSETRON 0.05 MG/ML
0.25 INJECTION, SOLUTION INTRAVENOUS ONCE
Status: CANCELLED | OUTPATIENT
Start: 2019-12-30

## 2019-12-23 RX ORDER — SODIUM CHLORIDE 9 MG/ML
250 INJECTION, SOLUTION INTRAVENOUS ONCE
Status: CANCELLED | OUTPATIENT
Start: 2020-01-13

## 2019-12-23 RX ORDER — DOXORUBICIN HYDROCHLORIDE 2 MG/ML
50 INJECTION, SOLUTION INTRAVENOUS ONCE
Status: CANCELLED | OUTPATIENT
Start: 2020-01-13

## 2019-12-23 RX ORDER — FAMOTIDINE 10 MG/ML
20 INJECTION, SOLUTION INTRAVENOUS AS NEEDED
Status: CANCELLED | OUTPATIENT
Start: 2020-01-13

## 2019-12-23 RX ORDER — SODIUM CHLORIDE 9 MG/ML
250 INJECTION, SOLUTION INTRAVENOUS ONCE
Status: CANCELLED | OUTPATIENT
Start: 2019-12-30

## 2019-12-23 RX ORDER — ACETAMINOPHEN 325 MG/1
650 TABLET ORAL ONCE
Status: CANCELLED | OUTPATIENT
Start: 2019-12-30

## 2019-12-23 RX ORDER — DOXORUBICIN HYDROCHLORIDE 2 MG/ML
50 INJECTION, SOLUTION INTRAVENOUS ONCE
Status: CANCELLED | OUTPATIENT
Start: 2019-12-30

## 2019-12-23 NOTE — PROGRESS NOTES
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 History of benign transvaginal hysterectomy 1/1/2016  2. History of reflux  3. History of transient global amnesia  4. IV dye allergy as well as allergy to barium enema  5. 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  6/8/2016 colonoscopy benign  1/2019 apparently had CT screening of the chest that showed a couple of liver abnormalities (do not have those reports).  Unable to do with contrast and IV dye allergy.  Asymptomatic.  9/5/2019 CT of the abdomen without contrast shows at least 2 hepatic lesions, 4 cm in the right lobe of the liver and 2 cm in the left lobe of the liver.  9/11/2019 PET scan shows too numerous to count increased hypermetabolism in the anterior mediastinal, subcarinal, right lower lobe, head of the pancreas, esophagus, adjacent paraesophageal lymph nodes, liver, splenic hilum, left and right adrenal glands.  Also uptake in the mesentery, right retroperitoneum, right iliac chain, periumbilical subcutaneous fat, vaginal stump, and numerous bony lesions.  The bony lesions include the spine,  "right shoulder, right rib, sacrum, and right hip.  The 1.3 cm periumbilical subcutaneous nodule appears most amenable to biopsy.  Extensive periportal and periaortic lymphadenopathy with evidence of peritoneal metastasis for which PET CT was recommended.  9/18/2019 CT-guided fine-needle aspirate of nodule within the anterior abdominal wall \"atypical\" but unable to further typify.  9/26/2019 Dr. Kline initial visit.  Reviewed PET with patient and worrisome but nondiagnostic fine-needle aspirate.  Going to get sufficient tissue not only for diagnosis but for molecular testing that could include Biotheranostics to distinguish tissue type and 15 slides for Strata testing.  We will get our navigator on board as well.  She is having some nighttime sweats without fever though she does not describe them as bed drenching but this is new for her in the last month.  The range of possibilities here are broad and include lymphoma as well as a multiplicity of carcinomas but I need more than \"atypical\" cells on a fine-needle aspirate to ultimately decide our course.  We did talk about the likelihood that what ever we are up against it is unlikely to be curative but we will wait out her risk benefit ratio once we know the pathology.  Though they may ultimately not be helpful, while we are getting tissue I will go ahead and send off a barrage of tumor markers that might help guide us to the primary.    -10/7/2019 medical oncology office visit: Preliminary path report shows T-cell rich B-cell non-Hodgkin's lymphoma.  While awaiting final pathology we will get her port and echocardiogram and plan on treating her with Rituxan CHOP with Neulasta device for her stage IV high-grade lymphoma.  She has a high risk IPI score.  We will get her LDH and uric acid today.  Risks including renal dysfunction, nausea, neuropathy, pancytopenia, cardiac dysfunction, infusion reactions, etc. were discussed in detail in addition to additional side " effects as standard per all these medicines.  She knows that if this is the T-cell rich B-cell lymphoma that we treat this like a diffuse large B-cell lymphoma and that there is a possibility but not a guarantee of cure and it is not the majority in this setting but that plan and the statistics may change if the final pathology changes.    -10/8/2019 hepatitis B panel: Negative hepatitis B core antibody, negative hepatitis B surface antigen, reactive hepatitis B surface antibody.  Will need to monitor for any elevation of liver function studies during therapy.    -10/15/2019: Communication from Dr. Damian from [facility] is that the final decision is that this is T-cell rich/histiocyte rich diffuse large B cell lymphoma.  Plan to proceed with R-CHOP with cooling cap.     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  Ejection fraction 58%.  Has cooling cap Education.  There is a international shortage of vincristine.  She will get vincristine with R-CHOP course #1 today but I am hesitant to just drop vincristine from all of her subsequent regimens but we will not have any to give her.  I plan to switch her regimen to EPOCHR but have removed the vincristine from the plan.  This will make her cooling cap a moot point since this is an 4-day infusional chemotherapy.  I will admit her on November 11 through hospitalists and plan for Neulasta shot as an outpatient on November 16.    -11/22/2019 through 11/25/2019 inpatient for MRSA line infection.  Port removed.    -12/2/19 hematology oncology office visit: Will finish 4 weeks of IV antibiotics with Dr. Alvarenga on approximately 12/23/2019.  Will get PET prior to then.  Has received 1 course of R-CHOP and 1 course of EPCHR.  Vincristine is now available and we will go back to R-CHOP for course #3 but we  "need to finish IV antibiotics to ensure clearance of infection.  We will see her back 12/23/2019 for R-CHOP with PET prior to return.  We will plan on using the PICC line that currently is being used for her IV antibiotics.  Has noted some bubbles in her urine that I am not sure the significance.  No josiane evidence of fistula.        Liver metastases (CMS/HCC)    9/26/2019 Initial Diagnosis     Liver metastases (CMS/HCC)      12/13/2019 Imaging     PET is positive at the old port site but otherwise prior lymphadenopathy and bony abnormalities resolved with hemoglobin 10.8 and CMP normal otherwise unremarkable CBC status post R-CHOP x1 followed by etoposide substitution for vincristine x1 due to vincristine shortage followed by a protracted antibiotics for infection.        Diffuse large B-cell lymphoma of lymph nodes of multiple regions (CMS/HCC)    9/26/2019 Initial Diagnosis     Diffuse large B-cell lymphoma of lymph nodes of multiple regions (CMS/HCC)         HISTORY OF PRESENT ILLNESS:  The patient is a 69 y.o. female, here for follow up on management of T-cell rich histiocyte rich B-cell lymphoma.  Had a complete response on imaging 12/13/2019 his images I reviewed.  Vincristine is now back in supply.  Last antibiotics 2 days ago.      Past Medical History:   Diagnosis Date   • Abnormal Pap smear of vagina     \"Abnormal pap smear\"   • Anxiety    • Cervical dysplasia    • Cystocele    • GERD (gastroesophageal reflux disease)     INTERMITTENT- NOT MEDICATION   • Migraine headache    • Osteopenia    • Transient global amnesia     fall 2015     Past Surgical History:   Procedure Laterality Date   • BREAST CYST ASPIRATION     • BREAST SURGERY      biopsy x 3   • CERVICAL BIOPSY      PT UNAWARE OF CERVIX BIOPSY   • IMPACTED THIRD MOLAR REMOVAL      wisdom teeth extracted in which two partially impacted   • OTHER SURGICAL HISTORY  10/02/2019    surgical bx of abd wall tumor   • PERIPHERALLY INSERTED CENTRAL CATHETER " "INSERTION Left 11/2019   • TONSILLECTOMY     • VAGINAL HYSTERECTOMY      with anterior repair   • VAGINAL HYSTERECTOMY W/ ANTERIOR AND POSTERIOR VAGINAL REPAIR N/A 11/15/2016    Procedure: VAGINAL HYSTERECTOMY WITH ANTERIOR VAGINAL REPAIR;  Surgeon: Henry Pitt MD;  Location:  [facility] OR;  Service:    • VENOUS ACCESS DEVICE (PORT) REMOVAL N/A 11/23/2019    Procedure: REMOVAL VENOUS ACCESS DEVICE;  Surgeon: Primo Swenson MD;  Location:  [facility] OR;  Service: General       Allergies   Allergen Reactions   • Betadine [Povidone Iodine]    • Barium-Containing Compounds Unknown (See Comments)     unknown   • Codeine    • Contrast Dye Unknown (See Comments)     Unknown   • E-Mycin [Erythromycin]    • Epinephrine    • Iodine    • Other      MSG, Rema, Preservatives in eye ointment   • Procaine Other (See Comments)     DENTAL ANASTHESIA CAUSED HEART RACING- YRS AGO\"   • Sudafed [Pseudoephedrine Hcl]        Family History and Social History reviewed and changed as necessary      REVIEW OF SYSTEM:   Review of Systems   Constitutional: Negative for appetite change, chills, diaphoresis, fatigue, fever and unexpected weight change.   HENT:   Negative for mouth sores, sore throat and trouble swallowing.    Eyes: Negative for icterus.   Respiratory: Negative for cough, hemoptysis and shortness of breath.    Cardiovascular: Negative for chest pain, leg swelling and palpitations.   Gastrointestinal: Negative for abdominal distention, abdominal pain, blood in stool, constipation, diarrhea, nausea and vomiting.   Endocrine: Negative for hot flashes.   Genitourinary: Negative for bladder incontinence, difficulty urinating, dysuria, frequency and hematuria.    Musculoskeletal: Negative for gait problem, neck pain and neck stiffness.   Skin: Negative for rash.   Neurological: Negative for dizziness, gait problem, headaches, light-headedness and numbness.   Hematological: Negative for adenopathy. Does not bruise/bleed easily. " "  Psychiatric/Behavioral: Negative for depression. The patient is not nervous/anxious.    All other systems reviewed and are negative.       PHYSICAL EXAM    Vitals:    12/23/19 0736   BP: 124/68   Pulse: 87   Resp: 16   Temp: 98.8 °F (37.1 °C)   SpO2: 97%   Weight: 70.8 kg (156 lb)   Height: 157.5 cm (62\")     Constitutional: Appears well-developed and well-nourished. No distress.   ECOG: (1) Restricted in physically strenuous activity, ambulatory and able to do work of light nature  HENT:   Head: Normocephalic.   Mouth/Throat: Oropharynx is clear and moist.   Eyes: Conjunctivae are normal. Pupils are equal, round, and reactive to light. No scleral icterus.   Neck: Neck supple. No JVD present. No thyromegaly present.   Cardiovascular: Normal rate, regular rhythm and normal heart sounds.    Pulmonary/Chest: Breath sounds normal. No respiratory distress.   Abdominal: Soft. Exhibits no distension and no mass. There is no hepatosplenomegaly. There is no tenderness. There is no rebound and no guarding.   Musculoskeletal:Exhibits no edema, tenderness or deformity.   Neurological: Alert and oriented to person, place, and time. Exhibits normal muscle tone.   Skin: No ecchymosis, no petechiae and no rash noted. Not diaphoretic. No cyanosis. Nails show no clubbing.   Psychiatric: Normal mood and affect.   Vitals reviewed.      Lab Results   Component Value Date    HGB 10.8 (L) 12/16/2019    HCT 34.2 12/16/2019    MCV 97.7 (H) 12/16/2019     12/16/2019    WBC 5.44 12/16/2019    NEUTROABS 3.47 12/16/2019    LYMPHSABS 1.09 12/16/2019    MONOSABS 0.58 12/16/2019    EOSABS 0.23 12/16/2019    BASOSABS 0.06 12/16/2019       Lab Results   Component Value Date    GLUCOSE 92 12/16/2019    BUN 24 (H) 12/16/2019    CREATININE 0.67 12/16/2019     12/16/2019    K 4.3 12/16/2019     12/16/2019    CO2 26.0 12/16/2019    CALCIUM 9.0 12/16/2019    PROTEINTOT 5.6 (L) 12/16/2019    ALBUMIN 3.60 12/16/2019    BILITOT 0.3 " 12/16/2019    ALKPHOS 66 12/16/2019    AST 21 12/16/2019    ALT 15 12/16/2019                   ASSESSMENT & PLAN:    1. 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 Port related infection    Discussion: She has had up radiographic complete response on PET 12/13/2019.  Still with a mildly productive cough but clear sputum and no fevers.  Following with infectious disease.  Off antibiotics since 12/21/2019.  We will hold chemotherapy 1 more week given the negative PET and resume on 12/30/2019 with course #3 of treatment, course #2 of R-CHOP now that vincristine is available.  She will get Neulasta injection.  I discussed with patient face-to-face 35 minutes greater than 50% spent counseling regarding this and she was delighted at the good news.  I plan on a total of 6 courses with PET scan after the fourth and sixth courses.  We will see my nurse practitioner back 3 weeks from course #3.      Ranulfo Kline MD    12/23/2019

## 2019-12-27 ENCOUNTER — TELEPHONE (OUTPATIENT)
Dept: ONCOLOGY | Facility: CLINIC | Age: 69
End: 2019-12-27

## 2019-12-27 ENCOUNTER — DOCUMENTATION (OUTPATIENT)
Dept: ONCOLOGY | Facility: CLINIC | Age: 69
End: 2019-12-27

## 2019-12-27 ENCOUNTER — HOSPITAL ENCOUNTER (OUTPATIENT)
Dept: ONCOLOGY | Facility: HOSPITAL | Age: 69
Setting detail: INFUSION SERIES
Discharge: HOME | End: 2019-12-27

## 2019-12-27 ENCOUNTER — LAB (OUTPATIENT)
Dept: LAB | Facility: HOSPITAL | Age: 69
End: 2019-12-27

## 2019-12-27 ENCOUNTER — TRANSCRIBE ORDERS (OUTPATIENT)
Dept: LAB | Facility: HOSPITAL | Age: 69
End: 2019-12-27

## 2019-12-27 VITALS
HEART RATE: 91 BPM | TEMPERATURE: 98.2 F | RESPIRATION RATE: 16 BRPM | WEIGHT: 158 LBS | DIASTOLIC BLOOD PRESSURE: 76 MMHG | HEIGHT: 62 IN | SYSTOLIC BLOOD PRESSURE: 115 MMHG | BODY MASS INDEX: 29.08 KG/M2

## 2019-12-27 DIAGNOSIS — T45.1X5A CHEMOTHERAPY-INDUCED NEUTROPENIA (HCC): Primary | ICD-10-CM

## 2019-12-27 DIAGNOSIS — C83.38 DIFFUSE LARGE B-CELL LYMPHOMA OF LYMPH NODES OF MULTIPLE REGIONS (HCC): Primary | ICD-10-CM

## 2019-12-27 DIAGNOSIS — C85.90 LEUCOSARCOMA (HCC): ICD-10-CM

## 2019-12-27 DIAGNOSIS — D70.1 CHEMOTHERAPY-INDUCED NEUTROPENIA (HCC): ICD-10-CM

## 2019-12-27 DIAGNOSIS — A41.02 METHICILLIN RESISTANT STAPHYLOCOCCUS AUREUS SEPTICEMIA (HCC): ICD-10-CM

## 2019-12-27 DIAGNOSIS — T45.1X5A CHEMOTHERAPY-INDUCED NEUTROPENIA (HCC): ICD-10-CM

## 2019-12-27 DIAGNOSIS — C85.80 LYMPHOSARCOMA, MIXED CELL TYPE (HCC): ICD-10-CM

## 2019-12-27 DIAGNOSIS — D70.1 CHEMOTHERAPY-INDUCED NEUTROPENIA (HCC): Primary | ICD-10-CM

## 2019-12-27 DIAGNOSIS — T80.212D LOCAL INFECTION DUE TO PORT-A-CATH, SUBSEQUENT ENCOUNTER: ICD-10-CM

## 2019-12-27 DIAGNOSIS — T80.211D BLOODSTREAM INFECTION DUE TO TRIPLE LUMEN CATHETER, SUBSEQUENT ENCOUNTER: ICD-10-CM

## 2019-12-27 LAB
ALBUMIN SERPL-MCNC: 3.9 G/DL (ref 3.5–5.2)
ALBUMIN/GLOB SERPL: 1.9 G/DL
ALP SERPL-CCNC: 75 U/L (ref 39–117)
ALT SERPL W P-5'-P-CCNC: 15 U/L (ref 1–33)
ANION GAP SERPL CALCULATED.3IONS-SCNC: 11 MMOL/L (ref 5–15)
AST SERPL-CCNC: 19 U/L (ref 1–32)
BASOPHILS # BLD AUTO: 0.07 10*3/MM3 (ref 0–0.2)
BASOPHILS NFR BLD AUTO: 1.4 % (ref 0–1.5)
BILIRUB SERPL-MCNC: 0.3 MG/DL (ref 0.2–1.2)
BUN BLD-MCNC: 22 MG/DL (ref 8–23)
BUN/CREAT SERPL: 40.7 (ref 7–25)
CALCIUM SPEC-SCNC: 9.3 MG/DL (ref 8.6–10.5)
CHLORIDE SERPL-SCNC: 102 MMOL/L (ref 98–107)
CK SERPL-CCNC: 56 U/L (ref 20–180)
CO2 SERPL-SCNC: 26 MMOL/L (ref 22–29)
CREAT BLD-MCNC: 0.54 MG/DL (ref 0.57–1)
D-LACTATE SERPL-SCNC: 1.2 MMOL/L (ref 0.5–2)
DEPRECATED RDW RBC AUTO: 48.9 FL (ref 37–54)
EOSINOPHIL # BLD AUTO: 0.21 10*3/MM3 (ref 0–0.4)
EOSINOPHIL NFR BLD AUTO: 4.2 % (ref 0.3–6.2)
ERYTHROCYTE [DISTWIDTH] IN BLOOD BY AUTOMATED COUNT: 13.5 % (ref 12.3–15.4)
GFR SERPL CREATININE-BSD FRML MDRD: 111 ML/MIN/1.73
GLOBULIN UR ELPH-MCNC: 2.1 GM/DL
GLUCOSE BLD-MCNC: 99 MG/DL (ref 65–99)
HCT VFR BLD AUTO: 35.7 % (ref 34–46.6)
HGB BLD-MCNC: 11.8 G/DL (ref 12–15.9)
IMM GRANULOCYTES # BLD AUTO: 0.01 10*3/MM3 (ref 0–0.05)
IMM GRANULOCYTES NFR BLD AUTO: 0.2 % (ref 0–0.5)
LYMPHOCYTES # BLD AUTO: 1 10*3/MM3 (ref 0.7–3.1)
LYMPHOCYTES NFR BLD AUTO: 20.1 % (ref 19.6–45.3)
MCH RBC QN AUTO: 32.2 PG (ref 26.6–33)
MCHC RBC AUTO-ENTMCNC: 33.1 G/DL (ref 31.5–35.7)
MCV RBC AUTO: 97.3 FL (ref 79–97)
MONOCYTES # BLD AUTO: 0.48 10*3/MM3 (ref 0.1–0.9)
MONOCYTES NFR BLD AUTO: 9.7 % (ref 5–12)
NEUTROPHILS # BLD AUTO: 3.2 10*3/MM3 (ref 1.7–7)
NEUTROPHILS NFR BLD AUTO: 64.4 % (ref 42.7–76)
NRBC BLD AUTO-RTO: 0 /100 WBC (ref 0–0.2)
PLATELET # BLD AUTO: 147 10*3/MM3 (ref 140–450)
PMV BLD AUTO: 9.8 FL (ref 6–12)
POTASSIUM BLD-SCNC: 4.1 MMOL/L (ref 3.5–5.2)
PROT SERPL-MCNC: 6 G/DL (ref 6–8.5)
RBC # BLD AUTO: 3.67 10*6/MM3 (ref 3.77–5.28)
SODIUM BLD-SCNC: 139 MMOL/L (ref 136–145)
WBC NRBC COR # BLD: 4.97 10*3/MM3 (ref 3.4–10.8)

## 2019-12-27 PROCEDURE — 87040 BLOOD CULTURE FOR BACTERIA: CPT

## 2019-12-27 PROCEDURE — G0463 HOSPITAL OUTPT CLINIC VISIT: HCPCS

## 2019-12-27 PROCEDURE — 80053 COMPREHEN METABOLIC PANEL: CPT

## 2019-12-27 PROCEDURE — 83605 ASSAY OF LACTIC ACID: CPT

## 2019-12-27 PROCEDURE — 36415 COLL VENOUS BLD VENIPUNCTURE: CPT

## 2019-12-27 PROCEDURE — 85025 COMPLETE CBC W/AUTO DIFF WBC: CPT

## 2019-12-27 PROCEDURE — 82550 ASSAY OF CK (CPK): CPT

## 2019-12-27 NOTE — PROGRESS NOTES
0908 Patient seen per Hayde MACIAS in infusion center for pencil eraser sized red spot on left upper outer arm. Patient reports noticing this site this week. Center of site appears darker. Discomfort reported. Afebrile. PICC line appropriate in appearance. Patient instructed to see infectious disease office for advisement now and to follow up with Dr. Kline office by message today. Patient educated no signs and symptoms of infection. Patient scheduled for treatment in infusion center next week. Patient and daughter verbalized understanding of instructions. No further concerns. LAW KAMARA RN

## 2019-12-27 NOTE — TELEPHONE ENCOUNTER
Patient called triage line and reported that she has noticed a red area about 5-6 inches from PICC line.  Patient reported that she noticed it yesterday and that it looks like it has a center point the is brighter than the surrounding area.  Patient reports that she has just finished up with 4 weeks of ABX for MRSA.  She cleaned the area with alcohol and applied neosporin yesterday.  Patient denies any pain in the area.  She just feels that it needs to be assess due to having treatment scheduled for Monday.  Discussed with Manju MACIAS and she would like for patient to come into infusion and have dressing changed and infusion RN assess area, and if needed Manju can come to infusion to see patient.  Talked to Hina Charge RN, patient can come in at her convenience this morning.  Patient verbalized understanding.

## 2019-12-27 NOTE — PROGRESS NOTES
I saw Nell in the infusion area over concerns about a lesion on her left upper arm.  She has a annular erythemic lesion approximately 2 cm in diameter on the upper left arm, it is dry, there is no drainage.  The center of the lesion is a little lighter.  I am concerned as she has a history of MRSA and completed antibiotics about a week ago.  I have asked her to go to the infectious disease office to have this looked at.  We will need their input to help decide whether or not we can safely treat her on Monday.  I also called the infectious disease office, Dr. Alvarenga and his APRN are out until next week.  I have asked for someone there to take a look at her in his absence.    -I spoke with Ebony from [facility] infectious disease.  They are going to work the patient in for an appointment tomorrow.  They are getting labs drawn today, CBC, CMP, CPK and lactic acid.  She will let us know after the patient sees the MD tomorrow about treatment on Monday.  I then spoke with Nell's daughter Hanna and confirmed.  Nell will come in Monday as scheduled, treatment will depend on her visit with infectious disease this weekend.

## 2019-12-28 ENCOUNTER — APPOINTMENT (OUTPATIENT)
Dept: LAB | Facility: HOSPITAL | Age: 69
End: 2019-12-28

## 2019-12-28 DIAGNOSIS — L25.9 CONTACT DERMATITIS, UNSPECIFIED CONTACT DERMATITIS TYPE, UNSPECIFIED TRIGGER: Primary | ICD-10-CM

## 2019-12-28 PROCEDURE — 87070 CULTURE OTHR SPECIMN AEROBIC: CPT

## 2019-12-28 PROCEDURE — 87205 SMEAR GRAM STAIN: CPT

## 2019-12-28 PROCEDURE — 87102 FUNGUS ISOLATION CULTURE: CPT

## 2019-12-30 ENCOUNTER — HOSPITAL ENCOUNTER (OUTPATIENT)
Dept: ONCOLOGY | Facility: HOSPITAL | Age: 69
Setting detail: INFUSION SERIES
Discharge: HOME | End: 2019-12-30

## 2019-12-30 VITALS
RESPIRATION RATE: 16 BRPM | WEIGHT: 159 LBS | DIASTOLIC BLOOD PRESSURE: 49 MMHG | BODY MASS INDEX: 29.07 KG/M2 | SYSTOLIC BLOOD PRESSURE: 104 MMHG | HEART RATE: 60 BPM | TEMPERATURE: 97.7 F

## 2019-12-30 DIAGNOSIS — C83.38 DIFFUSE LARGE B-CELL LYMPHOMA OF LYMPH NODES OF MULTIPLE REGIONS (HCC): Primary | ICD-10-CM

## 2019-12-30 PROCEDURE — 96367 TX/PROPH/DG ADDL SEQ IV INF: CPT

## 2019-12-30 PROCEDURE — 96365 THER/PROPH/DIAG IV INF INIT: CPT

## 2019-12-30 PROCEDURE — 25010000002 VINCRISTINE PER 1 MG: Performed by: INTERNAL MEDICINE

## 2019-12-30 PROCEDURE — 96413 CHEMO IV INFUSION 1 HR: CPT

## 2019-12-30 PROCEDURE — 96375 TX/PRO/DX INJ NEW DRUG ADDON: CPT

## 2019-12-30 PROCEDURE — 25010000002 CYCLOPHOSPHAMIDE PER 100 MG: Performed by: INTERNAL MEDICINE

## 2019-12-30 PROCEDURE — 25010000002 PEGFILGRASTIM 6 MG/0.6ML PREFILLED SYRINGE KIT: Performed by: INTERNAL MEDICINE

## 2019-12-30 PROCEDURE — 96366 THER/PROPH/DIAG IV INF ADDON: CPT

## 2019-12-30 PROCEDURE — 96409 CHEMO IV PUSH SNGL DRUG: CPT

## 2019-12-30 PROCEDURE — 96417 CHEMO IV INFUS EACH ADDL SEQ: CPT

## 2019-12-30 PROCEDURE — 25010000002 DOXORUBICIN PER 10 MG: Performed by: INTERNAL MEDICINE

## 2019-12-30 PROCEDURE — 25010000002 PALONOSETRON 0.25 MG/5ML SOLUTION PREFILLED SYRINGE: Performed by: INTERNAL MEDICINE

## 2019-12-30 PROCEDURE — 25010000002 RITUXIMAB 10 MG/ML SOLUTION 50 ML VIAL: Performed by: INTERNAL MEDICINE

## 2019-12-30 PROCEDURE — 25010000002 DEXAMETHASONE PER 1 MG: Performed by: INTERNAL MEDICINE

## 2019-12-30 PROCEDURE — 96415 CHEMO IV INFUSION ADDL HR: CPT

## 2019-12-30 PROCEDURE — 25010000002 FOSAPREPITANT PER 1 MG: Performed by: INTERNAL MEDICINE

## 2019-12-30 PROCEDURE — 25010000002 RITUXIMAB 10 MG/ML SOLUTION 10 ML VIAL: Performed by: INTERNAL MEDICINE

## 2019-12-30 PROCEDURE — 25010000002 DIPHENHYDRAMINE PER 50 MG: Performed by: INTERNAL MEDICINE

## 2019-12-30 PROCEDURE — 96411 CHEMO IV PUSH ADDL DRUG: CPT

## 2019-12-30 PROCEDURE — 96377 APPLICATON ON-BODY INJECTOR: CPT

## 2019-12-30 PROCEDURE — 96374 THER/PROPH/DIAG INJ IV PUSH: CPT

## 2019-12-30 RX ORDER — PALONOSETRON 0.05 MG/ML
0.25 INJECTION, SOLUTION INTRAVENOUS ONCE
Status: COMPLETED | OUTPATIENT
Start: 2019-12-30 | End: 2019-12-30

## 2019-12-30 RX ORDER — ACETAMINOPHEN 325 MG/1
650 TABLET ORAL ONCE
Status: COMPLETED | OUTPATIENT
Start: 2019-12-30 | End: 2019-12-30

## 2019-12-30 RX ORDER — DOXORUBICIN HYDROCHLORIDE 2 MG/ML
50 INJECTION, SOLUTION INTRAVENOUS ONCE
Status: COMPLETED | OUTPATIENT
Start: 2019-12-30 | End: 2019-12-30

## 2019-12-30 RX ORDER — SODIUM CHLORIDE 9 MG/ML
250 INJECTION, SOLUTION INTRAVENOUS ONCE
Status: COMPLETED | OUTPATIENT
Start: 2019-12-30 | End: 2019-12-30

## 2019-12-30 RX ADMIN — ACETAMINOPHEN 650 MG: 325 TABLET ORAL at 08:38

## 2019-12-30 RX ADMIN — PEGFILGRASTIM 6 MG: KIT SUBCUTANEOUS at 12:44

## 2019-12-30 RX ADMIN — SODIUM CHLORIDE 150 MG: 9 INJECTION, SOLUTION INTRAVENOUS at 08:47

## 2019-12-30 RX ADMIN — DEXAMETHASONE SODIUM PHOSPHATE 12 MG: 4 INJECTION, SOLUTION INTRAMUSCULAR; INTRAVENOUS at 08:53

## 2019-12-30 RX ADMIN — RITUXIMAB 600 MG: 10 INJECTION, SOLUTION INTRAVENOUS at 09:12

## 2019-12-30 RX ADMIN — DOXORUBICIN HYDROCHLORIDE 84 MG: 2 INJECTION, SOLUTION INTRAVENOUS at 11:36

## 2019-12-30 RX ADMIN — VINCRISTINE SULFATE 2 MG: 1 INJECTION, SOLUTION INTRAVENOUS at 11:43

## 2019-12-30 RX ADMIN — SODIUM CHLORIDE 250 ML: 9 INJECTION, SOLUTION INTRAVENOUS at 08:38

## 2019-12-30 RX ADMIN — PALONOSETRON 0.25 MG: 0.25 INJECTION, SOLUTION INTRAVENOUS at 09:08

## 2019-12-30 RX ADMIN — CYCLOPHOSPHAMIDE 1270 MG: 1 INJECTION, POWDER, FOR SOLUTION INTRAVENOUS; ORAL at 12:00

## 2019-12-30 RX ADMIN — DIPHENHYDRAMINE HYDROCHLORIDE 50 MG: 50 INJECTION INTRAMUSCULAR; INTRAVENOUS at 08:40

## 2019-12-31 LAB
BACTERIA SPEC AEROBE CULT: NORMAL
GRAM STN SPEC: NORMAL
GRAM STN SPEC: NORMAL

## 2020-01-01 LAB — BACTERIA SPEC AEROBE CULT: NORMAL

## 2020-01-06 ENCOUNTER — HOSPITAL ENCOUNTER (OUTPATIENT)
Dept: ONCOLOGY | Facility: HOSPITAL | Age: 70
Setting detail: INFUSION SERIES
Discharge: HOME | End: 2020-01-06

## 2020-01-06 VITALS
DIASTOLIC BLOOD PRESSURE: 67 MMHG | HEART RATE: 102 BPM | SYSTOLIC BLOOD PRESSURE: 136 MMHG | TEMPERATURE: 97.2 F | HEIGHT: 62 IN | RESPIRATION RATE: 20 BRPM | BODY MASS INDEX: 29.26 KG/M2 | WEIGHT: 159 LBS

## 2020-01-06 DIAGNOSIS — C83.30 DIFFUSE LARGE B-CELL LYMPHOMA, UNSPECIFIED BODY REGION (HCC): Primary | ICD-10-CM

## 2020-01-06 PROCEDURE — G0463 HOSPITAL OUTPT CLINIC VISIT: HCPCS

## 2020-01-08 ENCOUNTER — TELEPHONE (OUTPATIENT)
Dept: ONCOLOGY | Facility: CLINIC | Age: 70
End: 2020-01-08

## 2020-01-08 NOTE — TELEPHONE ENCOUNTER
----- Message from COLLEEN Hills sent at 1/8/2020  1:50 PM EST -----  Regarding: RE: Medication Refill on Allopurinol 300mg qd  She can discontinue the allopurinol.  Thank you.  ----- Message -----  From: Yudelka Beard MA  Sent: 1/8/2020   1:30 PM EST  To: COLLEEN Hills  Subject: Medication Refill on Allopurinol 300mg qd        Per OV note from 11Nov19 we will be doing Allopurinol 300mg qd until end of treatment in March 2020?    Thank you,     Natanael

## 2020-01-13 ENCOUNTER — HOSPITAL ENCOUNTER (OUTPATIENT)
Dept: ONCOLOGY | Facility: HOSPITAL | Age: 70
Setting detail: INFUSION SERIES
Discharge: HOME | End: 2020-01-13

## 2020-01-13 VITALS
SYSTOLIC BLOOD PRESSURE: 117 MMHG | TEMPERATURE: 97 F | HEIGHT: 62 IN | DIASTOLIC BLOOD PRESSURE: 81 MMHG | RESPIRATION RATE: 16 BRPM | BODY MASS INDEX: 28.89 KG/M2 | HEART RATE: 107 BPM | WEIGHT: 157 LBS

## 2020-01-13 DIAGNOSIS — Z45.2 ENCOUNTER FOR ADJUSTMENT OR MANAGEMENT OF VASCULAR ACCESS DEVICE: Primary | ICD-10-CM

## 2020-01-13 RX ORDER — SODIUM CHLORIDE 0.9 % (FLUSH) 0.9 %
10 SYRINGE (ML) INJECTION AS NEEDED
Status: DISCONTINUED | OUTPATIENT
Start: 2020-01-13 | End: 2020-01-14 | Stop reason: HOSPADM

## 2020-01-13 RX ORDER — SODIUM CHLORIDE 0.9 % (FLUSH) 0.9 %
10 SYRINGE (ML) INJECTION AS NEEDED
Status: CANCELLED | OUTPATIENT
Start: 2020-01-13

## 2020-01-13 RX ORDER — HEPARIN SODIUM (PORCINE) LOCK FLUSH IV SOLN 100 UNIT/ML 100 UNIT/ML
500 SOLUTION INTRAVENOUS AS NEEDED
Status: CANCELLED | OUTPATIENT
Start: 2020-01-13

## 2020-01-13 RX ADMIN — SODIUM CHLORIDE, PRESERVATIVE FREE 10 ML: 5 INJECTION INTRAVENOUS at 11:14

## 2020-01-20 ENCOUNTER — OFFICE VISIT (OUTPATIENT)
Dept: ONCOLOGY | Facility: CLINIC | Age: 70
End: 2020-01-20

## 2020-01-20 ENCOUNTER — HOSPITAL ENCOUNTER (OUTPATIENT)
Dept: ONCOLOGY | Facility: HOSPITAL | Age: 70
Setting detail: INFUSION SERIES
Discharge: HOME | End: 2020-01-20

## 2020-01-20 VITALS
BODY MASS INDEX: 29.63 KG/M2 | HEART RATE: 84 BPM | TEMPERATURE: 98.7 F | DIASTOLIC BLOOD PRESSURE: 62 MMHG | RESPIRATION RATE: 18 BRPM | HEIGHT: 62 IN | WEIGHT: 161 LBS | OXYGEN SATURATION: 98 % | SYSTOLIC BLOOD PRESSURE: 125 MMHG

## 2020-01-20 VITALS — DIASTOLIC BLOOD PRESSURE: 53 MMHG | HEART RATE: 70 BPM | SYSTOLIC BLOOD PRESSURE: 143 MMHG

## 2020-01-20 DIAGNOSIS — C83.38 DIFFUSE LARGE B-CELL LYMPHOMA OF LYMPH NODES OF MULTIPLE REGIONS (HCC): Primary | ICD-10-CM

## 2020-01-20 DIAGNOSIS — C83.38 DIFFUSE LARGE B-CELL LYMPHOMA OF LYMPH NODES OF MULTIPLE REGIONS (HCC): ICD-10-CM

## 2020-01-20 LAB
ALBUMIN SERPL-MCNC: 3.7 G/DL (ref 3.5–5.2)
ALBUMIN/GLOB SERPL: 1.9 G/DL
ALP SERPL-CCNC: 75 U/L (ref 39–117)
ALT SERPL W P-5'-P-CCNC: 15 U/L (ref 1–33)
ANION GAP SERPL CALCULATED.3IONS-SCNC: 13 MMOL/L (ref 5–15)
AST SERPL-CCNC: 19 U/L (ref 1–32)
BILIRUB SERPL-MCNC: 0.2 MG/DL (ref 0.2–1.2)
BUN BLD-MCNC: 20 MG/DL (ref 8–23)
BUN/CREAT SERPL: 33.9 (ref 7–25)
CALCIUM SPEC-SCNC: 8.9 MG/DL (ref 8.6–10.5)
CHLORIDE SERPL-SCNC: 107 MMOL/L (ref 98–107)
CO2 SERPL-SCNC: 23 MMOL/L (ref 22–29)
CREAT BLD-MCNC: 0.59 MG/DL (ref 0.57–1)
ERYTHROCYTE [DISTWIDTH] IN BLOOD BY AUTOMATED COUNT: 15.5 % (ref 12.3–15.4)
GFR SERPL CREATININE-BSD FRML MDRD: 100 ML/MIN/1.73
GLOBULIN UR ELPH-MCNC: 2 GM/DL
GLUCOSE BLD-MCNC: 83 MG/DL (ref 65–99)
HCT VFR BLD AUTO: 31.8 % (ref 34–46.6)
HGB BLD-MCNC: 10.7 G/DL (ref 12–15.9)
LYMPHOCYTES # BLD AUTO: 1.1 10*3/MM3 (ref 0.7–3.1)
LYMPHOCYTES NFR BLD AUTO: 29.3 % (ref 19.6–45.3)
MCH RBC QN AUTO: 32.6 PG (ref 26.6–33)
MCHC RBC AUTO-ENTMCNC: 33.6 G/DL (ref 31.5–35.7)
MCV RBC AUTO: 97 FL (ref 79–97)
MONOCYTES # BLD AUTO: 0.4 10*3/MM3 (ref 0.1–0.9)
MONOCYTES NFR BLD AUTO: 10.9 % (ref 5–12)
NEUTROPHILS # BLD AUTO: 2.2 10*3/MM3 (ref 1.7–7)
NEUTROPHILS NFR BLD AUTO: 59.8 % (ref 42.7–76)
PLATELET # BLD AUTO: 216 10*3/MM3 (ref 140–450)
PMV BLD AUTO: 6.6 FL (ref 6–12)
POTASSIUM BLD-SCNC: 3.8 MMOL/L (ref 3.5–5.2)
PROT SERPL-MCNC: 5.7 G/DL (ref 6–8.5)
RBC # BLD AUTO: 3.28 10*6/MM3 (ref 3.77–5.28)
SODIUM BLD-SCNC: 143 MMOL/L (ref 136–145)
WBC NRBC COR # BLD: 3.7 10*3/MM3 (ref 3.4–10.8)

## 2020-01-20 PROCEDURE — 99214 OFFICE O/P EST MOD 30 MIN: CPT | Performed by: NURSE PRACTITIONER

## 2020-01-20 PROCEDURE — 96375 TX/PRO/DX INJ NEW DRUG ADDON: CPT

## 2020-01-20 PROCEDURE — 96368 THER/DIAG CONCURRENT INF: CPT

## 2020-01-20 PROCEDURE — 25010000002 PALONOSETRON 0.25 MG/5ML SOLUTION PREFILLED SYRINGE: Performed by: NURSE PRACTITIONER

## 2020-01-20 PROCEDURE — 96367 TX/PROPH/DG ADDL SEQ IV INF: CPT

## 2020-01-20 PROCEDURE — 25010000002 DEXAMETHASONE PER 1 MG: Performed by: NURSE PRACTITIONER

## 2020-01-20 PROCEDURE — 25010000002 VINCRISTINE PER 1 MG: Performed by: NURSE PRACTITIONER

## 2020-01-20 PROCEDURE — 25010000002 FOSAPREPITANT PER 1 MG: Performed by: NURSE PRACTITIONER

## 2020-01-20 PROCEDURE — 96413 CHEMO IV INFUSION 1 HR: CPT

## 2020-01-20 PROCEDURE — 25010000002 RITUXIMAB 10 MG/ML SOLUTION 10 ML VIAL: Performed by: NURSE PRACTITIONER

## 2020-01-20 PROCEDURE — 96415 CHEMO IV INFUSION ADDL HR: CPT

## 2020-01-20 PROCEDURE — 85025 COMPLETE CBC W/AUTO DIFF WBC: CPT | Performed by: INTERNAL MEDICINE

## 2020-01-20 PROCEDURE — 96409 CHEMO IV PUSH SNGL DRUG: CPT

## 2020-01-20 PROCEDURE — 25010000002 RITUXIMAB 10 MG/ML SOLUTION 50 ML VIAL: Performed by: NURSE PRACTITIONER

## 2020-01-20 PROCEDURE — 25010000002 DOXORUBICIN PER 10 MG: Performed by: NURSE PRACTITIONER

## 2020-01-20 PROCEDURE — 96377 APPLICATON ON-BODY INJECTOR: CPT

## 2020-01-20 PROCEDURE — 80053 COMPREHEN METABOLIC PANEL: CPT | Performed by: INTERNAL MEDICINE

## 2020-01-20 PROCEDURE — 25010000002 CYCLOPHOSPHAMIDE PER 100 MG: Performed by: NURSE PRACTITIONER

## 2020-01-20 PROCEDURE — 96411 CHEMO IV PUSH ADDL DRUG: CPT

## 2020-01-20 PROCEDURE — 25010000002 DIPHENHYDRAMINE PER 50 MG: Performed by: NURSE PRACTITIONER

## 2020-01-20 PROCEDURE — 96417 CHEMO IV INFUS EACH ADDL SEQ: CPT

## 2020-01-20 PROCEDURE — 25010000002 PEGFILGRASTIM 6 MG/0.6ML PREFILLED SYRINGE KIT: Performed by: NURSE PRACTITIONER

## 2020-01-20 RX ORDER — DOXORUBICIN HYDROCHLORIDE 2 MG/ML
50 INJECTION, SOLUTION INTRAVENOUS ONCE
Status: CANCELLED | OUTPATIENT
Start: 2020-01-20

## 2020-01-20 RX ORDER — PALONOSETRON HYDROCHLORIDE 0.05 MG/ML
0.25 INJECTION, SOLUTION INTRAVENOUS ONCE
Status: COMPLETED | OUTPATIENT
Start: 2020-01-20 | End: 2020-01-20

## 2020-01-20 RX ORDER — MEPERIDINE HYDROCHLORIDE 50 MG/ML
25 INJECTION INTRAMUSCULAR; INTRAVENOUS; SUBCUTANEOUS
Status: CANCELLED | OUTPATIENT
Start: 2020-01-20

## 2020-01-20 RX ORDER — CHOLECALCIFEROL (VITAMIN D3) 125 MCG
1 CAPSULE ORAL DAILY
COMMUNITY

## 2020-01-20 RX ORDER — ACETAMINOPHEN 325 MG/1
650 TABLET ORAL ONCE
Status: CANCELLED | OUTPATIENT
Start: 2020-01-20

## 2020-01-20 RX ORDER — ACETAMINOPHEN 325 MG/1
650 TABLET ORAL ONCE
Status: COMPLETED | OUTPATIENT
Start: 2020-01-20 | End: 2020-01-20

## 2020-01-20 RX ORDER — DOXORUBICIN HYDROCHLORIDE 2 MG/ML
50 INJECTION, SOLUTION INTRAVENOUS ONCE
Status: COMPLETED | OUTPATIENT
Start: 2020-01-20 | End: 2020-01-20

## 2020-01-20 RX ORDER — PALONOSETRON 0.05 MG/ML
0.25 INJECTION, SOLUTION INTRAVENOUS ONCE
Status: CANCELLED | OUTPATIENT
Start: 2020-01-20

## 2020-01-20 RX ORDER — DIPHENHYDRAMINE HYDROCHLORIDE 50 MG/ML
50 INJECTION INTRAMUSCULAR; INTRAVENOUS AS NEEDED
Status: CANCELLED | OUTPATIENT
Start: 2020-01-20

## 2020-01-20 RX ORDER — FAMOTIDINE 10 MG/ML
20 INJECTION, SOLUTION INTRAVENOUS AS NEEDED
Status: CANCELLED | OUTPATIENT
Start: 2020-01-20

## 2020-01-20 RX ORDER — SODIUM CHLORIDE 9 MG/ML
250 INJECTION, SOLUTION INTRAVENOUS ONCE
Status: CANCELLED | OUTPATIENT
Start: 2020-01-20

## 2020-01-20 RX ORDER — SODIUM CHLORIDE 9 MG/ML
250 INJECTION, SOLUTION INTRAVENOUS ONCE
Status: DISCONTINUED | OUTPATIENT
Start: 2020-01-20 | End: 2020-01-21 | Stop reason: HOSPADM

## 2020-01-20 RX ADMIN — SODIUM CHLORIDE 150 MG: 9 INJECTION, SOLUTION INTRAVENOUS at 13:06

## 2020-01-20 RX ADMIN — ACETAMINOPHEN 650 MG: 325 TABLET ORAL at 10:45

## 2020-01-20 RX ADMIN — VINCRISTINE SULFATE 2 MG: 1 INJECTION, SOLUTION INTRAVENOUS at 13:50

## 2020-01-20 RX ADMIN — PEGFILGRASTIM 6 MG: KIT SUBCUTANEOUS at 14:42

## 2020-01-20 RX ADMIN — CYCLOPHOSPHAMIDE 1270 MG: 1 INJECTION, POWDER, FOR SOLUTION INTRAVENOUS; ORAL at 14:08

## 2020-01-20 RX ADMIN — DEXAMETHASONE SODIUM PHOSPHATE 12 MG: 4 INJECTION, SOLUTION INTRAMUSCULAR; INTRAVENOUS at 13:05

## 2020-01-20 RX ADMIN — DIPHENHYDRAMINE HYDROCHLORIDE 50 MG: 50 INJECTION INTRAMUSCULAR; INTRAVENOUS at 10:45

## 2020-01-20 RX ADMIN — RITUXIMAB 600 MG: 10 INJECTION, SOLUTION INTRAVENOUS at 11:10

## 2020-01-20 RX ADMIN — PALONOSETRON 0.25 MG: 0.25 INJECTION, SOLUTION INTRAVENOUS at 13:02

## 2020-01-20 RX ADMIN — DOXORUBICIN HYDROCHLORIDE 84 MG: 2 INJECTION, SOLUTION INTRAVENOUS at 13:40

## 2020-01-20 NOTE — PROGRESS NOTES
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 History of benign transvaginal hysterectomy 1/1/2016  2. History of reflux  3. History of transient global amnesia  4. IV dye allergy as well as allergy to barium enema  5. 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  6/8/2016 colonoscopy benign  1/2019 apparently had CT screening of the chest that showed a couple of liver abnormalities (do not have those reports).  Unable to do with contrast and IV dye allergy.  Asymptomatic.  9/5/2019 CT of the abdomen without contrast shows at least 2 hepatic lesions, 4 cm in the right lobe of the liver and 2 cm in the left lobe of the liver.  9/11/2019 PET scan shows too numerous to count increased hypermetabolism in the anterior mediastinal, subcarinal, right lower lobe, head of the pancreas, esophagus, adjacent paraesophageal lymph nodes, liver, splenic hilum, left and right adrenal glands.  Also uptake in the mesentery, right retroperitoneum, right iliac chain, periumbilical subcutaneous fat, vaginal stump, and numerous bony lesions.  The bony lesions include the spine,  "right shoulder, right rib, sacrum, and right hip.  The 1.3 cm periumbilical subcutaneous nodule appears most amenable to biopsy.  Extensive periportal and periaortic lymphadenopathy with evidence of peritoneal metastasis for which PET CT was recommended.  9/18/2019 CT-guided fine-needle aspirate of nodule within the anterior abdominal wall \"atypical\" but unable to further typify.  9/26/2019 Dr. Kline initial visit.  Reviewed PET with patient and worrisome but nondiagnostic fine-needle aspirate.  Going to get sufficient tissue not only for diagnosis but for molecular testing that could include Biotheranostics to distinguish tissue type and 15 slides for Strata testing.  We will get our navigator on board as well.  She is having some nighttime sweats without fever though she does not describe them as bed drenching but this is new for her in the last month.  The range of possibilities here are broad and include lymphoma as well as a multiplicity of carcinomas but I need more than \"atypical\" cells on a fine-needle aspirate to ultimately decide our course.  We did talk about the likelihood that what ever we are up against it is unlikely to be curative but we will wait out her risk benefit ratio once we know the pathology.  Though they may ultimately not be helpful, while we are getting tissue I will go ahead and send off a barrage of tumor markers that might help guide us to the primary.    -10/7/2019 medical oncology office visit: Preliminary path report shows T-cell rich B-cell non-Hodgkin's lymphoma.  While awaiting final pathology we will get her port and echocardiogram and plan on treating her with Rituxan CHOP with Neulasta device for her stage IV high-grade lymphoma.  She has a high risk IPI score.  We will get her LDH and uric acid today.  Risks including renal dysfunction, nausea, neuropathy, pancytopenia, cardiac dysfunction, infusion reactions, etc. were discussed in detail in addition to additional side " effects as standard per all these medicines.  She knows that if this is the T-cell rich B-cell lymphoma that we treat this like a diffuse large B-cell lymphoma and that there is a possibility but not a guarantee of cure and it is not the majority in this setting but that plan and the statistics may change if the final pathology changes.    -10/8/2019 hepatitis B panel: Negative hepatitis B core antibody, negative hepatitis B surface antigen, reactive hepatitis B surface antibody.  Will need to monitor for any elevation of liver function studies during therapy.    -10/15/2019: Communication from Dr. Damian from [place] is that the final decision is that this is T-cell rich/histiocyte rich diffuse large B cell lymphoma.  Plan to proceed with R-CHOP with cooling cap.     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  Ejection fraction 58%.  Has cooling cap Education.  There is a international shortage of vincristine.  She will get vincristine with R-CHOP course #1 today but I am hesitant to just drop vincristine from all of her subsequent regimens but we will not have any to give her.  I plan to switch her regimen to EPOCHR but have removed the vincristine from the plan.  This will make her cooling cap a moot point since this is an 4-day infusional chemotherapy.  I will admit her on November 11 through hospitalists and plan for Neulasta shot as an outpatient on November 16.    -11/22/2019 through 11/25/2019 inpatient for MRSA line infection.  Port removed.    -12/2/19 hematology oncology office visit: Will finish 4 weeks of IV antibiotics with Dr. Alvarenga on approximately 12/23/2019.  Will get PET prior to then.  Has received 1 course of R-CHOP and 1 course of EPCHR.  Vincristine is now available and we will go back to R-CHOP for course #3 but we  need to finish IV antibiotics to ensure clearance of infection.  We will see her back 12/23/2019 for R-CHOP with PET prior to return.  We will plan on using the PICC line that currently is being used for her IV antibiotics.  Has noted some bubbles in her urine that I am not sure the significance.  No josiane evidence of fistula.    -12/23/2019 hematology oncology office visit: Prior bone and adenopathy abnormalities on PET resolved on images I reviewed and reports thereof.  Vincristine now available.  This is course #3 of treatment (R-CHOP today) we will rescan after 2 more courses but plan on a total of 6 courses given the stage IV presentation with bone involvement.  We will give 1 extra week to help her clear the infection thoroughly.  She stopped antibiotics 2 days ago intravenously.  Rescan every couple of courses.        Diffuse large B-cell lymphoma of lymph nodes of multiple regions (CMS/HCC)     Initial Diagnosis     Diffuse large B-cell lymphoma of lymph nodes of multiple regions (CMS/HCC)      9/11/2019 Imaging     PET/CT IMPRESSION:  There are too numerous to count hypermetabolic lesions as  described above involving multiple soft tissues and bony structures.  There is no prior exam for comparison. Although many sites are amenable  to percutaneous biopsy or excision, there is a periumbilical  subcutaneous nodule measuring 1.1 x 1.3 cm which would be the least  risky option.      10/21/2019 -  Chemotherapy     Course #1 R-CHOP 10/21/2019  Course #2 etoposide substitution for vincristine x1 due to vincristine shortage 11/11/2019-11/15/2019  Course #3 resumed R-CHOP with vincristine now available 12/30/2019   Course #4 R-CHOP 1/20/2020 11/22/2019 Adverse Reaction     Admitted to [facility] with fever in port site infection.  Treated on empiric antibiotics.  Port-A-Cath removed 11/23/2019, PICC line was placed.      12/13/2019 Imaging     PET is positive at the old port site but otherwise  "prior lymphadenopathy and bony abnormalities resolved          HISTORY OF PRESENT ILLNESS:  The patient is a 69 y.o. female, here for follow up on management of T-cell rich histiocyte rich B-cell lymphoma.  Overall has been doing well and tolerating therapy with R-CHOP.  When I saw her last few weeks ago she had developed a small red area on her left upper arm above her PICC line.  We did have her go back to see infectious disease as there was some concern with her history of MRSA infection.  Everything came back negative and she has had no further issues.  She does have some irritation of the skin under her breasts as she has not been wearing a bra.    Past Medical History:   Diagnosis Date   • Abnormal Pap smear of vagina     \"Abnormal pap smear\"   • Anxiety    • Cervical dysplasia    • Cystocele    • GERD (gastroesophageal reflux disease)     INTERMITTENT- NOT MEDICATION   • Migraine headache    • Osteopenia    • Transient global amnesia     fall 2015     Past Surgical History:   Procedure Laterality Date   • BREAST CYST ASPIRATION     • BREAST SURGERY      biopsy x 3   • CERVICAL BIOPSY      PT UNAWARE OF CERVIX BIOPSY   • IMPACTED THIRD MOLAR REMOVAL      wisdom teeth extracted in which two partially impacted   • OTHER SURGICAL HISTORY  10/02/2019    surgical bx of abd wall tumor   • PERIPHERALLY INSERTED CENTRAL CATHETER INSERTION Left 11/2019   • TONSILLECTOMY     • VAGINAL HYSTERECTOMY      with anterior repair   • VAGINAL HYSTERECTOMY W/ ANTERIOR AND POSTERIOR VAGINAL REPAIR N/A 11/15/2016    Procedure: VAGINAL HYSTERECTOMY WITH ANTERIOR VAGINAL REPAIR;  Surgeon: Henry Pitt MD;  Location:  [facility] OR;  Service:    • VENOUS ACCESS DEVICE (PORT) REMOVAL N/A 11/23/2019    Procedure: REMOVAL VENOUS ACCESS DEVICE;  Surgeon: Primo Swenson MD;  Location:  [facility] OR;  Service: General       Allergies   Allergen Reactions   • Betadine [Povidone Iodine]    • Barium-Containing Compounds Unknown (See " "Comments)     unknown   • Codeine    • Contrast Dye Unknown (See Comments)     Unknown   • E-Mycin [Erythromycin]    • Epinephrine    • Iodine    • Other      MSG, Rema, Preservatives in eye ointment   • Procaine Other (See Comments)     DENTAL ANASTHESIA CAUSED HEART RACING- YRS AGO\"   • Sudafed [Pseudoephedrine Hcl]        Family History and Social History reviewed and changed as necessary      REVIEW OF SYSTEM:   Review of Systems   Constitutional: Negative for appetite change, chills, diaphoresis, fever and unexpected weight change. Positive for fatigue.  HENT:   Negative for mouth sores, sore throat and trouble swallowing.    Eyes: Negative for icterus.   Respiratory: Negative for cough, hemoptysis and shortness of breath.    Cardiovascular: Negative for chest pain, leg swelling and palpitations.   Gastrointestinal: Negative for abdominal distention, abdominal pain, blood in stool, constipation, diarrhea, nausea and vomiting.   Endocrine: Negative for hot flashes.   Genitourinary: Negative for bladder incontinence, difficulty urinating, dysuria, frequency and hematuria.    Musculoskeletal: Negative for gait problem, neck pain and neck stiffness.   Skin: Positive for rash under her breasts. Right upper chest wall is almost completely healed from previous port removal due to MRSA infection.  Neurological: Negative for dizziness, gait problem, headaches, light-headedness and numbness.   Hematological: Negative for adenopathy. Does not bruise/bleed easily.   Psychiatric/Behavioral: Negative for depression. The patient is not nervous/anxious.    All other systems reviewed and are negative.       PHYSICAL EXAM    Vitals:    01/20/20 0909   BP: 125/62   Pulse: 84   Resp: 18   Temp: 98.7 °F (37.1 °C)   SpO2: 98%   Weight: 73 kg (161 lb)   Height: 157.5 cm (62\")     Constitutional: Appears well-developed and well-nourished. No distress.   ECOG: (1) Restricted in physically strenuous activity, ambulatory and able to do " work of light nature  HENT:   Head: Normocephalic.   Mouth/Throat: Oropharynx is clear and moist.   Eyes: Conjunctivae are normal. Pupils are equal, round, and reactive to light. No scleral icterus.   Neck: Neck supple. No JVD present. No thyromegaly present.   Cardiovascular: Normal rate, regular rhythm and normal heart sounds.    Pulmonary/Chest: Breath sounds normal. No respiratory distress.   Abdominal: Soft. Exhibits no distension and no mass. There is no hepatosplenomegaly. There is no tenderness. There is no rebound and no guarding.   Musculoskeletal:Exhibits no edema, tenderness or deformity.   Neurological: Alert and oriented to person, place, and time. Exhibits normal muscle tone.   Skin: Mild, erythemic papular rash under both breasts, left greater than right. Right upper chest wall at site of previous port is well-healed, the area is moist with antibiotic ointment, no erythema or drainage otherwise noted.  Psychiatric: Normal mood and affect.   Vitals reviewed.  Labs reviewed.    Lab Results   Component Value Date    HGB 11.8 (L) 12/27/2019    HCT 35.7 12/27/2019    MCV 97.3 (H) 12/27/2019     12/27/2019    WBC 4.97 12/27/2019    NEUTROABS 3.20 12/27/2019    LYMPHSABS 1.00 12/27/2019    MONOSABS 0.48 12/27/2019    EOSABS 0.21 12/27/2019    BASOSABS 0.07 12/27/2019       Lab Results   Component Value Date    GLUCOSE 99 12/27/2019    BUN 22 12/27/2019    CREATININE 0.54 (L) 12/27/2019     12/27/2019    K 4.1 12/27/2019     12/27/2019    CO2 26.0 12/27/2019    CALCIUM 9.3 12/27/2019    PROTEINTOT 6.0 12/27/2019    ALBUMIN 3.90 12/27/2019    BILITOT 0.3 12/27/2019    ALKPHOS 75 12/27/2019    AST 19 12/27/2019    ALT 15 12/27/2019         ASSESSMENT & PLAN:    1. 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 Intertrigo under the breasts  3. History of port related infection, resolved, now with PICC line    Discussion: Back on therapy with  R-CHOP she is tolerating it overall quite well.  Has had no infections or illnesses since we saw her last.  She did have a small red area on her left upper arm that we had her return to infectious disease for evaluation, work-up was negative and it has since resolved.  She does have intertrigo under her breast, she will continue to use powders to keep this dry, she also has antifungal cream already at home and I recommended she use this.  She will let us know if it does not improve.  Otherwise we will continue today with her fourth cycle of therapy with R-CHOP.  I will repeat her PET/CT for reevaluation after this fourth course, I have ordered that today.  Assuming she still has an excellent response, previous PET showed complete response other than for site of port removal, then we plan on a total of 6 courses with PET scan again after the sixth course.  We will see her back in 3 weeks for follow-up.    I spent a total of 25 minutes in direct patient care, greater than 15  minutes (greater than 50%) were spent in coordination of care, and counseling the patient regarding  (diagnosis) . Answered any questions patient had regarding medications and plan of care.     Manju Schulz, APRN    01/20/2020

## 2020-01-22 ENCOUNTER — TELEPHONE (OUTPATIENT)
Dept: ONCOLOGY | Facility: CLINIC | Age: 70
End: 2020-01-22

## 2020-01-22 NOTE — TELEPHONE ENCOUNTER
Discussed with COLLEEN Palacio.  ASA ok for oncology standpoint.  Patient notified.  Verbalized understanding.  She states no one advised her to stop, she just wanted to make sure it was ok.

## 2020-01-22 NOTE — TELEPHONE ENCOUNTER
Pt wants to know if Dr Kline thinks its ok for her to start taking a baby Asprin again     Pt contact # [phone]

## 2020-01-27 ENCOUNTER — HOSPITAL ENCOUNTER (OUTPATIENT)
Dept: ONCOLOGY | Facility: HOSPITAL | Age: 70
Setting detail: INFUSION SERIES
Discharge: HOME | End: 2020-01-27

## 2020-01-27 VITALS
TEMPERATURE: 97.2 F | BODY MASS INDEX: 29.44 KG/M2 | HEIGHT: 62 IN | SYSTOLIC BLOOD PRESSURE: 117 MMHG | HEART RATE: 118 BPM | WEIGHT: 160 LBS | RESPIRATION RATE: 16 BRPM | DIASTOLIC BLOOD PRESSURE: 72 MMHG

## 2020-01-27 DIAGNOSIS — Z45.2 ENCOUNTER FOR ADJUSTMENT OR MANAGEMENT OF VASCULAR ACCESS DEVICE: Primary | ICD-10-CM

## 2020-01-27 PROCEDURE — G0463 HOSPITAL OUTPT CLINIC VISIT: HCPCS

## 2020-01-27 RX ORDER — HEPARIN SODIUM (PORCINE) LOCK FLUSH IV SOLN 100 UNIT/ML 100 UNIT/ML
500 SOLUTION INTRAVENOUS AS NEEDED
Status: DISCONTINUED | OUTPATIENT
Start: 2020-01-27 | End: 2020-01-28 | Stop reason: HOSPADM

## 2020-01-27 RX ORDER — SODIUM CHLORIDE 0.9 % (FLUSH) 0.9 %
10 SYRINGE (ML) INJECTION AS NEEDED
Status: CANCELLED | OUTPATIENT
Start: 2020-01-27

## 2020-01-27 RX ORDER — HEPARIN SODIUM (PORCINE) LOCK FLUSH IV SOLN 100 UNIT/ML 100 UNIT/ML
500 SOLUTION INTRAVENOUS AS NEEDED
Status: CANCELLED | OUTPATIENT
Start: 2020-01-27

## 2020-01-27 RX ORDER — SODIUM CHLORIDE 0.9 % (FLUSH) 0.9 %
10 SYRINGE (ML) INJECTION AS NEEDED
Status: DISCONTINUED | OUTPATIENT
Start: 2020-01-27 | End: 2020-01-28 | Stop reason: HOSPADM

## 2020-02-03 ENCOUNTER — HOSPITAL ENCOUNTER (OUTPATIENT)
Dept: PET IMAGING | Facility: HOSPITAL | Age: 70
Discharge: HOME | End: 2020-02-03
Admitting: NURSE PRACTITIONER

## 2020-02-03 ENCOUNTER — HOSPITAL ENCOUNTER (OUTPATIENT)
Dept: ONCOLOGY | Facility: HOSPITAL | Age: 70
Setting detail: INFUSION SERIES
Discharge: HOME | End: 2020-02-03

## 2020-02-03 ENCOUNTER — APPOINTMENT (OUTPATIENT)
Dept: LAB | Facility: HOSPITAL | Age: 70
End: 2020-02-03

## 2020-02-03 ENCOUNTER — HOSPITAL ENCOUNTER (OUTPATIENT)
Dept: PET IMAGING | Facility: HOSPITAL | Age: 70
Discharge: HOME | End: 2020-02-03

## 2020-02-03 ENCOUNTER — LAB (OUTPATIENT)
Dept: LAB | Facility: HOSPITAL | Age: 70
End: 2020-02-03

## 2020-02-03 VITALS
TEMPERATURE: 98.4 F | HEART RATE: 94 BPM | WEIGHT: 158 LBS | BODY MASS INDEX: 29.08 KG/M2 | RESPIRATION RATE: 16 BRPM | HEIGHT: 62 IN | DIASTOLIC BLOOD PRESSURE: 79 MMHG | SYSTOLIC BLOOD PRESSURE: 117 MMHG

## 2020-02-03 DIAGNOSIS — C83.38 DIFFUSE LARGE B-CELL LYMPHOMA OF LYMPH NODES OF MULTIPLE REGIONS (HCC): ICD-10-CM

## 2020-02-03 DIAGNOSIS — C83.38 DIFFUSE LARGE B-CELL LYMPHOMA OF LYMPH NODES OF MULTIPLE REGIONS (HCC): Chronic | ICD-10-CM

## 2020-02-03 LAB
ALBUMIN SERPL-MCNC: 3.9 G/DL (ref 3.5–5.2)
ALBUMIN/GLOB SERPL: 1.7 G/DL
ALP SERPL-CCNC: 87 U/L (ref 39–117)
ALT SERPL W P-5'-P-CCNC: 11 U/L (ref 1–33)
ANION GAP SERPL CALCULATED.3IONS-SCNC: 11 MMOL/L (ref 5–15)
AST SERPL-CCNC: 20 U/L (ref 1–32)
BASOPHILS # BLD AUTO: 0.07 10*3/MM3 (ref 0–0.2)
BASOPHILS NFR BLD AUTO: 1 % (ref 0–1.5)
BILIRUB SERPL-MCNC: 0.2 MG/DL (ref 0.2–1.2)
BUN BLD-MCNC: 20 MG/DL (ref 8–23)
BUN/CREAT SERPL: 32.8 (ref 7–25)
CALCIUM SPEC-SCNC: 8.9 MG/DL (ref 8.6–10.5)
CHLORIDE SERPL-SCNC: 106 MMOL/L (ref 98–107)
CO2 SERPL-SCNC: 23 MMOL/L (ref 22–29)
CREAT BLD-MCNC: 0.61 MG/DL (ref 0.57–1)
DEPRECATED RDW RBC AUTO: 49.7 FL (ref 37–54)
EOSINOPHIL # BLD AUTO: 0.06 10*3/MM3 (ref 0–0.4)
EOSINOPHIL NFR BLD AUTO: 0.8 % (ref 0.3–6.2)
ERYTHROCYTE [DISTWIDTH] IN BLOOD BY AUTOMATED COUNT: 13.9 % (ref 12.3–15.4)
GFR SERPL CREATININE-BSD FRML MDRD: 97 ML/MIN/1.73
GLOBULIN UR ELPH-MCNC: 2.3 GM/DL
GLUCOSE BLD-MCNC: 87 MG/DL (ref 65–99)
GLUCOSE BLDC GLUCOMTR-MCNC: 97 MG/DL (ref 70–130)
HCT VFR BLD AUTO: 36.1 % (ref 34–46.6)
HGB BLD-MCNC: 11.5 G/DL (ref 12–15.9)
IMM GRANULOCYTES # BLD AUTO: 0.28 10*3/MM3 (ref 0–0.05)
IMM GRANULOCYTES NFR BLD AUTO: 4 % (ref 0–0.5)
LYMPHOCYTES # BLD AUTO: 1.07 10*3/MM3 (ref 0.7–3.1)
LYMPHOCYTES NFR BLD AUTO: 15.2 % (ref 19.6–45.3)
MCH RBC QN AUTO: 31.6 PG (ref 26.6–33)
MCHC RBC AUTO-ENTMCNC: 31.9 G/DL (ref 31.5–35.7)
MCV RBC AUTO: 99.2 FL (ref 79–97)
MONOCYTES # BLD AUTO: 0.62 10*3/MM3 (ref 0.1–0.9)
MONOCYTES NFR BLD AUTO: 8.8 % (ref 5–12)
NEUTROPHILS # BLD AUTO: 4.96 10*3/MM3 (ref 1.7–7)
NEUTROPHILS NFR BLD AUTO: 70.2 % (ref 42.7–76)
NRBC BLD AUTO-RTO: 0 /100 WBC (ref 0–0.2)
PLATELET # BLD AUTO: 148 10*3/MM3 (ref 140–450)
PMV BLD AUTO: 10.3 FL (ref 6–12)
POTASSIUM BLD-SCNC: 4.5 MMOL/L (ref 3.5–5.2)
PROT SERPL-MCNC: 6.2 G/DL (ref 6–8.5)
RBC # BLD AUTO: 3.64 10*6/MM3 (ref 3.77–5.28)
SODIUM BLD-SCNC: 140 MMOL/L (ref 136–145)
WBC NRBC COR # BLD: 7.06 10*3/MM3 (ref 3.4–10.8)

## 2020-02-03 PROCEDURE — 0 FLUDEOXYGLUCOSE F18 SOLUTION: Performed by: NURSE PRACTITIONER

## 2020-02-03 PROCEDURE — A9552 F18 FDG: HCPCS | Performed by: NURSE PRACTITIONER

## 2020-02-03 PROCEDURE — 80053 COMPREHEN METABOLIC PANEL: CPT

## 2020-02-03 PROCEDURE — 85025 COMPLETE CBC W/AUTO DIFF WBC: CPT

## 2020-02-03 PROCEDURE — 78815 PET IMAGE W/CT SKULL-THIGH: CPT

## 2020-02-03 PROCEDURE — 36415 COLL VENOUS BLD VENIPUNCTURE: CPT

## 2020-02-03 PROCEDURE — G0463 HOSPITAL OUTPT CLINIC VISIT: HCPCS

## 2020-02-03 PROCEDURE — 82962 GLUCOSE BLOOD TEST: CPT

## 2020-02-03 RX ADMIN — FLUDEOXYGLUCOSE F18 1 DOSE: 300 INJECTION INTRAVENOUS at 11:26

## 2020-02-08 LAB — FUNGUS WND CULT: NORMAL

## 2020-02-10 ENCOUNTER — HOSPITAL ENCOUNTER (OUTPATIENT)
Dept: ONCOLOGY | Facility: HOSPITAL | Age: 70
Setting detail: INFUSION SERIES
Discharge: HOME | End: 2020-02-10

## 2020-02-10 ENCOUNTER — OFFICE VISIT (OUTPATIENT)
Dept: ONCOLOGY | Facility: CLINIC | Age: 70
End: 2020-02-10

## 2020-02-10 VITALS
HEIGHT: 62 IN | TEMPERATURE: 97.8 F | BODY MASS INDEX: 29.81 KG/M2 | SYSTOLIC BLOOD PRESSURE: 137 MMHG | HEART RATE: 91 BPM | RESPIRATION RATE: 18 BRPM | DIASTOLIC BLOOD PRESSURE: 62 MMHG | WEIGHT: 162 LBS | OXYGEN SATURATION: 97 %

## 2020-02-10 VITALS — HEART RATE: 66 BPM | SYSTOLIC BLOOD PRESSURE: 121 MMHG | DIASTOLIC BLOOD PRESSURE: 55 MMHG

## 2020-02-10 DIAGNOSIS — C83.38 DIFFUSE LARGE B-CELL LYMPHOMA OF LYMPH NODES OF MULTIPLE REGIONS (HCC): ICD-10-CM

## 2020-02-10 DIAGNOSIS — C83.38 DIFFUSE LARGE B-CELL LYMPHOMA OF LYMPH NODES OF MULTIPLE REGIONS (HCC): Chronic | ICD-10-CM

## 2020-02-10 DIAGNOSIS — C78.7 LIVER METASTASES: Primary | Chronic | ICD-10-CM

## 2020-02-10 DIAGNOSIS — C83.38 DIFFUSE LARGE B-CELL LYMPHOMA OF LYMPH NODES OF MULTIPLE REGIONS (HCC): Primary | ICD-10-CM

## 2020-02-10 LAB
ALBUMIN SERPL-MCNC: 3.9 G/DL (ref 3.5–5.2)
ALBUMIN/GLOB SERPL: 2 G/DL
ALP SERPL-CCNC: 73 U/L (ref 39–117)
ALT SERPL W P-5'-P-CCNC: 12 U/L (ref 1–33)
ANION GAP SERPL CALCULATED.3IONS-SCNC: 10 MMOL/L (ref 5–15)
AST SERPL-CCNC: 16 U/L (ref 1–32)
BILIRUB SERPL-MCNC: 0.3 MG/DL (ref 0.2–1.2)
BUN BLD-MCNC: 22 MG/DL (ref 8–23)
BUN/CREAT SERPL: 34.9 (ref 7–25)
CALCIUM SPEC-SCNC: 9.2 MG/DL (ref 8.6–10.5)
CHLORIDE SERPL-SCNC: 107 MMOL/L (ref 98–107)
CO2 SERPL-SCNC: 27 MMOL/L (ref 22–29)
CREAT BLD-MCNC: 0.63 MG/DL (ref 0.57–1)
ERYTHROCYTE [DISTWIDTH] IN BLOOD BY AUTOMATED COUNT: 15.7 % (ref 12.3–15.4)
GFR SERPL CREATININE-BSD FRML MDRD: 93 ML/MIN/1.73
GLOBULIN UR ELPH-MCNC: 2 GM/DL
GLUCOSE BLD-MCNC: 98 MG/DL (ref 65–99)
HCT VFR BLD AUTO: 34 % (ref 34–46.6)
HGB BLD-MCNC: 11.3 G/DL (ref 12–15.9)
LYMPHOCYTES # BLD AUTO: 0.8 10*3/MM3 (ref 0.7–3.1)
LYMPHOCYTES NFR BLD AUTO: 17.2 % (ref 19.6–45.3)
MCH RBC QN AUTO: 31.4 PG (ref 26.6–33)
MCHC RBC AUTO-ENTMCNC: 33.1 G/DL (ref 31.5–35.7)
MCV RBC AUTO: 94.6 FL (ref 79–97)
MONOCYTES # BLD AUTO: 0.3 10*3/MM3 (ref 0.1–0.9)
MONOCYTES NFR BLD AUTO: 6.6 % (ref 5–12)
NEUTROPHILS # BLD AUTO: 3.7 10*3/MM3 (ref 1.7–7)
NEUTROPHILS NFR BLD AUTO: 76.2 % (ref 42.7–76)
PLATELET # BLD AUTO: 212 10*3/MM3 (ref 140–450)
PMV BLD AUTO: 6.2 FL (ref 6–12)
POTASSIUM BLD-SCNC: 4 MMOL/L (ref 3.5–5.2)
PROT SERPL-MCNC: 5.9 G/DL (ref 6–8.5)
RBC # BLD AUTO: 3.6 10*6/MM3 (ref 3.77–5.28)
SODIUM BLD-SCNC: 144 MMOL/L (ref 136–145)
WBC NRBC COR # BLD: 4.8 10*3/MM3 (ref 3.4–10.8)

## 2020-02-10 PROCEDURE — 25010000002 PEGFILGRASTIM 6 MG/0.6ML PREFILLED SYRINGE KIT: Performed by: INTERNAL MEDICINE

## 2020-02-10 PROCEDURE — 80053 COMPREHEN METABOLIC PANEL: CPT | Performed by: INTERNAL MEDICINE

## 2020-02-10 PROCEDURE — 99214 OFFICE O/P EST MOD 30 MIN: CPT | Performed by: INTERNAL MEDICINE

## 2020-02-10 PROCEDURE — 36415 COLL VENOUS BLD VENIPUNCTURE: CPT

## 2020-02-10 PROCEDURE — 96377 APPLICATON ON-BODY INJECTOR: CPT

## 2020-02-10 PROCEDURE — 25010000002 PALONOSETRON 0.25 MG/5ML SOLUTION PREFILLED SYRINGE: Performed by: INTERNAL MEDICINE

## 2020-02-10 PROCEDURE — 96367 TX/PROPH/DG ADDL SEQ IV INF: CPT

## 2020-02-10 PROCEDURE — 25010000002 DIPHENHYDRAMINE PER 50 MG: Performed by: INTERNAL MEDICINE

## 2020-02-10 PROCEDURE — 25010000002 VINCRISTINE PER 1 MG: Performed by: INTERNAL MEDICINE

## 2020-02-10 PROCEDURE — 96413 CHEMO IV INFUSION 1 HR: CPT

## 2020-02-10 PROCEDURE — 85025 COMPLETE CBC W/AUTO DIFF WBC: CPT | Performed by: INTERNAL MEDICINE

## 2020-02-10 PROCEDURE — 25010000002 FOSAPREPITANT PER 1 MG: Performed by: INTERNAL MEDICINE

## 2020-02-10 PROCEDURE — 25010000002 RITUXIMAB 10 MG/ML SOLUTION 50 ML VIAL: Performed by: INTERNAL MEDICINE

## 2020-02-10 PROCEDURE — 96375 TX/PRO/DX INJ NEW DRUG ADDON: CPT

## 2020-02-10 PROCEDURE — 25010000002 RITUXIMAB 10 MG/ML SOLUTION 10 ML VIAL: Performed by: INTERNAL MEDICINE

## 2020-02-10 PROCEDURE — 96417 CHEMO IV INFUS EACH ADDL SEQ: CPT

## 2020-02-10 PROCEDURE — 25010000002 DOXORUBICIN PER 10 MG: Performed by: INTERNAL MEDICINE

## 2020-02-10 PROCEDURE — 96415 CHEMO IV INFUSION ADDL HR: CPT

## 2020-02-10 PROCEDURE — 25010000002 CYCLOPHOSPHAMIDE PER 100 MG: Performed by: INTERNAL MEDICINE

## 2020-02-10 PROCEDURE — 25010000002 DEXAMETHASONE PER 1 MG: Performed by: INTERNAL MEDICINE

## 2020-02-10 PROCEDURE — 96411 CHEMO IV PUSH ADDL DRUG: CPT

## 2020-02-10 RX ORDER — ACETAMINOPHEN 325 MG/1
650 TABLET ORAL ONCE
Status: COMPLETED | OUTPATIENT
Start: 2020-02-10 | End: 2020-02-10

## 2020-02-10 RX ORDER — ACETAMINOPHEN 325 MG/1
650 TABLET ORAL ONCE
Status: CANCELLED | OUTPATIENT
Start: 2020-02-10

## 2020-02-10 RX ORDER — SODIUM CHLORIDE 9 MG/ML
250 INJECTION, SOLUTION INTRAVENOUS ONCE
Status: COMPLETED | OUTPATIENT
Start: 2020-02-10 | End: 2020-02-10

## 2020-02-10 RX ORDER — POLYETHYLENE GLYCOL 3350 17 G/17G
17 POWDER, FOR SOLUTION ORAL AS NEEDED
COMMUNITY

## 2020-02-10 RX ORDER — DOXORUBICIN HYDROCHLORIDE 2 MG/ML
50 INJECTION, SOLUTION INTRAVENOUS ONCE
Status: CANCELLED | OUTPATIENT
Start: 2020-02-10

## 2020-02-10 RX ORDER — DOXORUBICIN HYDROCHLORIDE 2 MG/ML
50 INJECTION, SOLUTION INTRAVENOUS ONCE
Status: COMPLETED | OUTPATIENT
Start: 2020-02-10 | End: 2020-02-10

## 2020-02-10 RX ORDER — PALONOSETRON 0.05 MG/ML
0.25 INJECTION, SOLUTION INTRAVENOUS ONCE
Status: CANCELLED | OUTPATIENT
Start: 2020-02-10

## 2020-02-10 RX ORDER — DIPHENHYDRAMINE HYDROCHLORIDE 50 MG/ML
50 INJECTION INTRAMUSCULAR; INTRAVENOUS AS NEEDED
Status: CANCELLED | OUTPATIENT
Start: 2020-02-10

## 2020-02-10 RX ORDER — FAMOTIDINE 10 MG/ML
20 INJECTION, SOLUTION INTRAVENOUS AS NEEDED
Status: CANCELLED | OUTPATIENT
Start: 2020-02-10

## 2020-02-10 RX ORDER — PALONOSETRON 0.05 MG/ML
0.25 INJECTION, SOLUTION INTRAVENOUS ONCE
Status: COMPLETED | OUTPATIENT
Start: 2020-02-10 | End: 2020-02-10

## 2020-02-10 RX ORDER — MEPERIDINE HYDROCHLORIDE 50 MG/ML
25 INJECTION INTRAMUSCULAR; INTRAVENOUS; SUBCUTANEOUS
Status: CANCELLED | OUTPATIENT
Start: 2020-02-10

## 2020-02-10 RX ORDER — SODIUM CHLORIDE 9 MG/ML
250 INJECTION, SOLUTION INTRAVENOUS ONCE
Status: CANCELLED | OUTPATIENT
Start: 2020-02-10

## 2020-02-10 RX ADMIN — SODIUM CHLORIDE 150 MG: 9 INJECTION, SOLUTION INTRAVENOUS at 11:38

## 2020-02-10 RX ADMIN — RITUXIMAB 600 MG: 10 INJECTION, SOLUTION INTRAVENOUS at 10:05

## 2020-02-10 RX ADMIN — PEGFILGRASTIM 6 MG: KIT SUBCUTANEOUS at 12:59

## 2020-02-10 RX ADMIN — DIPHENHYDRAMINE HYDROCHLORIDE 50 MG: 50 INJECTION INTRAMUSCULAR; INTRAVENOUS at 09:39

## 2020-02-10 RX ADMIN — CYCLOPHOSPHAMIDE 1270 MG: 1 INJECTION, POWDER, FOR SOLUTION INTRAVENOUS; ORAL at 12:27

## 2020-02-10 RX ADMIN — PALONOSETRON 0.25 MG: 0.25 INJECTION, SOLUTION INTRAVENOUS at 11:38

## 2020-02-10 RX ADMIN — DOXORUBICIN HYDROCHLORIDE 84 MG: 2 INJECTION, SOLUTION INTRAVENOUS at 12:04

## 2020-02-10 RX ADMIN — DEXAMETHASONE SODIUM PHOSPHATE 12 MG: 4 INJECTION, SOLUTION INTRAMUSCULAR; INTRAVENOUS at 11:38

## 2020-02-10 RX ADMIN — ACETAMINOPHEN 650 MG: 325 TABLET ORAL at 09:39

## 2020-02-10 RX ADMIN — VINCRISTINE SULFATE 2 MG: 1 INJECTION, SOLUTION INTRAVENOUS at 12:12

## 2020-02-10 RX ADMIN — SODIUM CHLORIDE 250 ML: 9 INJECTION, SOLUTION INTRAVENOUS at 09:39

## 2020-02-10 NOTE — PROGRESS NOTES
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 History of benign transvaginal hysterectomy 1/1/2016  2. History of reflux  3. History of transient global amnesia  4. IV dye allergy as well as allergy to barium enema  5. 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  6/8/2016 colonoscopy benign  1/2019 apparently had CT screening of the chest that showed a couple of liver abnormalities (do not have those reports).  Unable to do with contrast and IV dye allergy.  Asymptomatic.  9/5/2019 CT of the abdomen without contrast shows at least 2 hepatic lesions, 4 cm in the right lobe of the liver and 2 cm in the left lobe of the liver.  9/11/2019 PET scan shows too numerous to count increased hypermetabolism in the anterior mediastinal, subcarinal, right lower lobe, head of the pancreas, esophagus, adjacent paraesophageal lymph nodes, liver, splenic hilum, left and right adrenal glands.  Also uptake in the mesentery, right retroperitoneum, right iliac chain, periumbilical subcutaneous fat, vaginal stump, and numerous bony lesions.  The bony lesions include the  "spine, right shoulder, right rib, sacrum, and right hip.  The 1.3 cm periumbilical subcutaneous nodule appears most amenable to biopsy.  Extensive periportal and periaortic lymphadenopathy with evidence of peritoneal metastasis for which PET CT was recommended.  9/18/2019 CT-guided fine-needle aspirate of nodule within the anterior abdominal wall \"atypical\" but unable to further typify.  9/26/2019 Dr. Kline initial visit.  Reviewed PET with patient and worrisome but nondiagnostic fine-needle aspirate.  Going to get sufficient tissue not only for diagnosis but for molecular testing that could include Biotheranostics to distinguish tissue type and 15 slides for Strata testing.  We will get our navigator on board as well.  She is having some nighttime sweats without fever though she does not describe them as bed drenching but this is new for her in the last month.  The range of possibilities here are broad and include lymphoma as well as a multiplicity of carcinomas but I need more than \"atypical\" cells on a fine-needle aspirate to ultimately decide our course.  We did talk about the likelihood that what ever we are up against it is unlikely to be curative but we will wait out her risk benefit ratio once we know the pathology.  Though they may ultimately not be helpful, while we are getting tissue I will go ahead and send off a barrage of tumor markers that might help guide us to the primary.    -10/7/2019 medical oncology office visit: Preliminary path report shows T-cell rich B-cell non-Hodgkin's lymphoma.  While awaiting final pathology we will get her port and echocardiogram and plan on treating her with Rituxan CHOP with Neulasta device for her stage IV high-grade lymphoma.  She has a high risk IPI score.  We will get her LDH and uric acid today.  Risks including renal dysfunction, nausea, neuropathy, pancytopenia, cardiac dysfunction, infusion reactions, etc. were discussed in detail in addition to additional " side effects as standard per all these medicines.  She knows that if this is the T-cell rich B-cell lymphoma that we treat this like a diffuse large B-cell lymphoma and that there is a possibility but not a guarantee of cure and it is not the majority in this setting but that plan and the statistics may change if the final pathology changes.    -10/8/2019 hepatitis B panel: Negative hepatitis B core antibody, negative hepatitis B surface antigen, reactive hepatitis B surface antibody.  Will need to monitor for any elevation of liver function studies during therapy.    -10/15/2019: Communication from Dr. Damian from [place] is that the final decision is that this is T-cell rich/histiocyte rich diffuse large B cell lymphoma.  Plan to proceed with R-CHOP with cooling cap.     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  Ejection fraction 58%.  Has cooling cap Education.  There is a international shortage of vincristine.  She will get vincristine with R-CHOP course #1 today but I am hesitant to just drop vincristine from all of her subsequent regimens but we will not have any to give her.  I plan to switch her regimen to EPOCHR but have removed the vincristine from the plan.  This will make her cooling cap a moot point since this is an 4-day infusional chemotherapy.  I will admit her on November 11 through hospitalists and plan for Neulasta shot as an outpatient on November 16.    -11/22/2019 through 11/25/2019 inpatient for MRSA line infection.  Port removed.    -12/2/19 hematology oncology office visit: Will finish 4 weeks of IV antibiotics with Dr. Alvarenga on approximately 12/23/2019.  Will get PET prior to then.  Has received 1 course of R-CHOP and 1 course of EPCHR.  Vincristine is now available and we will go back to R-CHOP for course #3 but  we need to finish IV antibiotics to ensure clearance of infection.  We will see her back 12/23/2019 for R-CHOP with PET prior to return.  We will plan on using the PICC line that currently is being used for her IV antibiotics.  Has noted some bubbles in her urine that I am not sure the significance.  No josiane evidence of fistula.    -12/23/2019 hematology oncology office visit: Prior bone and adenopathy abnormalities on PET resolved on images I reviewed and reports thereof.  Vincristine now available.  This is course #3 of treatment (R-CHOP today) we will rescan after 2 more courses but plan on a total of 6 courses given the stage IV presentation with bone involvement.  We will give 1 extra week to help her clear the infection thoroughly.  She stopped antibiotics 2 days ago intravenously.  Rescan every couple of courses.    -2/3/2020 PET negative        Diffuse large B-cell lymphoma of lymph nodes of multiple regions (CMS/HCC)     Initial Diagnosis     Diffuse large B-cell lymphoma of lymph nodes of multiple regions (CMS/HCC)      9/11/2019 Imaging     PET/CT IMPRESSION:  There are too numerous to count hypermetabolic lesions as  described above involving multiple soft tissues and bony structures.  There is no prior exam for comparison. Although many sites are amenable  to percutaneous biopsy or excision, there is a periumbilical  subcutaneous nodule measuring 1.1 x 1.3 cm which would be the least  risky option.      10/21/2019 -  Chemotherapy     Course #1 R-CHOP 10/21/2019  Course #2 etoposide substitution for vincristine x1 due to vincristine shortage 11/11/2019-11/15/2019  Course #3 resumed R-CHOP with vincristine now available 12/30/2019   Course #4 R-CHOP 1/20/2020 11/22/2019 Adverse Reaction     Admitted to [facility] with fever in port site infection.  Treated on empiric antibiotics.  Port-A-Cath removed 11/23/2019, PICC line was placed.      12/13/2019 Imaging     PET is positive at the  "old port site but otherwise prior lymphadenopathy and bony abnormalities resolved       2/3/2020 Imaging     2/3/2020 PET negative         HISTORY OF PRESENT ILLNESS:  The patient is a 69 y.o. female, here for follow up on management of T-cell rich histiocyte rich B-cell lymphoma.      Past Medical History:   Diagnosis Date   • Abnormal Pap smear of vagina     \"Abnormal pap smear\"   • Anxiety    • Cervical dysplasia    • Cystocele    • GERD (gastroesophageal reflux disease)     INTERMITTENT- NOT MEDICATION   • Migraine headache    • Osteopenia    • Transient global amnesia     fall 2015     Past Surgical History:   Procedure Laterality Date   • BREAST CYST ASPIRATION     • BREAST SURGERY      biopsy x 3   • CERVICAL BIOPSY      PT UNAWARE OF CERVIX BIOPSY   • IMPACTED THIRD MOLAR REMOVAL      wisdom teeth extracted in which two partially impacted   • OTHER SURGICAL HISTORY  10/02/2019    surgical bx of abd wall tumor   • PERIPHERALLY INSERTED CENTRAL CATHETER INSERTION Left 11/2019   • TONSILLECTOMY     • VAGINAL HYSTERECTOMY      with anterior repair   • VAGINAL HYSTERECTOMY W/ ANTERIOR AND POSTERIOR VAGINAL REPAIR N/A 11/15/2016    Procedure: VAGINAL HYSTERECTOMY WITH ANTERIOR VAGINAL REPAIR;  Surgeon: Henry Pitt MD;  Location:  [facility] OR;  Service:    • VENOUS ACCESS DEVICE (PORT) REMOVAL N/A 11/23/2019    Procedure: REMOVAL VENOUS ACCESS DEVICE;  Surgeon: Primo Swenson MD;  Location:  [facility] OR;  Service: General       Allergies   Allergen Reactions   • Betadine [Povidone Iodine]    • Barium-Containing Compounds Unknown (See Comments)     unknown   • Codeine    • Contrast Dye Angioedema     Unknown   • E-Mycin [Erythromycin]    • Epinephrine    • Iodine    • Other      MSG, Rema, Preservatives in eye ointment   • Procaine Other (See Comments)     DENTAL ANASTHESIA CAUSED HEART RACING- YRS AGO\"   • Sudafed [Pseudoephedrine Hcl] Palpitations       Family History and Social History reviewed and changed " "as necessary      REVIEW OF SYSTEM:   Review of Systems   Constitutional: Negative for appetite change, chills, diaphoresis, fatigue, fever and unexpected weight change.   HENT:   Negative for mouth sores, sore throat and trouble swallowing.    Eyes: Negative for icterus.   Respiratory: Negative for cough, hemoptysis and shortness of breath.    Cardiovascular: Negative for chest pain, leg swelling and palpitations.   Gastrointestinal: Negative for abdominal distention, abdominal pain, blood in stool, constipation, diarrhea, nausea and vomiting.   Endocrine: Negative for hot flashes.   Genitourinary: Negative for bladder incontinence, difficulty urinating, dysuria, frequency and hematuria.    Musculoskeletal: Negative for gait problem, neck pain and neck stiffness.   Skin: Negative for rash.   Neurological: Negative for dizziness, gait problem, headaches, light-headedness and numbness.   Hematological: Negative for adenopathy. Does not bruise/bleed easily.   Psychiatric/Behavioral: Negative for depression. The patient is not nervous/anxious.    All other systems reviewed and are negative.       PHYSICAL EXAM    Vitals:    02/10/20 0825   BP: 137/62   Pulse: 91   Resp: 18   Temp: 97.8 °F (36.6 °C)   SpO2: 97%   Weight: 73.5 kg (162 lb)   Height: 157.5 cm (62\")     Constitutional: Appears well-developed and well-nourished. No distress.   ECOG: (1) Restricted in Physically Strenuous Activity, Ambulatory & Able to Do Work of Light Nature  HENT:   Head: Normocephalic.   Mouth/Throat: Oropharynx is clear and moist.   Eyes: Conjunctivae are normal. Pupils are equal, round, and reactive to light. No scleral icterus.   Neck: Neck supple. No JVD present. No thyromegaly present.   Cardiovascular: Normal rate, regular rhythm and normal heart sounds.    Pulmonary/Chest: Breath sounds normal. No respiratory distress.   Abdominal: Soft. Exhibits no distension and no mass. There is no hepatosplenomegaly. There is no tenderness. " There is no rebound and no guarding.   Musculoskeletal:Exhibits no edema, tenderness or deformity.   Neurological: Alert and oriented to person, place, and time. Exhibits normal muscle tone.   Skin: No ecchymosis, no petechiae and no rash noted. Not diaphoretic. No cyanosis. Nails show no clubbing.   Psychiatric: Normal mood and affect.   Vitals reviewed.      Lab Results   Component Value Date    HGB 11.5 (L) 02/03/2020    HCT 36.1 02/03/2020    MCV 99.2 (H) 02/03/2020     02/03/2020    WBC 7.06 02/03/2020    NEUTROABS 4.96 02/03/2020    LYMPHSABS 1.07 02/03/2020    MONOSABS 0.62 02/03/2020    EOSABS 0.06 02/03/2020    BASOSABS 0.07 02/03/2020       Lab Results   Component Value Date    GLUCOSE 87 02/03/2020    BUN 20 02/03/2020    CREATININE 0.61 02/03/2020     02/03/2020    K 4.5 02/03/2020     02/03/2020    CO2 23.0 02/03/2020    CALCIUM 8.9 02/03/2020    PROTEINTOT 6.2 02/03/2020    ALBUMIN 3.90 02/03/2020    BILITOT 0.2 02/03/2020    ALKPHOS 87 02/03/2020    AST 20 02/03/2020    ALT 11 02/03/2020                   ASSESSMENT & PLAN:  1. T-cell rich, histiocyte rich diffuse large B-cell lymphoma of the anterior mediastinal, subcarinal, right lower lobe, head of pancreas, paraesophageal nodes, liver, spleen, adrenals, mesenteric, retroperitoneal, right iliac nodes, spine, right shoulder, right rib, sacrum, right hip, periumbilical subcutaneous nodules, extensive periportal and periaortic lymphadenopathy with umbilical node excision and subsequent chemotherapy as outlined above now due for the final 2 courses of R-CHOP with negative PET 2/3/2020  2. Chemo induced anemia    Discussion: I reviewed PET and images thereof.  No evidence of active disease.  We will press on with 2 final courses of R-CHOP and then repeat the PET.  With her age over 58 and LDH only 216 upper limit of normal 214 with normal albumin, the fact that she had retroperitoneal node involvement and more than 1 extranodal site  of involvement would not be enough to generally recommend CNS prophylaxis.  She asked about that today.  There was no evidence of concurrent bone marrow involvement on PET.  She will see my nurse practitioner back for her final course of R-CHOP and then we will set her up for survivorship visit and subsequent NCCN guided follow-up plans.  Discussed with patient face-to-face for 25 minutes greater than 50% spent counseling regarding this plan        Ranulfo Kline MD    02/10/2020

## 2020-02-17 ENCOUNTER — HOSPITAL ENCOUNTER (OUTPATIENT)
Dept: ONCOLOGY | Facility: HOSPITAL | Age: 70
Setting detail: INFUSION SERIES
Discharge: HOME | End: 2020-02-17

## 2020-02-17 VITALS
RESPIRATION RATE: 18 BRPM | DIASTOLIC BLOOD PRESSURE: 66 MMHG | BODY MASS INDEX: 30.36 KG/M2 | WEIGHT: 165 LBS | HEART RATE: 106 BPM | TEMPERATURE: 98.4 F | SYSTOLIC BLOOD PRESSURE: 129 MMHG | HEIGHT: 62 IN

## 2020-02-17 DIAGNOSIS — C83.38 DIFFUSE LARGE B-CELL LYMPHOMA OF LYMPH NODES OF MULTIPLE REGIONS (HCC): Chronic | ICD-10-CM

## 2020-02-17 PROCEDURE — G0463 HOSPITAL OUTPT CLINIC VISIT: HCPCS

## 2020-02-24 ENCOUNTER — HOSPITAL ENCOUNTER (OUTPATIENT)
Dept: GENERAL RADIOLOGY | Facility: HOSPITAL | Age: 70
Discharge: HOME | End: 2020-02-24
Admitting: INTERNAL MEDICINE

## 2020-02-24 ENCOUNTER — HOSPITAL ENCOUNTER (OUTPATIENT)
Dept: ONCOLOGY | Facility: HOSPITAL | Age: 70
Setting detail: INFUSION SERIES
Discharge: HOME | End: 2020-02-24

## 2020-02-24 ENCOUNTER — TELEPHONE (OUTPATIENT)
Dept: ONCOLOGY | Facility: CLINIC | Age: 70
End: 2020-02-24

## 2020-02-24 VITALS
TEMPERATURE: 97.7 F | HEART RATE: 99 BPM | DIASTOLIC BLOOD PRESSURE: 59 MMHG | RESPIRATION RATE: 16 BRPM | HEIGHT: 62 IN | WEIGHT: 161 LBS | BODY MASS INDEX: 29.63 KG/M2 | SYSTOLIC BLOOD PRESSURE: 115 MMHG

## 2020-02-24 DIAGNOSIS — Z45.2 PICC (PERIPHERALLY INSERTED CENTRAL CATHETER) IN PLACE: ICD-10-CM

## 2020-02-24 DIAGNOSIS — C83.30 DIFFUSE LARGE B-CELL LYMPHOMA, UNSPECIFIED BODY REGION (HCC): Primary | ICD-10-CM

## 2020-02-24 DIAGNOSIS — C83.30 DIFFUSE LARGE B-CELL LYMPHOMA, UNSPECIFIED BODY REGION (HCC): ICD-10-CM

## 2020-02-24 DIAGNOSIS — Z45.2 ENCOUNTER FOR ADJUSTMENT OR MANAGEMENT OF VASCULAR ACCESS DEVICE: Primary | ICD-10-CM

## 2020-02-24 PROCEDURE — G0463 HOSPITAL OUTPT CLINIC VISIT: HCPCS

## 2020-02-24 PROCEDURE — 71045 X-RAY EXAM CHEST 1 VIEW: CPT

## 2020-02-24 RX ORDER — SODIUM CHLORIDE 0.9 % (FLUSH) 0.9 %
10 SYRINGE (ML) INJECTION AS NEEDED
Status: DISCONTINUED | OUTPATIENT
Start: 2020-02-24 | End: 2020-02-25 | Stop reason: HOSPADM

## 2020-02-24 RX ORDER — HEPARIN SODIUM (PORCINE) LOCK FLUSH IV SOLN 100 UNIT/ML 100 UNIT/ML
500 SOLUTION INTRAVENOUS AS NEEDED
Status: CANCELLED | OUTPATIENT
Start: 2020-02-24

## 2020-02-24 RX ORDER — SODIUM CHLORIDE 0.9 % (FLUSH) 0.9 %
10 SYRINGE (ML) INJECTION AS NEEDED
Status: CANCELLED | OUTPATIENT
Start: 2020-02-24

## 2020-02-24 RX ADMIN — SODIUM CHLORIDE, PRESERVATIVE FREE 10 ML: 5 INJECTION INTRAVENOUS at 13:12

## 2020-02-24 NOTE — TELEPHONE ENCOUNTER
Discussed with Dr. Kline.  CXR ordered to verify placement.  Patient notified and verbalized understanding.  States she will go to [facility] today.

## 2020-02-24 NOTE — TELEPHONE ENCOUNTER
----- Message from Mignon Davenport RN sent at 2/24/2020  2:00 PM EST -----  Regarding: PURVIS/PICC  Patient here for PICC dsg change, site CDI, positive blood return, PICC is measuring 12cm out from insertion site. Unable to find documentation of original cm from PICC insertion site. Patient states she has one more tx next week for RCHOP. Please advise on PICC. Thanks.   Maciej Santamaria   [phone]

## 2020-02-25 ENCOUNTER — TELEPHONE (OUTPATIENT)
Dept: ONCOLOGY | Facility: CLINIC | Age: 70
End: 2020-02-25

## 2020-02-25 NOTE — TELEPHONE ENCOUNTER
CXR results reviewed by Dr. Kline.  PICC in subclavian vein.  Ok to proceed with last treatment with current PICC.  Left voicemail for patient.

## 2020-02-25 NOTE — TELEPHONE ENCOUNTER
Pt would like a call back from [place] regarding the pts PICC Line.       Pt Contact: [phone]

## 2020-02-25 NOTE — TELEPHONE ENCOUNTER
Patient called back to let me know she received my message and ask if her PICC would be removed after her last treatment.  Advised we would more than likely remove after her treatment on 3/2/20, but to ask Manju when she sees her that day.  We would have to place an order for removal.  Verbalized understanding.

## 2020-03-02 ENCOUNTER — OFFICE VISIT (OUTPATIENT)
Dept: ONCOLOGY | Facility: CLINIC | Age: 70
End: 2020-03-02

## 2020-03-02 ENCOUNTER — HOSPITAL ENCOUNTER (OUTPATIENT)
Dept: ONCOLOGY | Facility: HOSPITAL | Age: 70
Setting detail: INFUSION SERIES
Discharge: HOME | End: 2020-03-02

## 2020-03-02 VITALS
TEMPERATURE: 97.2 F | SYSTOLIC BLOOD PRESSURE: 125 MMHG | HEART RATE: 92 BPM | OXYGEN SATURATION: 97 % | WEIGHT: 166 LBS | DIASTOLIC BLOOD PRESSURE: 70 MMHG | HEIGHT: 62 IN | BODY MASS INDEX: 30.55 KG/M2 | RESPIRATION RATE: 18 BRPM

## 2020-03-02 VITALS — SYSTOLIC BLOOD PRESSURE: 129 MMHG | HEART RATE: 78 BPM | DIASTOLIC BLOOD PRESSURE: 62 MMHG

## 2020-03-02 DIAGNOSIS — C83.38 DIFFUSE LARGE B-CELL LYMPHOMA OF LYMPH NODES OF MULTIPLE REGIONS (HCC): Primary | ICD-10-CM

## 2020-03-02 DIAGNOSIS — C83.38 DIFFUSE LARGE B-CELL LYMPHOMA OF LYMPH NODES OF MULTIPLE REGIONS (HCC): Primary | Chronic | ICD-10-CM

## 2020-03-02 LAB
ALBUMIN SERPL-MCNC: 3.9 G/DL (ref 3.5–5.2)
ALBUMIN/GLOB SERPL: 2 G/DL
ALP SERPL-CCNC: 76 U/L (ref 39–117)
ALT SERPL W P-5'-P-CCNC: 12 U/L (ref 1–33)
ANION GAP SERPL CALCULATED.3IONS-SCNC: 9 MMOL/L (ref 5–15)
AST SERPL-CCNC: 20 U/L (ref 1–32)
BILIRUB SERPL-MCNC: 0.3 MG/DL (ref 0.2–1.2)
BUN BLD-MCNC: 20 MG/DL (ref 8–23)
BUN/CREAT SERPL: 32.8 (ref 7–25)
CALCIUM SPEC-SCNC: 8.9 MG/DL (ref 8.6–10.5)
CHLORIDE SERPL-SCNC: 105 MMOL/L (ref 98–107)
CO2 SERPL-SCNC: 27 MMOL/L (ref 22–29)
CREAT BLD-MCNC: 0.61 MG/DL (ref 0.57–1)
ERYTHROCYTE [DISTWIDTH] IN BLOOD BY AUTOMATED COUNT: 15.8 % (ref 12.3–15.4)
GFR SERPL CREATININE-BSD FRML MDRD: 97 ML/MIN/1.73
GLOBULIN UR ELPH-MCNC: 2 GM/DL
GLUCOSE BLD-MCNC: 94 MG/DL (ref 65–99)
HCT VFR BLD AUTO: 32.1 % (ref 34–46.6)
HGB BLD-MCNC: 10.4 G/DL (ref 12–15.9)
LYMPHOCYTES # BLD AUTO: 0.8 10*3/MM3 (ref 0.7–3.1)
LYMPHOCYTES NFR BLD AUTO: 21.1 % (ref 19.6–45.3)
MCH RBC QN AUTO: 30.7 PG (ref 26.6–33)
MCHC RBC AUTO-ENTMCNC: 32.3 G/DL (ref 31.5–35.7)
MCV RBC AUTO: 95.1 FL (ref 79–97)
MONOCYTES # BLD AUTO: 0.4 10*3/MM3 (ref 0.1–0.9)
MONOCYTES NFR BLD AUTO: 9.9 % (ref 5–12)
NEUTROPHILS # BLD AUTO: 2.7 10*3/MM3 (ref 1.7–7)
NEUTROPHILS NFR BLD AUTO: 69 % (ref 42.7–76)
PLATELET # BLD AUTO: 205 10*3/MM3 (ref 140–450)
PMV BLD AUTO: 6.6 FL (ref 6–12)
POTASSIUM BLD-SCNC: 3.7 MMOL/L (ref 3.5–5.2)
PROT SERPL-MCNC: 5.9 G/DL (ref 6–8.5)
RBC # BLD AUTO: 3.37 10*6/MM3 (ref 3.77–5.28)
SODIUM BLD-SCNC: 141 MMOL/L (ref 136–145)
WBC NRBC COR # BLD: 3.9 10*3/MM3 (ref 3.4–10.8)

## 2020-03-02 PROCEDURE — 25010000002 CYCLOPHOSPHAMIDE PER 100 MG: Performed by: NURSE PRACTITIONER

## 2020-03-02 PROCEDURE — 99213 OFFICE O/P EST LOW 20 MIN: CPT | Performed by: NURSE PRACTITIONER

## 2020-03-02 PROCEDURE — 25010000002 PALONOSETRON 0.25 MG/5ML SOLUTION PREFILLED SYRINGE: Performed by: NURSE PRACTITIONER

## 2020-03-02 PROCEDURE — 96417 CHEMO IV INFUS EACH ADDL SEQ: CPT

## 2020-03-02 PROCEDURE — 96411 CHEMO IV PUSH ADDL DRUG: CPT

## 2020-03-02 PROCEDURE — 25010000002 VINCRISTINE PER 1 MG: Performed by: NURSE PRACTITIONER

## 2020-03-02 PROCEDURE — 25010000002 DOXORUBICIN PER 10 MG: Performed by: NURSE PRACTITIONER

## 2020-03-02 PROCEDURE — 25010000002 PEGFILGRASTIM 6 MG/0.6ML PREFILLED SYRINGE KIT: Performed by: NURSE PRACTITIONER

## 2020-03-02 PROCEDURE — 25010000002 RITUXIMAB 10 MG/ML SOLUTION 10 ML VIAL: Performed by: NURSE PRACTITIONER

## 2020-03-02 PROCEDURE — 25010000002 DEXAMETHASONE PER 1 MG: Performed by: NURSE PRACTITIONER

## 2020-03-02 PROCEDURE — 80053 COMPREHEN METABOLIC PANEL: CPT | Performed by: INTERNAL MEDICINE

## 2020-03-02 PROCEDURE — 25010000002 DIPHENHYDRAMINE PER 50 MG: Performed by: NURSE PRACTITIONER

## 2020-03-02 PROCEDURE — 96415 CHEMO IV INFUSION ADDL HR: CPT

## 2020-03-02 PROCEDURE — 96376 TX/PRO/DX INJ SAME DRUG ADON: CPT

## 2020-03-02 PROCEDURE — 25010000002 RITUXIMAB 10 MG/ML SOLUTION 50 ML VIAL: Performed by: NURSE PRACTITIONER

## 2020-03-02 PROCEDURE — 85025 COMPLETE CBC W/AUTO DIFF WBC: CPT | Performed by: INTERNAL MEDICINE

## 2020-03-02 PROCEDURE — 96413 CHEMO IV INFUSION 1 HR: CPT

## 2020-03-02 PROCEDURE — 96375 TX/PRO/DX INJ NEW DRUG ADDON: CPT

## 2020-03-02 PROCEDURE — 25010000002 FOSAPREPITANT PER 1 MG: Performed by: NURSE PRACTITIONER

## 2020-03-02 PROCEDURE — 96367 TX/PROPH/DG ADDL SEQ IV INF: CPT

## 2020-03-02 PROCEDURE — 96377 APPLICATON ON-BODY INJECTOR: CPT

## 2020-03-02 RX ORDER — ACETAMINOPHEN 325 MG/1
650 TABLET ORAL ONCE
Status: COMPLETED | OUTPATIENT
Start: 2020-03-02 | End: 2020-03-02

## 2020-03-02 RX ORDER — MEPERIDINE HYDROCHLORIDE 50 MG/ML
25 INJECTION INTRAMUSCULAR; INTRAVENOUS; SUBCUTANEOUS
Status: CANCELLED | OUTPATIENT
Start: 2020-03-02

## 2020-03-02 RX ORDER — FAMOTIDINE 10 MG/ML
20 INJECTION, SOLUTION INTRAVENOUS AS NEEDED
Status: CANCELLED | OUTPATIENT
Start: 2020-03-02

## 2020-03-02 RX ORDER — DIPHENHYDRAMINE HYDROCHLORIDE 50 MG/ML
50 INJECTION INTRAMUSCULAR; INTRAVENOUS AS NEEDED
Status: CANCELLED | OUTPATIENT
Start: 2020-03-02

## 2020-03-02 RX ORDER — PALONOSETRON 0.05 MG/ML
0.25 INJECTION, SOLUTION INTRAVENOUS ONCE
Status: COMPLETED | OUTPATIENT
Start: 2020-03-02 | End: 2020-03-02

## 2020-03-02 RX ORDER — SODIUM CHLORIDE 9 MG/ML
250 INJECTION, SOLUTION INTRAVENOUS ONCE
Status: COMPLETED | OUTPATIENT
Start: 2020-03-02 | End: 2020-03-02

## 2020-03-02 RX ORDER — DOXORUBICIN HYDROCHLORIDE 2 MG/ML
50 INJECTION, SOLUTION INTRAVENOUS ONCE
Status: COMPLETED | OUTPATIENT
Start: 2020-03-02 | End: 2020-03-02

## 2020-03-02 RX ORDER — ACETAMINOPHEN 325 MG/1
650 TABLET ORAL ONCE
Status: CANCELLED | OUTPATIENT
Start: 2020-03-02

## 2020-03-02 RX ORDER — PALONOSETRON 0.05 MG/ML
0.25 INJECTION, SOLUTION INTRAVENOUS ONCE
Status: CANCELLED | OUTPATIENT
Start: 2020-03-02

## 2020-03-02 RX ORDER — DOXORUBICIN HYDROCHLORIDE 2 MG/ML
50 INJECTION, SOLUTION INTRAVENOUS ONCE
Status: CANCELLED | OUTPATIENT
Start: 2020-03-02

## 2020-03-02 RX ORDER — PREDNISONE 50 MG/1
TABLET ORAL
COMMUNITY
Start: 2020-02-27 | End: 2020-07-09

## 2020-03-02 RX ORDER — SODIUM CHLORIDE 9 MG/ML
250 INJECTION, SOLUTION INTRAVENOUS ONCE
Status: CANCELLED | OUTPATIENT
Start: 2020-03-02

## 2020-03-02 RX ADMIN — VINCRISTINE SULFATE 2 MG: 1 INJECTION, SOLUTION INTRAVENOUS at 12:53

## 2020-03-02 RX ADMIN — CYCLOPHOSPHAMIDE 1270 MG: 1 INJECTION, POWDER, FOR SOLUTION INTRAVENOUS; ORAL at 13:23

## 2020-03-02 RX ADMIN — ACETAMINOPHEN 650 MG: 325 TABLET, FILM COATED ORAL at 10:31

## 2020-03-02 RX ADMIN — PEGFILGRASTIM 6 MG: KIT SUBCUTANEOUS at 14:03

## 2020-03-02 RX ADMIN — DOXORUBICIN HYDROCHLORIDE 84 MG: 2 INJECTION, SOLUTION INTRAVENOUS at 12:44

## 2020-03-02 RX ADMIN — DEXAMETHASONE SODIUM PHOSPHATE 12 MG: 4 INJECTION, SOLUTION INTRAMUSCULAR; INTRAVENOUS at 10:32

## 2020-03-02 RX ADMIN — RITUXIMAB 600 MG: 10 INJECTION, SOLUTION INTRAVENOUS at 10:55

## 2020-03-02 RX ADMIN — SODIUM CHLORIDE 150 MG: 9 INJECTION, SOLUTION INTRAVENOUS at 10:32

## 2020-03-02 RX ADMIN — PALONOSETRON 0.25 MG: 0.25 INJECTION, SOLUTION INTRAVENOUS at 10:31

## 2020-03-02 RX ADMIN — SODIUM CHLORIDE 250 ML: 9 INJECTION, SOLUTION INTRAVENOUS at 10:28

## 2020-03-02 RX ADMIN — DIPHENHYDRAMINE HYDROCHLORIDE 50 MG: 50 INJECTION INTRAMUSCULAR; INTRAVENOUS at 10:32

## 2020-03-02 NOTE — PROGRESS NOTES
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 History of benign transvaginal hysterectomy 1/1/2016  2. History of reflux  3. History of transient global amnesia  4. IV dye allergy as well as allergy to barium enema  5. 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  6/8/2016 colonoscopy benign  1/2019 apparently had CT screening of the chest that showed a couple of liver abnormalities (do not have those reports).  Unable to do with contrast and IV dye allergy.  Asymptomatic.  9/5/2019 CT of the abdomen without contrast shows at least 2 hepatic lesions, 4 cm in the right lobe of the liver and 2 cm in the left lobe of the liver.  9/11/2019 PET scan shows too numerous to count increased hypermetabolism in the anterior mediastinal, subcarinal, right lower lobe, head of the pancreas, esophagus, adjacent paraesophageal lymph nodes, liver, splenic hilum, left and right adrenal glands.  Also uptake in the mesentery, right retroperitoneum, right iliac chain, periumbilical subcutaneous fat, vaginal stump, and numerous bony lesions.  The bony lesions include the  "spine, right shoulder, right rib, sacrum, and right hip.  The 1.3 cm periumbilical subcutaneous nodule appears most amenable to biopsy.  Extensive periportal and periaortic lymphadenopathy with evidence of peritoneal metastasis for which PET CT was recommended.  9/18/2019 CT-guided fine-needle aspirate of nodule within the anterior abdominal wall \"atypical\" but unable to further typify.  9/26/2019 Dr. Kline initial visit.  Reviewed PET with patient and worrisome but nondiagnostic fine-needle aspirate.  Going to get sufficient tissue not only for diagnosis but for molecular testing that could include Biotheranostics to distinguish tissue type and 15 slides for Strata testing.  We will get our navigator on board as well.  She is having some nighttime sweats without fever though she does not describe them as bed drenching but this is new for her in the last month.  The range of possibilities here are broad and include lymphoma as well as a multiplicity of carcinomas but I need more than \"atypical\" cells on a fine-needle aspirate to ultimately decide our course.  We did talk about the likelihood that what ever we are up against it is unlikely to be curative but we will wait out her risk benefit ratio once we know the pathology.  Though they may ultimately not be helpful, while we are getting tissue I will go ahead and send off a barrage of tumor markers that might help guide us to the primary.    -10/7/2019 medical oncology office visit: Preliminary path report shows T-cell rich B-cell non-Hodgkin's lymphoma.  While awaiting final pathology we will get her port and echocardiogram and plan on treating her with Rituxan CHOP with Neulasta device for her stage IV high-grade lymphoma.  She has a high risk IPI score.  We will get her LDH and uric acid today.  Risks including renal dysfunction, nausea, neuropathy, pancytopenia, cardiac dysfunction, infusion reactions, etc. were discussed in detail in addition to additional " side effects as standard per all these medicines.  She knows that if this is the T-cell rich B-cell lymphoma that we treat this like a diffuse large B-cell lymphoma and that there is a possibility but not a guarantee of cure and it is not the majority in this setting but that plan and the statistics may change if the final pathology changes.    -10/8/2019 hepatitis B panel: Negative hepatitis B core antibody, negative hepatitis B surface antigen, reactive hepatitis B surface antibody.  Will need to monitor for any elevation of liver function studies during therapy.    -10/15/2019: Communication from Dr. Damian from [place] is that the final decision is that this is T-cell rich/histiocyte rich diffuse large B cell lymphoma.  Plan to proceed with R-CHOP with cooling cap.     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  Ejection fraction 58%.  Has cooling cap Education.  There is a international shortage of vincristine.  She will get vincristine with R-CHOP course #1 today but I am hesitant to just drop vincristine from all of her subsequent regimens but we will not have any to give her.  I plan to switch her regimen to EPOCHR but have removed the vincristine from the plan.  This will make her cooling cap a moot point since this is an 4-day infusional chemotherapy.  I will admit her on November 11 through hospitalists and plan for Neulasta shot as an outpatient on November 16.    -11/22/2019 through 11/25/2019 inpatient for MRSA line infection.  Port removed.    -12/2/19 hematology oncology office visit: Will finish 4 weeks of IV antibiotics with Dr. Alvarenga on approximately 12/23/2019.  Will get PET prior to then.  Has received 1 course of R-CHOP and 1 course of EPCHR.  Vincristine is now available and we will go back to R-CHOP for course #3 but  we need to finish IV antibiotics to ensure clearance of infection.  We will see her back 12/23/2019 for R-CHOP with PET prior to return.  We will plan on using the PICC line that currently is being used for her IV antibiotics.  Has noted some bubbles in her urine that I am not sure the significance.  No josiane evidence of fistula.    -12/23/2019 hematology oncology office visit: Prior bone and adenopathy abnormalities on PET resolved on images I reviewed and reports thereof.  Vincristine now available.  This is course #3 of treatment (R-CHOP today) we will rescan after 2 more courses but plan on a total of 6 courses given the stage IV presentation with bone involvement.  We will give 1 extra week to help her clear the infection thoroughly.  She stopped antibiotics 2 days ago intravenously.  Rescan every couple of courses.    -2/3/2020 PET negative        Diffuse large B-cell lymphoma of lymph nodes of multiple regions (CMS/HCC)     Initial Diagnosis     Diffuse large B-cell lymphoma of lymph nodes of multiple regions (CMS/HCC)      9/11/2019 Imaging     PET/CT IMPRESSION:  There are too numerous to count hypermetabolic lesions as  described above involving multiple soft tissues and bony structures.  There is no prior exam for comparison. Although many sites are amenable  to percutaneous biopsy or excision, there is a periumbilical  subcutaneous nodule measuring 1.1 x 1.3 cm which would be the least  risky option.      10/21/2019 -  Chemotherapy     Course #1 R-CHOP 10/21/2019  Course #2 etoposide substitution for vincristine x1 due to vincristine shortage 11/11/2019-11/15/2019  Course #3 resumed R-CHOP with vincristine now available 12/30/2019   Course #4 R-CHOP 1/20/2020 11/22/2019 Adverse Reaction     Admitted to [facility] with fever in port site infection.  Treated on empiric antibiotics.  Port-A-Cath removed 11/23/2019, PICC line was placed.      12/13/2019 Imaging     PET is positive at the  "old port site but otherwise prior lymphadenopathy and bony abnormalities resolved       2/3/2020 Imaging     2/3/2020 PET negative         HISTORY OF PRESENT ILLNESS:  The patient is a 69 y.o. female, here for follow up on management of T-cell rich histiocyte rich B-cell lymphoma.  The patient is doing well overall tolerating therapy with R-CHOP with no significant side effects.  Mild fatigue.  Constipation relieved with stool softener and MiraLAX.  No fevers or chills or illnesses since we saw her last.      Past Medical History:   Diagnosis Date   • Abnormal Pap smear of vagina     \"Abnormal pap smear\"   • Anxiety    • Cervical dysplasia    • Cystocele    • GERD (gastroesophageal reflux disease)     INTERMITTENT- NOT MEDICATION   • Migraine headache    • Osteopenia    • Transient global amnesia     fall 2015     Past Surgical History:   Procedure Laterality Date   • BREAST CYST ASPIRATION     • BREAST SURGERY      biopsy x 3   • CERVICAL BIOPSY      PT UNAWARE OF CERVIX BIOPSY   • IMPACTED THIRD MOLAR REMOVAL      wisdom teeth extracted in which two partially impacted   • OTHER SURGICAL HISTORY  10/02/2019    surgical bx of abd wall tumor   • PERIPHERALLY INSERTED CENTRAL CATHETER INSERTION Left 11/2019   • TONSILLECTOMY     • VAGINAL HYSTERECTOMY      with anterior repair   • VAGINAL HYSTERECTOMY W/ ANTERIOR AND POSTERIOR VAGINAL REPAIR N/A 11/15/2016    Procedure: VAGINAL HYSTERECTOMY WITH ANTERIOR VAGINAL REPAIR;  Surgeon: Henry Pitt MD;  Location:  [facility] OR;  Service:    • VENOUS ACCESS DEVICE (PORT) REMOVAL N/A 11/23/2019    Procedure: REMOVAL VENOUS ACCESS DEVICE;  Surgeon: Primo Swenson MD;  Location:  [facility] OR;  Service: General       Allergies   Allergen Reactions   • Betadine [Povidone Iodine]    • Barium-Containing Compounds Unknown (See Comments)     unknown   • Codeine    • Contrast Dye Angioedema     Unknown   • E-Mycin [Erythromycin]    • Epinephrine    • Iodine    • Lidocaine Other " "(See Comments)     PT states when PICC was being placed and Lidocaine was being used she had a tightness feeling around her neck.  PT thought her gown was getting tight around her neck and when she went to pull the gown down, it was not around her neck.    • Other      MSG, Rema, Preservatives in eye ointment   • Procaine Other (See Comments)     DENTAL ANASTHESIA CAUSED HEART RACING- YRS AGO\"   • Sudafed [Pseudoephedrine Hcl] Palpitations       Family History and Social History reviewed and changed as necessary      REVIEW OF SYSTEM:   Review of Systems   Constitutional: Negative for appetite change, chills, diaphoresis, fever and unexpected weight change.  Positive for fatigue.  HENT:   Negative for mouth sores, sore throat and trouble swallowing.    Eyes: Negative for icterus.   Respiratory: Negative for cough, hemoptysis and shortness of breath.    Cardiovascular: Negative for chest pain, leg swelling and palpitations.   Gastrointestinal: Negative for abdominal distention, abdominal pain, blood in stool, diarrhea, nausea and vomiting.  Positive for constipation.  Endocrine: Negative for hot flashes.   Genitourinary: Negative for bladder incontinence, difficulty urinating, dysuria, frequency and hematuria.    Musculoskeletal: Negative for gait problem, neck pain and neck stiffness.   Skin: Negative for rash.   Neurological: Negative for dizziness, gait problem, headaches, light-headedness and numbness.   Hematological: Negative for adenopathy. Does not bruise/bleed easily.   Psychiatric/Behavioral: Negative for depression. The patient is not nervous/anxious.    All other systems reviewed and are negative.       PHYSICAL EXAM    Vitals:    03/02/20 0847   BP: 125/70   Pulse: 92   Resp: 18   Temp: 97.2 °F (36.2 °C)   SpO2: 97%   Weight: 75.3 kg (166 lb)   Height: 157.5 cm (62\")     Constitutional: Appears well-developed and well-nourished. No distress.   ECOG: (1) Restricted in Physically Strenuous Activity, " Ambulatory & Able to Do Work of Light Nature  HENT:   Head: Normocephalic.   Mouth/Throat: Oropharynx is clear and moist.   Eyes: Conjunctivae are normal. Pupils are equal, round, and reactive to light. No scleral icterus.   Neck: Neck supple. No JVD present. No thyromegaly present.   Cardiovascular: Normal rate, regular rhythm and normal heart sounds.    Pulmonary/Chest: Breath sounds normal. No respiratory distress.   Abdominal: Soft. Exhibits no distension and no mass. There is no hepatosplenomegaly. There is no tenderness. There is no rebound and no guarding.   Musculoskeletal:Exhibits no edema, tenderness or deformity.   Neurological: Alert and oriented to person, place, and time. Exhibits normal muscle tone.   Skin: No ecchymosis, no petechiae and no rash noted. Not diaphoretic. No cyanosis. Nails show no clubbing.   Psychiatric: Normal mood and affect.   Vitals reviewed.  Labs reviewed.    Lab Results   Component Value Date    HGB 11.3 (L) 02/10/2020    HCT 34.0 02/10/2020    MCV 94.6 02/10/2020     02/10/2020    WBC 4.80 02/10/2020    NEUTROABS 3.70 02/10/2020    LYMPHSABS 0.80 02/10/2020    MONOSABS 0.30 02/10/2020    EOSABS 0.06 02/03/2020    BASOSABS 0.07 02/03/2020       Lab Results   Component Value Date    GLUCOSE 98 02/10/2020    BUN 22 02/10/2020    CREATININE 0.63 02/10/2020     02/10/2020    K 4.0 02/10/2020     02/10/2020    CO2 27.0 02/10/2020    CALCIUM 9.2 02/10/2020    PROTEINTOT 5.9 (L) 02/10/2020    ALBUMIN 3.90 02/10/2020    BILITOT 0.3 02/10/2020    ALKPHOS 73 02/10/2020    AST 16 02/10/2020    ALT 12 02/10/2020           ASSESSMENT & PLAN:  1. T-cell rich, histiocyte rich diffuse large B-cell lymphoma of the anterior mediastinal, subcarinal, right lower lobe, head of pancreas, paraesophageal nodes, liver, spleen, adrenals, mesenteric, retroperitoneal, right iliac nodes, spine, right shoulder, right rib, sacrum, right hip, periumbilical subcutaneous nodules, extensive  periportal and periaortic lymphadenopathy with umbilical node excision and subsequent chemotherapy as outlined above now due for the final 2 courses of R-CHOP with negative PET 2/3/2020  2. Constipation    Discussion: Patient is doing well overall and tolerating therapy with R-CHOP.  We will press on today with her final courses of R-CHOP and then repeat the PET.  The patient had questions today regarding routine healthcare screening such as dental cleaning for which she is due and mammogram.  I discussed with her that she should be able to do those things when she has her PET scan results back and can get them scheduled late March or early April.  We will see her back in 3-4 weeks to go over her PET/CT, following that visit about 3 months out I will do a survivorship visit with her.  She will continue MiraLAX and stool softener for her constipation.  She also thinks that she is due soon for routine colonoscopy, she will speak with her primary care provider regarding this.    The patient's PICC line was noted to be a little out from previous measurement on PIC dressing change 2/24/2020, chest x-ray was done and her PICC line was noted to be in the subclavian vein, Dr. Kline gave okay to use current PICC line for this last treatment.  We will have her PICC line pulled after treatment today.    I spent a total of 15 minutes in direct patient care, greater than 10  minutes (greater than 50%) were spent in coordination of care, and counseling the patient regarding  (diagnosis) . Answered any questions patient had regarding medications and plan of care.     Manju Schulz, APRN    03/02/2020

## 2020-03-06 ENCOUNTER — TRANSCRIBE ORDERS (OUTPATIENT)
Dept: ADMINISTRATIVE | Facility: HOSPITAL | Age: 70
End: 2020-03-06

## 2020-03-06 DIAGNOSIS — Z12.31 VISIT FOR SCREENING MAMMOGRAM: Primary | ICD-10-CM

## 2020-03-23 ENCOUNTER — HOSPITAL ENCOUNTER (OUTPATIENT)
Dept: PET IMAGING | Facility: HOSPITAL | Age: 70
Discharge: HOME | End: 2020-03-23
Admitting: NURSE PRACTITIONER

## 2020-03-23 ENCOUNTER — HOSPITAL ENCOUNTER (OUTPATIENT)
Dept: PET IMAGING | Facility: HOSPITAL | Age: 70
Discharge: HOME | End: 2020-03-23

## 2020-03-23 ENCOUNTER — LAB (OUTPATIENT)
Dept: LAB | Facility: HOSPITAL | Age: 70
End: 2020-03-23

## 2020-03-23 DIAGNOSIS — C83.38 DIFFUSE LARGE B-CELL LYMPHOMA OF LYMPH NODES OF MULTIPLE REGIONS (HCC): ICD-10-CM

## 2020-03-23 DIAGNOSIS — C83.38 DIFFUSE LARGE B-CELL LYMPHOMA OF LYMPH NODES OF MULTIPLE REGIONS (HCC): Chronic | ICD-10-CM

## 2020-03-23 LAB
ALBUMIN SERPL-MCNC: 4 G/DL (ref 3.5–5.2)
ALBUMIN/GLOB SERPL: 2 G/DL
ALP SERPL-CCNC: 79 U/L (ref 39–117)
ALT SERPL W P-5'-P-CCNC: 17 U/L (ref 1–33)
ANION GAP SERPL CALCULATED.3IONS-SCNC: 12 MMOL/L (ref 5–15)
AST SERPL-CCNC: 25 U/L (ref 1–32)
BASOPHILS # BLD AUTO: 0.05 10*3/MM3 (ref 0–0.2)
BASOPHILS NFR BLD AUTO: 1.1 % (ref 0–1.5)
BILIRUB SERPL-MCNC: 0.4 MG/DL (ref 0.2–1.2)
BUN BLD-MCNC: 17 MG/DL (ref 8–23)
BUN/CREAT SERPL: 23 (ref 7–25)
CALCIUM SPEC-SCNC: 9 MG/DL (ref 8.6–10.5)
CHLORIDE SERPL-SCNC: 101 MMOL/L (ref 98–107)
CO2 SERPL-SCNC: 25 MMOL/L (ref 22–29)
CREAT BLD-MCNC: 0.74 MG/DL (ref 0.57–1)
DEPRECATED RDW RBC AUTO: 51.2 FL (ref 37–54)
EOSINOPHIL # BLD AUTO: 0.08 10*3/MM3 (ref 0–0.4)
EOSINOPHIL NFR BLD AUTO: 1.8 % (ref 0.3–6.2)
ERYTHROCYTE [DISTWIDTH] IN BLOOD BY AUTOMATED COUNT: 14.7 % (ref 12.3–15.4)
GFR SERPL CREATININE-BSD FRML MDRD: 77 ML/MIN/1.73
GLOBULIN UR ELPH-MCNC: 2 GM/DL
GLUCOSE BLD-MCNC: 83 MG/DL (ref 65–99)
GLUCOSE BLDC GLUCOMTR-MCNC: 97 MG/DL (ref 70–130)
HCT VFR BLD AUTO: 33.6 % (ref 34–46.6)
HGB BLD-MCNC: 11.1 G/DL (ref 12–15.9)
IMM GRANULOCYTES # BLD AUTO: 0.02 10*3/MM3 (ref 0–0.05)
IMM GRANULOCYTES NFR BLD AUTO: 0.5 % (ref 0–0.5)
LDH SERPL-CCNC: 240 U/L (ref 135–214)
LYMPHOCYTES # BLD AUTO: 0.54 10*3/MM3 (ref 0.7–3.1)
LYMPHOCYTES NFR BLD AUTO: 12.2 % (ref 19.6–45.3)
MCH RBC QN AUTO: 32.1 PG (ref 26.6–33)
MCHC RBC AUTO-ENTMCNC: 33 G/DL (ref 31.5–35.7)
MCV RBC AUTO: 97.1 FL (ref 79–97)
MONOCYTES # BLD AUTO: 0.57 10*3/MM3 (ref 0.1–0.9)
MONOCYTES NFR BLD AUTO: 12.9 % (ref 5–12)
NEUTROPHILS # BLD AUTO: 3.16 10*3/MM3 (ref 1.7–7)
NEUTROPHILS NFR BLD AUTO: 71.5 % (ref 42.7–76)
NRBC BLD AUTO-RTO: 0 /100 WBC (ref 0–0.2)
PLATELET # BLD AUTO: 193 10*3/MM3 (ref 140–450)
PMV BLD AUTO: 9.7 FL (ref 6–12)
POTASSIUM BLD-SCNC: 4.3 MMOL/L (ref 3.5–5.2)
PROT SERPL-MCNC: 6 G/DL (ref 6–8.5)
RBC # BLD AUTO: 3.46 10*6/MM3 (ref 3.77–5.28)
SODIUM BLD-SCNC: 138 MMOL/L (ref 136–145)
WBC NRBC COR # BLD: 4.42 10*3/MM3 (ref 3.4–10.8)

## 2020-03-23 PROCEDURE — 83615 LACTATE (LD) (LDH) ENZYME: CPT

## 2020-03-23 PROCEDURE — A9552 F18 FDG: HCPCS | Performed by: NURSE PRACTITIONER

## 2020-03-23 PROCEDURE — 82962 GLUCOSE BLOOD TEST: CPT

## 2020-03-23 PROCEDURE — 85025 COMPLETE CBC W/AUTO DIFF WBC: CPT

## 2020-03-23 PROCEDURE — 80053 COMPREHEN METABOLIC PANEL: CPT

## 2020-03-23 PROCEDURE — 0 FLUDEOXYGLUCOSE F18 SOLUTION: Performed by: NURSE PRACTITIONER

## 2020-03-23 PROCEDURE — 78815 PET IMAGE W/CT SKULL-THIGH: CPT

## 2020-03-23 PROCEDURE — 36415 COLL VENOUS BLD VENIPUNCTURE: CPT

## 2020-03-23 RX ADMIN — FLUDEOXYGLUCOSE F18 1 DOSE: 300 INJECTION INTRAVENOUS at 10:23

## 2020-03-30 ENCOUNTER — TELEMEDICINE (OUTPATIENT)
Dept: ONCOLOGY | Facility: CLINIC | Age: 70
End: 2020-03-30

## 2020-03-30 DIAGNOSIS — Z91.041 ALLERGY TO RADIOGRAPHIC DYE: ICD-10-CM

## 2020-03-30 DIAGNOSIS — C83.38 DIFFUSE LARGE B-CELL LYMPHOMA OF LYMPH NODES OF MULTIPLE REGIONS (HCC): Primary | Chronic | ICD-10-CM

## 2020-03-30 PROCEDURE — 99215 OFFICE O/P EST HI 40 MIN: CPT | Performed by: INTERNAL MEDICINE

## 2020-04-02 VITALS — HEART RATE: 90 BPM | RESPIRATION RATE: 16 BRPM

## 2020-04-02 PROBLEM — Z91.041 ALLERGY TO RADIOGRAPHIC DYE: Status: ACTIVE | Noted: 2020-04-02

## 2020-04-02 NOTE — PROGRESS NOTES
Telehealth Video visit successful for COVID mitigation    CHIEF COMPLAINT: fu nhl    Problem List:  Oncology/Hematology History    1. History of benign transvaginal hysterectomy 1/1/2016  2. History of reflux  3. History of transient global amnesia  4. IV dye allergy as well as allergy to barium enema  5. 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  6/8/2016 colonoscopy benign  1/2019 apparently had CT screening of the chest that showed a couple of liver abnormalities (do not have those reports).  Unable to do with contrast and IV dye allergy.  Asymptomatic.  9/5/2019 CT of the abdomen without contrast shows at least 2 hepatic lesions, 4 cm in the right lobe of the liver and 2 cm in the left lobe of the liver.  9/11/2019 PET scan shows too numerous to count increased hypermetabolism in the anterior mediastinal, subcarinal, right lower lobe, head of the pancreas, esophagus, adjacent paraesophageal lymph nodes, liver, splenic hilum, left and right adrenal glands.  Also uptake in the mesentery, right retroperitoneum, right iliac chain, periumbilical subcutaneous fat, vaginal stump, and numerous bony lesions.  The bony lesions include the spine,  "right shoulder, right rib, sacrum, and right hip.  The 1.3 cm periumbilical subcutaneous nodule appears most amenable to biopsy.  Extensive periportal and periaortic lymphadenopathy with evidence of peritoneal metastasis for which PET CT was recommended.  9/18/2019 CT-guided fine-needle aspirate of nodule within the anterior abdominal wall \"atypical\" but unable to further typify.  9/26/2019 Dr. Kline initial visit.  Reviewed PET with patient and worrisome but nondiagnostic fine-needle aspirate.  Going to get sufficient tissue not only for diagnosis but for molecular testing that could include Biotheranostics to distinguish tissue type and 15 slides for Strata testing.  We will get our navigator on board as well.  She is having some nighttime sweats without fever though she does not describe them as bed drenching but this is new for her in the last month.  The range of possibilities here are broad and include lymphoma as well as a multiplicity of carcinomas but I need more than \"atypical\" cells on a fine-needle aspirate to ultimately decide our course.  We did talk about the likelihood that what ever we are up against it is unlikely to be curative but we will wait out her risk benefit ratio once we know the pathology.  Though they may ultimately not be helpful, while we are getting tissue I will go ahead and send off a barrage of tumor markers that might help guide us to the primary.    -10/7/2019 medical oncology office visit: Preliminary path report shows T-cell rich B-cell non-Hodgkin's lymphoma.  While awaiting final pathology we will get her port and echocardiogram and plan on treating her with Rituxan CHOP with Neulasta device for her stage IV high-grade lymphoma.  She has a high risk IPI score.  We will get her LDH and uric acid today.  Risks including renal dysfunction, nausea, neuropathy, pancytopenia, cardiac dysfunction, infusion reactions, etc. were discussed in detail in addition to additional side " effects as standard per all these medicines.  She knows that if this is the T-cell rich B-cell lymphoma that we treat this like a diffuse large B-cell lymphoma and that there is a possibility but not a guarantee of cure and it is not the majority in this setting but that plan and the statistics may change if the final pathology changes.    -10/8/2019 hepatitis B panel: Negative hepatitis B core antibody, negative hepatitis B surface antigen, reactive hepatitis B surface antibody.  Will need to monitor for any elevation of liver function studies during therapy.    -10/15/2019: Communication from Dr. Damian from [facility] is that the final decision is that this is T-cell rich/histiocyte rich diffuse large B cell lymphoma.  Plan to proceed with R-CHOP with cooling cap.     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  Ejection fraction 58%.  Has cooling cap Education.  There is a international shortage of vincristine.  She will get vincristine with R-CHOP course #1 today but I am hesitant to just drop vincristine from all of her subsequent regimens but we will not have any to give her.  I plan to switch her regimen to EPOCHR but have removed the vincristine from the plan.  This will make her cooling cap a moot point since this is an 4-day infusional chemotherapy.  I will admit her on November 11 through hospitalists and plan for Neulasta shot as an outpatient on November 16.    -11/22/2019 through 11/25/2019 inpatient for MRSA line infection.  Port removed.    -12/2/19 hematology oncology office visit: Will finish 4 weeks of IV antibiotics with Dr. Alvarenga on approximately 12/23/2019.  Will get PET prior to then.  Has received 1 course of R-CHOP and 1 course of EPCHR.  Vincristine is now available and we will go back to R-CHOP for course #3 but we  need to finish IV antibiotics to ensure clearance of infection.  We will see her back 12/23/2019 for R-CHOP with PET prior to return.  We will plan on using the PICC line that currently is being used for her IV antibiotics.  Has noted some bubbles in her urine that I am not sure the significance.  No josiane evidence of fistula.    -12/23/2019 hematology oncology office visit: Prior bone and adenopathy abnormalities on PET resolved on images I reviewed and reports thereof.  Vincristine now available.  This is course #3 of treatment (R-CHOP today) we will rescan after 2 more courses but plan on a total of 6 courses given the stage IV presentation with bone involvement.  We will give 1 extra week to help her clear the infection thoroughly.  She stopped antibiotics 2 days ago intravenously.  Rescan every couple of courses.    -2/3/2020 PET negative        Diffuse large B-cell lymphoma of lymph nodes of multiple regions (CMS/HCC)     Initial Diagnosis     Diffuse large B-cell lymphoma of lymph nodes of multiple regions (CMS/HCC)      9/11/2019 Imaging     PET/CT IMPRESSION:  There are too numerous to count hypermetabolic lesions as  described above involving multiple soft tissues and bony structures.  There is no prior exam for comparison. Although many sites are amenable  to percutaneous biopsy or excision, there is a periumbilical  subcutaneous nodule measuring 1.1 x 1.3 cm which would be the least  risky option.      10/21/2019 -  Chemotherapy     Course #1 R-CHOP 10/21/2019  Course #2 etoposide substitution for vincristine x1 due to vincristine shortage 11/11/2019-11/15/2019  Course #3 resumed R-CHOP with vincristine now available 12/30/2019   Course #4 R-CHOP 1/20/2020 11/22/2019 Adverse Reaction     Admitted to [facility] with fever in port site infection.  Treated on empiric antibiotics.  Port-A-Cath removed 11/23/2019, PICC line was placed.      12/13/2019 Imaging     PET is positive at the  "old port site but otherwise prior lymphadenopathy and bony abnormalities resolved       2/3/2020 Imaging     2/3/2020 PET negative      3/23/2020 Imaging     PET/CT IMPRESSION:  Stable negative PET/CT scan. No evidence of active or  recurrent lymphoma.         HISTORY OF PRESENT ILLNESS:  The patient is a 70 y.o. female, here for follow up on management of nhl.  Feels great. Has subungual discoloration great toe.      Past Medical History:   Diagnosis Date   • Abnormal Pap smear of vagina     \"Abnormal pap smear\"   • Anxiety    • Cervical dysplasia    • Cystocele    • GERD (gastroesophageal reflux disease)     INTERMITTENT- NOT MEDICATION   • Migraine headache    • Osteopenia    • Transient global amnesia     fall 2015     Past Surgical History:   Procedure Laterality Date   • BREAST CYST ASPIRATION     • BREAST SURGERY      biopsy x 3   • CERVICAL BIOPSY      PT UNAWARE OF CERVIX BIOPSY   • IMPACTED THIRD MOLAR REMOVAL      wisdom teeth extracted in which two partially impacted   • OTHER SURGICAL HISTORY  10/02/2019    surgical bx of abd wall tumor   • PERIPHERALLY INSERTED CENTRAL CATHETER INSERTION Left 11/2019   • TONSILLECTOMY     • VAGINAL HYSTERECTOMY      with anterior repair   • VAGINAL HYSTERECTOMY W/ ANTERIOR AND POSTERIOR VAGINAL REPAIR N/A 11/15/2016    Procedure: VAGINAL HYSTERECTOMY WITH ANTERIOR VAGINAL REPAIR;  Surgeon: Henry Pitt MD;  Location:  [facility] OR;  Service:    • VENOUS ACCESS DEVICE (PORT) REMOVAL N/A 11/23/2019    Procedure: REMOVAL VENOUS ACCESS DEVICE;  Surgeon: Primo Swenson MD;  Location:  [facility] OR;  Service: General       Allergies   Allergen Reactions   • Betadine [Povidone Iodine]    • Barium-Containing Compounds Unknown (See Comments)     unknown   • Codeine    • Contrast Dye Angioedema     Unknown   • E-Mycin [Erythromycin]    • Epinephrine    • Iodine    • Lidocaine Other (See Comments)     PT states when PICC was being placed and Lidocaine was being used she had a " "tightness feeling around her neck.  PT thought her gown was getting tight around her neck and when she went to pull the gown down, it was not around her neck.    • Other      MSG, Rema, Preservatives in eye ointment   • Procaine Other (See Comments)     DENTAL ANASTHESIA CAUSED HEART RACING- YRS AGO\"   • Sudafed [Pseudoephedrine Hcl] Palpitations       Family History and Social History reviewed and changed as necessary      REVIEW OF SYSTEM:   Review of Systems   Constitutional: Negative for appetite change, chills, diaphoresis, fatigue, fever and unexpected weight change.   HENT:   Negative for mouth sores, sore throat and trouble swallowing.    Eyes: Negative for icterus.   Respiratory: Negative for cough, hemoptysis and shortness of breath.    Cardiovascular: Negative for chest pain, leg swelling and palpitations.   Gastrointestinal: Negative for abdominal distention, abdominal pain, blood in stool, constipation, diarrhea, nausea and vomiting.   Endocrine: Negative for hot flashes.   Genitourinary: Negative for bladder incontinence, difficulty urinating, dysuria, frequency and hematuria.    Musculoskeletal: Negative for gait problem, neck pain and neck stiffness.   Skin: Negative for rash.   Neurological: Negative for dizziness, gait problem, headaches, light-headedness and numbness.   Hematological: Negative for adenopathy. Does not bruise/bleed easily.   Psychiatric/Behavioral: Negative for depression. The patient is not nervous/anxious.    All other systems reviewed and are negative.       PHYSICAL EXAM    Vitals:    04/02/20 1244   Pulse: 90   Resp: 16     There were no vitals filed for this visit.     Constitutional: Appears well-developed and well-nourished. No distress.   ECOG: (0) Fully Active - Able to Carry On All Pre-disease Performance Without Restriction  HENT:   Head: Normocephalic.   Mouth/Throat: Oropharynx is clear and moist.   Eyes: Conjunctivae are normal. Pupils are equal, round, and " reactive to light. No scleral icterus.   Nodes: none visibly enlarged  Pulmonary/Chest:  No respiratory distress.   Abdominal: Soft. Exhibits no distension .   Musculoskeletal:Exhibits no edema, tenderness or deformity.   Neurological: Alert and oriented to person, place, and time. Exhibits normal muscle tone.   Skin: No ecchymosis, no petechiae and no rash noted. Not diaphoretic. No cyanosis. Nails show no clubbing. Subungual great toe hematoma  Psychiatric: Normal mood and affect.   Vitals reviewed.      Lab Results   Component Value Date    HGB 11.1 (L) 03/23/2020    HCT 33.6 (L) 03/23/2020    MCV 97.1 (H) 03/23/2020     03/23/2020    WBC 4.42 03/23/2020    NEUTROABS 3.16 03/23/2020    LYMPHSABS 0.54 (L) 03/23/2020    MONOSABS 0.57 03/23/2020    EOSABS 0.08 03/23/2020    BASOSABS 0.05 03/23/2020       Lab Results   Component Value Date    GLUCOSE 83 03/23/2020    BUN 17 03/23/2020    CREATININE 0.74 03/23/2020     03/23/2020    K 4.3 03/23/2020     03/23/2020    CO2 25.0 03/23/2020    CALCIUM 9.0 03/23/2020    PROTEINTOT 6.0 03/23/2020    ALBUMIN 4.00 03/23/2020    BILITOT 0.4 03/23/2020    ALKPHOS 79 03/23/2020    AST 25 03/23/2020    ALT 17 03/23/2020                   ASSESSMENT & PLAN:    1. NHL t cell rich/ histiocyte rich DLB cell:  CORAL on PET. Hgb 11.1.   (). No B sx.  Reviewed Pet and images thereof. CORAL. Nl CMP. With LDH, will repeat labs and scans in 3 months then go to 6 month x 2 years.  2.  Subungual hematoma: Watch off chemo.  3.  Contrast allergy therefore check PET not  CT with contrast    This was an audio and video enabled telemedicine encounter.11:30 to 12:30 total time over 50% counseling re above plan and results.  Ranulfo Kline MD    03/30/2020

## 2020-04-03 ENCOUNTER — TELEPHONE (OUTPATIENT)
Dept: ONCOLOGY | Facility: CLINIC | Age: 70
End: 2020-04-03

## 2020-04-03 NOTE — TELEPHONE ENCOUNTER
PT notified that labs were ordered.  I printed them off and mailed them to her so she could take with her.  [id number]  ~Shell

## 2020-04-03 NOTE — TELEPHONE ENCOUNTER
PT-KATHERIN VELASQUEZ-DR. LAW PURVIS    PT HAD PET SCAN SCHEDULED IN July BUT DIDN'T SEE ORDERS FOR THE LAB.    SHE'S REQUESTING WE SEND ORDERS FOR LAB AND TO CALL HER BACK TO CONFIRM, IF POSSIBLE.    PT'S PHONE #:  [phone]

## 2020-05-13 ENCOUNTER — APPOINTMENT (OUTPATIENT)
Dept: MAMMOGRAPHY | Facility: HOSPITAL | Age: 70
End: 2020-05-13

## 2020-06-30 ENCOUNTER — TELEPHONE (OUTPATIENT)
Dept: ONCOLOGY | Facility: CLINIC | Age: 70
End: 2020-06-30

## 2020-06-30 DIAGNOSIS — C83.38 DIFFUSE LARGE B-CELL LYMPHOMA OF LYMPH NODES OF MULTIPLE REGIONS (HCC): Primary | ICD-10-CM

## 2020-06-30 NOTE — TELEPHONE ENCOUNTER
Discussed with Dr. Kline.  Will cancel PET and order CT C/A/P without contrast.  Called patient to discuss.  No answer.  Left voicemail.

## 2020-06-30 NOTE — TELEPHONE ENCOUNTER
Please call patient regarding the cancellation of her PET scan she wasn't;t sure why this had been cancelled.   [phone]

## 2020-06-30 NOTE — TELEPHONE ENCOUNTER
Patient notified of plan for CT C/A/P.  She knows not to come in for PET.   Advised to call and reschedule office visit if she does not have CT prior to appointment on 7-9-20.  Verbalized understanding.

## 2020-06-30 NOTE — TELEPHONE ENCOUNTER
----- Message from Jovita Ahmadi sent at 6/30/2020 11:22 AM EDT -----  Regarding: PET SCAN  Patient is scheduled for PET on Thursday 7/2 with diagnosis of C83.38, per reports patients last 2 PET scans were negative. Per office notes no signs of recurrence, per Medicare guidelines patient has had her 3 PET scan that are allowed and we would need evidence of disease progression to get paid for PET. Even through patient has allergy to contrast dye that does not warrant a PET    Thanks,  Mandy Ahmadi LPN  PET Compliance

## 2020-07-02 ENCOUNTER — APPOINTMENT (OUTPATIENT)
Dept: PET IMAGING | Facility: HOSPITAL | Age: 70
End: 2020-07-02

## 2020-07-06 ENCOUNTER — HOSPITAL ENCOUNTER (OUTPATIENT)
Dept: CT IMAGING | Facility: HOSPITAL | Age: 70
Discharge: HOME | End: 2020-07-06
Admitting: INTERNAL MEDICINE

## 2020-07-06 ENCOUNTER — LAB (OUTPATIENT)
Dept: LAB | Facility: HOSPITAL | Age: 70
End: 2020-07-06

## 2020-07-06 DIAGNOSIS — Z91.041 ALLERGY TO RADIOGRAPHIC DYE: ICD-10-CM

## 2020-07-06 DIAGNOSIS — C83.38 DIFFUSE LARGE B-CELL LYMPHOMA OF LYMPH NODES OF MULTIPLE REGIONS (HCC): ICD-10-CM

## 2020-07-06 LAB
ALBUMIN SERPL-MCNC: 4 G/DL (ref 3.5–5.2)
ALBUMIN/GLOB SERPL: 2 G/DL
ALP SERPL-CCNC: 80 U/L (ref 39–117)
ALT SERPL W P-5'-P-CCNC: 15 U/L (ref 1–33)
ANION GAP SERPL CALCULATED.3IONS-SCNC: 9 MMOL/L (ref 5–15)
AST SERPL-CCNC: 23 U/L (ref 1–32)
BASOPHILS # BLD AUTO: 0.04 10*3/MM3 (ref 0–0.2)
BASOPHILS NFR BLD AUTO: 1 % (ref 0–1.5)
BILIRUB SERPL-MCNC: 0.3 MG/DL (ref 0–1.2)
BUN SERPL-MCNC: 12 MG/DL (ref 8–23)
BUN/CREAT SERPL: 16.7 (ref 7–25)
CALCIUM SPEC-SCNC: 8.6 MG/DL (ref 8.6–10.5)
CHLORIDE SERPL-SCNC: 106 MMOL/L (ref 98–107)
CO2 SERPL-SCNC: 26 MMOL/L (ref 22–29)
CREAT SERPL-MCNC: 0.72 MG/DL (ref 0.57–1)
DEPRECATED RDW RBC AUTO: 42.3 FL (ref 37–54)
EOSINOPHIL # BLD AUTO: 0.18 10*3/MM3 (ref 0–0.4)
EOSINOPHIL NFR BLD AUTO: 4.5 % (ref 0.3–6.2)
ERYTHROCYTE [DISTWIDTH] IN BLOOD BY AUTOMATED COUNT: 12.3 % (ref 12.3–15.4)
GFR SERPL CREATININE-BSD FRML MDRD: 80 ML/MIN/1.73
GLOBULIN UR ELPH-MCNC: 2 GM/DL
GLUCOSE SERPL-MCNC: 91 MG/DL (ref 65–99)
HCT VFR BLD AUTO: 39.2 % (ref 34–46.6)
HGB BLD-MCNC: 13 G/DL (ref 12–15.9)
IMM GRANULOCYTES # BLD AUTO: 0 10*3/MM3 (ref 0–0.05)
IMM GRANULOCYTES NFR BLD AUTO: 0 % (ref 0–0.5)
LDH SERPL-CCNC: 220 U/L (ref 135–214)
LYMPHOCYTES # BLD AUTO: 1.13 10*3/MM3 (ref 0.7–3.1)
LYMPHOCYTES NFR BLD AUTO: 28 % (ref 19.6–45.3)
MCH RBC QN AUTO: 30.9 PG (ref 26.6–33)
MCHC RBC AUTO-ENTMCNC: 33.2 G/DL (ref 31.5–35.7)
MCV RBC AUTO: 93.1 FL (ref 79–97)
MONOCYTES # BLD AUTO: 0.54 10*3/MM3 (ref 0.1–0.9)
MONOCYTES NFR BLD AUTO: 13.4 % (ref 5–12)
NEUTROPHILS NFR BLD AUTO: 2.14 10*3/MM3 (ref 1.7–7)
NEUTROPHILS NFR BLD AUTO: 53.1 % (ref 42.7–76)
NRBC BLD AUTO-RTO: 0 /100 WBC (ref 0–0.2)
PLATELET # BLD AUTO: 132 10*3/MM3 (ref 140–450)
PMV BLD AUTO: 10.1 FL (ref 6–12)
POTASSIUM SERPL-SCNC: 4.1 MMOL/L (ref 3.5–5.2)
PROT SERPL-MCNC: 6 G/DL (ref 6–8.5)
RBC # BLD AUTO: 4.21 10*6/MM3 (ref 3.77–5.28)
SODIUM SERPL-SCNC: 141 MMOL/L (ref 136–145)
WBC # BLD AUTO: 4.03 10*3/MM3 (ref 3.4–10.8)

## 2020-07-06 PROCEDURE — 83615 LACTATE (LD) (LDH) ENZYME: CPT | Performed by: INTERNAL MEDICINE

## 2020-07-06 PROCEDURE — 74176 CT ABD & PELVIS W/O CONTRAST: CPT

## 2020-07-06 PROCEDURE — 85025 COMPLETE CBC W/AUTO DIFF WBC: CPT | Performed by: INTERNAL MEDICINE

## 2020-07-06 PROCEDURE — 71250 CT THORAX DX C-: CPT

## 2020-07-06 PROCEDURE — 80053 COMPREHEN METABOLIC PANEL: CPT | Performed by: INTERNAL MEDICINE

## 2020-07-09 ENCOUNTER — OFFICE VISIT (OUTPATIENT)
Dept: ONCOLOGY | Facility: CLINIC | Age: 70
End: 2020-07-09

## 2020-07-09 VITALS
RESPIRATION RATE: 18 BRPM | OXYGEN SATURATION: 97 % | HEIGHT: 62 IN | BODY MASS INDEX: 30.73 KG/M2 | WEIGHT: 167 LBS | SYSTOLIC BLOOD PRESSURE: 143 MMHG | TEMPERATURE: 97.5 F | DIASTOLIC BLOOD PRESSURE: 65 MMHG | HEART RATE: 76 BPM

## 2020-07-09 DIAGNOSIS — C83.38 DIFFUSE LARGE B-CELL LYMPHOMA OF LYMPH NODES OF MULTIPLE REGIONS (HCC): Primary | Chronic | ICD-10-CM

## 2020-07-09 PROCEDURE — 99214 OFFICE O/P EST MOD 30 MIN: CPT | Performed by: INTERNAL MEDICINE

## 2020-07-09 RX ORDER — ALLOPURINOL 300 MG/1
TABLET ORAL
COMMUNITY
End: 2021-01-01

## 2020-07-09 NOTE — PROGRESS NOTES
CHIEF COMPLAINT: Follow-up diffuse large B-cell lymphoma    Problem List:  Oncology/Hematology History    1. History of benign transvaginal hysterectomy 1/1/2016  2. History of reflux  3. History of transient global amnesia  4. IV dye allergy as well as allergy to barium enema  5. 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  6/8/2016 colonoscopy benign  1/2019 apparently had CT screening of the chest that showed a couple of liver abnormalities (do not have those reports).  Unable to do with contrast and IV dye allergy.  Asymptomatic.  9/5/2019 CT of the abdomen without contrast shows at least 2 hepatic lesions, 4 cm in the right lobe of the liver and 2 cm in the left lobe of the liver.  9/11/2019 PET scan shows too numerous to count increased hypermetabolism in the anterior mediastinal, subcarinal, right lower lobe, head of the pancreas, esophagus, adjacent paraesophageal lymph nodes, liver, splenic hilum, left and right adrenal glands.  Also uptake in the mesentery, right retroperitoneum, right iliac chain, periumbilical subcutaneous fat, vaginal stump, and numerous bony lesions.  The bony lesions include the spine, right shoulder, right rib,  "sacrum, and right hip.  The 1.3 cm periumbilical subcutaneous nodule appears most amenable to biopsy.  Extensive periportal and periaortic lymphadenopathy with evidence of peritoneal metastasis for which PET CT was recommended.  9/18/2019 CT-guided fine-needle aspirate of nodule within the anterior abdominal wall \"atypical\" but unable to further typify.  9/26/2019 Dr. Kline initial visit.  Reviewed PET with patient and worrisome but nondiagnostic fine-needle aspirate.  Going to get sufficient tissue not only for diagnosis but for molecular testing that could include Biotheranostics to distinguish tissue type and 15 slides for Strata testing.  We will get our navigator on board as well.  She is having some nighttime sweats without fever though she does not describe them as bed drenching but this is new for her in the last month.  The range of possibilities here are broad and include lymphoma as well as a multiplicity of carcinomas but I need more than \"atypical\" cells on a fine-needle aspirate to ultimately decide our course.  We did talk about the likelihood that what ever we are up against it is unlikely to be curative but we will wait out her risk benefit ratio once we know the pathology.  Though they may ultimately not be helpful, while we are getting tissue I will go ahead and send off a barrage of tumor markers that might help guide us to the primary.    -10/7/2019 medical oncology office visit: Preliminary path report shows T-cell rich B-cell non-Hodgkin's lymphoma.  While awaiting final pathology we will get her port and echocardiogram and plan on treating her with Rituxan CHOP with Neulasta device for her stage IV high-grade lymphoma.  She has a high risk IPI score.  We will get her LDH and uric acid today.  Risks including renal dysfunction, nausea, neuropathy, pancytopenia, cardiac dysfunction, infusion reactions, etc. were discussed in detail in addition to additional side effects as standard per all " these medicines.  She knows that if this is the T-cell rich B-cell lymphoma that we treat this like a diffuse large B-cell lymphoma and that there is a possibility but not a guarantee of cure and it is not the majority in this setting but that plan and the statistics may change if the final pathology changes.    -10/8/2019 hepatitis B panel: Negative hepatitis B core antibody, negative hepatitis B surface antigen, reactive hepatitis B surface antibody.  Will need to monitor for any elevation of liver function studies during therapy.    -10/15/2019: Communication from Dr. Damian from [place] is that the final decision is that this is T-cell rich/histiocyte rich diffuse large B cell lymphoma.  Plan to proceed with R-CHOP with cooling cap.     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  Ejection fraction 58%.  Has cooling cap Education.  There is a international shortage of vincristine.  She will get vincristine with R-CHOP course #1 today but I am hesitant to just drop vincristine from all of her subsequent regimens but we will not have any to give her.  I plan to switch her regimen to EPOCHR but have removed the vincristine from the plan.  This will make her cooling cap a moot point since this is an 4-day infusional chemotherapy.  I will admit her on November 11 through hospitalists and plan for Neulasta shot as an outpatient on November 16.    -11/22/2019 through 11/25/2019 inpatient for MRSA line infection.  Port removed.    -12/2/19 hematology oncology office visit: Will finish 4 weeks of IV antibiotics with Dr. Alvarenga on approximately 12/23/2019.  Will get PET prior to then.  Has received 1 course of R-CHOP and 1 course of EPCHR.  Vincristine is now available and we will go back to R-CHOP for course #3 but we need to finish IV antibiotics  to ensure clearance of infection.  We will see her back 12/23/2019 for R-CHOP with PET prior to return.  We will plan on using the PICC line that currently is being used for her IV antibiotics.  Has noted some bubbles in her urine that I am not sure the significance.  No josiane evidence of fistula.    -12/23/2019 hematology oncology office visit: Prior bone and adenopathy abnormalities on PET resolved on images I reviewed and reports thereof.  Vincristine now available.  This is course #3 of treatment (R-CHOP today) we will rescan after 2 more courses but plan on a total of 6 courses given the stage IV presentation with bone involvement.  We will give 1 extra week to help her clear the infection thoroughly.  She stopped antibiotics 2 days ago intravenously.  Rescan every couple of courses.    -2/3/2020 PET negative    -7/6/2020 PET negative        Diffuse large B-cell lymphoma of lymph nodes of multiple regions (CMS/HCC)     Initial Diagnosis     Diffuse large B-cell lymphoma of lymph nodes of multiple regions (CMS/HCC)      9/11/2019 Imaging     PET/CT IMPRESSION:  There are too numerous to count hypermetabolic lesions as  described above involving multiple soft tissues and bony structures.  There is no prior exam for comparison. Although many sites are amenable  to percutaneous biopsy or excision, there is a periumbilical  subcutaneous nodule measuring 1.1 x 1.3 cm which would be the least  risky option.      10/21/2019 -  Chemotherapy     Course #1 R-CHOP 10/21/2019  Course #2 etoposide substitution for vincristine x1 due to vincristine shortage 11/11/2019-11/15/2019  Course #3 resumed R-CHOP with vincristine now available 12/30/2019   Course #4 R-CHOP 1/20/2020 11/22/2019 Adverse Reaction     Admitted to [facility] with fever in port site infection.  Treated on empiric antibiotics.  Port-A-Cath removed 11/23/2019, PICC line was placed.      12/13/2019 Imaging     PET is positive at the old  "port site but otherwise prior lymphadenopathy and bony abnormalities resolved       2/3/2020 Imaging     2/3/2020 PET negative      3/23/2020 Imaging     PET/CT IMPRESSION:  Stable negative PET/CT scan. No evidence of active or  recurrent lymphoma.      7/6/2020 Imaging     -7/6/2020 CT chest abdomen pelvis without contrast showed no evidence of disease.  Platelets 132,000 otherwise unremarkable CBC or CMP.  LDH still slightly elevated at 220.         HISTORY OF PRESENT ILLNESS:  The patient is a 70 y.o. female, here for follow up on management of follow-up diffuse large B-cell lymphoma.  Feeling fit.      Past Medical History:   Diagnosis Date   • Abnormal Pap smear of vagina     \"Abnormal pap smear\"   • Anxiety    • Cervical dysplasia    • Cystocele    • GERD (gastroesophageal reflux disease)     INTERMITTENT- NOT MEDICATION   • Migraine headache    • Osteopenia    • Transient global amnesia     fall 2015     Past Surgical History:   Procedure Laterality Date   • BREAST CYST ASPIRATION     • BREAST SURGERY      biopsy x 3   • CERVICAL BIOPSY      PT UNAWARE OF CERVIX BIOPSY   • IMPACTED THIRD MOLAR REMOVAL      wisdom teeth extracted in which two partially impacted   • OTHER SURGICAL HISTORY  10/02/2019    surgical bx of abd wall tumor   • PERIPHERALLY INSERTED CENTRAL CATHETER INSERTION Left 11/2019   • TONSILLECTOMY     • VAGINAL HYSTERECTOMY      with anterior repair   • VAGINAL HYSTERECTOMY W/ ANTERIOR AND POSTERIOR VAGINAL REPAIR N/A 11/15/2016    Procedure: VAGINAL HYSTERECTOMY WITH ANTERIOR VAGINAL REPAIR;  Surgeon: Henry Pitt MD;  Location:  [facility] OR;  Service:    • VENOUS ACCESS DEVICE (PORT) REMOVAL N/A 11/23/2019    Procedure: REMOVAL VENOUS ACCESS DEVICE;  Surgeon: Primo Swenson MD;  Location:  [facility] OR;  Service: General       Allergies   Allergen Reactions   • Betadine [Povidone Iodine]    • Barium-Containing Compounds Unknown (See Comments)     unknown   • Codeine    • Contrast Dye " "Angioedema     Unknown   • E-Mycin [Erythromycin]    • Epinephrine    • Iodine    • Lidocaine Other (See Comments)     PT states when PICC was being placed and Lidocaine was being used she had a tightness feeling around her neck.  PT thought her gown was getting tight around her neck and when she went to pull the gown down, it was not around her neck.    • Other      MSG, Rema, Preservatives in eye ointment   • Procaine Other (See Comments)     DENTAL ANASTHESIA CAUSED HEART RACING- YRS AGO\"   • Sudafed [Pseudoephedrine Hcl] Palpitations       Family History and Social History reviewed and changed as necessary      REVIEW OF SYSTEM:   Review of Systems   Constitutional: Negative for appetite change, chills, diaphoresis, fatigue, fever and unexpected weight change.   HENT:   Negative for mouth sores, sore throat and trouble swallowing.    Eyes: Negative for icterus.   Respiratory: Negative for cough, hemoptysis and shortness of breath.    Cardiovascular: Negative for chest pain, leg swelling and palpitations.   Gastrointestinal: Negative for abdominal distention, abdominal pain, blood in stool, constipation, diarrhea, nausea and vomiting.   Endocrine: Negative for hot flashes.   Genitourinary: Negative for bladder incontinence, difficulty urinating, dysuria, frequency and hematuria.    Musculoskeletal: Negative for gait problem, neck pain and neck stiffness.   Skin: Negative for rash.   Neurological: Negative for dizziness, gait problem, headaches, light-headedness and numbness.   Hematological: Negative for adenopathy. Does not bruise/bleed easily.   Psychiatric/Behavioral: Negative for depression. The patient is not nervous/anxious.    All other systems reviewed and are negative.       PHYSICAL EXAM    Vitals:    07/09/20 1135   BP: 143/65   Pulse: 76   Resp: 18   Temp: 97.5 °F (36.4 °C)   SpO2: 97%   Weight: 75.8 kg (167 lb)   Height: 157.5 cm (62\")     Vitals:    07/09/20 1135   PainSc: 0-No pain      "   Constitutional: Appears well-developed and well-nourished. No distress.   ECOG: (1) Restricted in Physically Strenuous Activity, Ambulatory & Able to Do Work of Light Nature  HENT:   Head: Normocephalic.   Mouth/Throat: Oropharynx is clear and moist.   Eyes: Conjunctivae are normal. Pupils are equal, round, and reactive to light. No scleral icterus.   Neck: Neck supple. No JVD present. No thyromegaly present.   Cardiovascular: Normal rate, regular rhythm and normal heart sounds.    Pulmonary/Chest: Breath sounds normal. No respiratory distress.   Abdominal: Soft. Exhibits no distension and no mass. There is no hepatosplenomegaly. There is no tenderness. There is no rebound and no guarding.   Musculoskeletal:Exhibits no edema, tenderness or deformity.   Neurological: Alert and oriented to person, place, and time. Exhibits normal muscle tone.   Skin: No ecchymosis, no petechiae and no rash noted. Not diaphoretic. No cyanosis. Nails show no clubbing.   Psychiatric: Normal mood and affect.   Vitals reviewed.      Lab Results   Component Value Date    HGB 13.0 07/06/2020    HCT 39.2 07/06/2020    MCV 93.1 07/06/2020     (L) 07/06/2020    WBC 4.03 07/06/2020    NEUTROABS 2.14 07/06/2020    LYMPHSABS 1.13 07/06/2020    MONOSABS 0.54 07/06/2020    EOSABS 0.18 07/06/2020    BASOSABS 0.04 07/06/2020       Lab Results   Component Value Date    GLUCOSE 91 07/06/2020    BUN 12 07/06/2020    CREATININE 0.72 07/06/2020     07/06/2020    K 4.1 07/06/2020     07/06/2020    CO2 26.0 07/06/2020    CALCIUM 8.6 07/06/2020    PROTEINTOT 6.0 07/06/2020    ALBUMIN 4.00 07/06/2020    BILITOT 0.3 07/06/2020    ALKPHOS 80 07/06/2020    AST 23 07/06/2020    ALT 15 07/06/2020                   ASSESSMENT & PLAN:  1. NHL t cell rich/ histiocyte rich DLB cell:  CORAL on PET. Hgb 11.1.   (). No B sx.  Reviewed Pet and images thereof. CORAL. Nl CMP.   2.  Subungual hematoma: Watch off chemo.      Discussion: No  evidence of disease on CT chest abdomen pelvis without contrast as they would not allow PET.  Will check noncontrast CT chest every 6 months for another 2 years along with CBC, CMP, and LDH.  Discussed with patient face-to-face 30 minutes.  50% spent counseling regarding this plan as outlined above        Ranulfo Kline MD    07/09/2020

## 2021-01-01 ENCOUNTER — OFFICE VISIT (OUTPATIENT)
Dept: ONCOLOGY | Facility: CLINIC | Age: 71
End: 2021-01-01

## 2021-01-01 ENCOUNTER — TELEPHONE (OUTPATIENT)
Dept: ONCOLOGY | Facility: CLINIC | Age: 71
End: 2021-01-01

## 2021-01-01 ENCOUNTER — LAB (OUTPATIENT)
Dept: LAB | Facility: HOSPITAL | Age: 71
End: 2021-01-01

## 2021-01-01 ENCOUNTER — TELEPHONE (OUTPATIENT)
Dept: INFUSION THERAPY | Facility: HOSPITAL | Age: 71
End: 2021-01-01

## 2021-01-01 ENCOUNTER — HOSPITAL ENCOUNTER (OUTPATIENT)
Dept: CT IMAGING | Facility: HOSPITAL | Age: 71
Discharge: HOME | End: 2021-08-26
Admitting: RADIOLOGY

## 2021-01-01 ENCOUNTER — HOSPITAL ENCOUNTER (OUTPATIENT)
Dept: CT IMAGING | Facility: HOSPITAL | Age: 71
Discharge: HOME | End: 2021-07-06

## 2021-01-01 VITALS
OXYGEN SATURATION: 97 % | WEIGHT: 162 LBS | SYSTOLIC BLOOD PRESSURE: 143 MMHG | DIASTOLIC BLOOD PRESSURE: 65 MMHG | RESPIRATION RATE: 16 BRPM | BODY MASS INDEX: 29.81 KG/M2 | OXYGEN SATURATION: 98 % | BODY MASS INDEX: 28.89 KG/M2 | SYSTOLIC BLOOD PRESSURE: 132 MMHG | HEART RATE: 87 BPM | DIASTOLIC BLOOD PRESSURE: 68 MMHG | HEIGHT: 62 IN | TEMPERATURE: 98.4 F | HEIGHT: 62 IN | RESPIRATION RATE: 16 BRPM | WEIGHT: 157 LBS | HEART RATE: 97 BPM | TEMPERATURE: 96.6 F

## 2021-01-01 VITALS
HEART RATE: 77 BPM | BODY MASS INDEX: 28.52 KG/M2 | WEIGHT: 155 LBS | RESPIRATION RATE: 18 BRPM | SYSTOLIC BLOOD PRESSURE: 109 MMHG | HEIGHT: 62 IN | DIASTOLIC BLOOD PRESSURE: 50 MMHG | OXYGEN SATURATION: 99 % | TEMPERATURE: 97.3 F

## 2021-01-01 VITALS
WEIGHT: 151.8 LBS | DIASTOLIC BLOOD PRESSURE: 67 MMHG | TEMPERATURE: 97.1 F | HEIGHT: 62 IN | SYSTOLIC BLOOD PRESSURE: 127 MMHG | HEART RATE: 94 BPM | OXYGEN SATURATION: 97 % | BODY MASS INDEX: 27.94 KG/M2 | RESPIRATION RATE: 18 BRPM

## 2021-01-01 DIAGNOSIS — Z85.72 HISTORY OF DIFFUSE LARGE B-CELL LYMPHOMA: ICD-10-CM

## 2021-01-01 DIAGNOSIS — D61.810 PANCYTOPENIA DUE TO CHEMOTHERAPY (HCC): ICD-10-CM

## 2021-01-01 DIAGNOSIS — Z85.72 HISTORY OF DIFFUSE LARGE B-CELL LYMPHOMA: Primary | ICD-10-CM

## 2021-01-01 DIAGNOSIS — C92.00 ACUTE MYELOID LEUKEMIA NOT HAVING ACHIEVED REMISSION (HCC): Primary | ICD-10-CM

## 2021-01-01 DIAGNOSIS — Z85.72 HISTORY OF NON-HODGKIN'S LYMPHOMA: Primary | ICD-10-CM

## 2021-01-01 DIAGNOSIS — Z85.72 HISTORY OF NON-HODGKIN'S LYMPHOMA: Primary | Chronic | ICD-10-CM

## 2021-01-01 DIAGNOSIS — Z85.72 HISTORY OF NON-HODGKIN'S LYMPHOMA: ICD-10-CM

## 2021-01-01 LAB
ALBUMIN SERPL-MCNC: 4.2 G/DL (ref 3.5–5.2)
ALBUMIN SERPL-MCNC: 4.3 G/DL (ref 3.5–5.2)
ALBUMIN/GLOB SERPL: 2.1 G/DL
ALBUMIN/GLOB SERPL: 2.4 G/DL
ALP SERPL-CCNC: 59 U/L (ref 39–117)
ALP SERPL-CCNC: 75 U/L (ref 39–117)
ALT SERPL W P-5'-P-CCNC: 10 U/L (ref 1–33)
ALT SERPL W P-5'-P-CCNC: 11 U/L (ref 1–33)
ANION GAP SERPL CALCULATED.3IONS-SCNC: 10 MMOL/L (ref 5–15)
ANION GAP SERPL CALCULATED.3IONS-SCNC: 11 MMOL/L (ref 5–15)
ANISOCYTOSIS BLD QL: ABNORMAL
ANISOCYTOSIS BLD QL: ABNORMAL
ANISOCYTOSIS BLD QL: NORMAL
AST SERPL-CCNC: 19 U/L (ref 1–32)
AST SERPL-CCNC: 20 U/L (ref 1–32)
BASOPHILS # BLD AUTO: 0.01 10*3/MM3 (ref 0–0.2)
BASOPHILS # BLD AUTO: 0.02 10*3/MM3 (ref 0–0.2)
BASOPHILS # BLD MANUAL: 0 10*3/MM3 (ref 0–0.2)
BASOPHILS # BLD MANUAL: 0 10*3/MM3 (ref 0–0.2)
BASOPHILS NFR BLD AUTO: 0 % (ref 0–1.5)
BASOPHILS NFR BLD AUTO: 0 % (ref 0–1.5)
BASOPHILS NFR BLD AUTO: 0.4 % (ref 0–1.5)
BASOPHILS NFR BLD AUTO: 0.6 % (ref 0–1.5)
BILIRUB SERPL-MCNC: 0.9 MG/DL (ref 0–1.2)
BILIRUB SERPL-MCNC: 1.1 MG/DL (ref 0–1.2)
BUN SERPL-MCNC: 10 MG/DL (ref 8–23)
BUN SERPL-MCNC: 12 MG/DL (ref 8–23)
BUN/CREAT SERPL: 14 (ref 7–25)
BUN/CREAT SERPL: 14.1 (ref 7–25)
CALCIUM SPEC-SCNC: 9.2 MG/DL (ref 8.6–10.5)
CALCIUM SPEC-SCNC: 9.3 MG/DL (ref 8.6–10.5)
CHLORIDE SERPL-SCNC: 104 MMOL/L (ref 98–107)
CHLORIDE SERPL-SCNC: 104 MMOL/L (ref 98–107)
CO2 SERPL-SCNC: 25 MMOL/L (ref 22–29)
CO2 SERPL-SCNC: 26 MMOL/L (ref 22–29)
CREAT SERPL-MCNC: 0.71 MG/DL (ref 0.57–1)
CREAT SERPL-MCNC: 0.86 MG/DL (ref 0.57–1)
CYTO UR: NORMAL
CYTO UR: NORMAL
DEPRECATED RDW RBC AUTO: 46.2 FL (ref 37–54)
DEPRECATED RDW RBC AUTO: 52.6 FL (ref 37–54)
DEPRECATED RDW RBC AUTO: 55.1 FL (ref 37–54)
DEPRECATED RDW RBC AUTO: 64.8 FL (ref 37–54)
DX PRELIMINARY: NORMAL
EOSINOPHIL # BLD AUTO: 0 10*3/MM3 (ref 0–0.4)
EOSINOPHIL # BLD AUTO: 0.02 10*3/MM3 (ref 0–0.4)
EOSINOPHIL # BLD MANUAL: 0 10*3/MM3 (ref 0–0.4)
EOSINOPHIL # BLD MANUAL: 0.02 10*3/MM3 (ref 0–0.4)
EOSINOPHIL NFR BLD AUTO: 0 % (ref 0.3–6.2)
EOSINOPHIL NFR BLD AUTO: 0.6 % (ref 0.3–6.2)
EOSINOPHIL NFR BLD MANUAL: 0 % (ref 0.3–6.2)
EOSINOPHIL NFR BLD MANUAL: 1 % (ref 0.3–6.2)
ERYTHROCYTE [DISTWIDTH] IN BLOOD BY AUTOMATED COUNT: 13.5 % (ref 12.3–15.4)
ERYTHROCYTE [DISTWIDTH] IN BLOOD BY AUTOMATED COUNT: 15.2 % (ref 12.3–15.4)
ERYTHROCYTE [DISTWIDTH] IN BLOOD BY AUTOMATED COUNT: 15.8 % (ref 12.3–15.4)
ERYTHROCYTE [DISTWIDTH] IN BLOOD BY AUTOMATED COUNT: 17.7 % (ref 12.3–15.4)
ERYTHROCYTE [DISTWIDTH] IN BLOOD BY AUTOMATED COUNT: 17.8 % (ref 12.3–15.4)
GFR SERPL CREATININE-BSD FRML MDRD: 65 ML/MIN/1.73
GFR SERPL CREATININE-BSD FRML MDRD: 81 ML/MIN/1.73
GLOBULIN UR ELPH-MCNC: 1.8 GM/DL
GLOBULIN UR ELPH-MCNC: 2 GM/DL
GLUCOSE SERPL-MCNC: 100 MG/DL (ref 65–99)
GLUCOSE SERPL-MCNC: 94 MG/DL (ref 65–99)
HCT VFR BLD AUTO: 35.9 % (ref 34–46.6)
HCT VFR BLD AUTO: 36.6 % (ref 34–46.6)
HCT VFR BLD AUTO: 36.6 % (ref 34–46.6)
HCT VFR BLD AUTO: 36.7 % (ref 34–46.6)
HCT VFR BLD AUTO: 37.2 % (ref 34–46.6)
HGB BLD-MCNC: 11.9 G/DL (ref 12–15.9)
HGB BLD-MCNC: 12.1 G/DL (ref 12–15.9)
HGB BLD-MCNC: 12.2 G/DL (ref 12–15.9)
HGB BLD-MCNC: 12.3 G/DL (ref 12–15.9)
HGB BLD-MCNC: 12.6 G/DL (ref 12–15.9)
HOWELL-JOLLY BOD BLD QL SMEAR: PRESENT
IMM GRANULOCYTES # BLD AUTO: 0.01 10*3/MM3 (ref 0–0.05)
IMM GRANULOCYTES # BLD AUTO: 0.01 10*3/MM3 (ref 0–0.05)
IMM GRANULOCYTES NFR BLD AUTO: 0.3 % (ref 0–0.5)
IMM GRANULOCYTES NFR BLD AUTO: 0.4 % (ref 0–0.5)
INR PPP: 1.01 (ref 0.85–1.16)
LAB AP ASPIRATE SMEAR: NORMAL
LAB AP CASE REPORT: NORMAL
LAB AP CASE REPORT: NORMAL
LAB AP CBC AND DIFFERENTIAL: NORMAL
LAB AP CLINICAL INFORMATION: NORMAL
LAB AP CLINICAL INFORMATION: NORMAL
LAB AP CLOT SECTION: NORMAL
LAB AP CORE BIOPSY: NORMAL
LAB AP CYTOGENETICS REPORT,ADDENDUM: NORMAL
LAB AP DIAGNOSIS COMMENT: NORMAL
LAB AP DIAGNOSIS COMMENT: NORMAL
LAB AP FLOW CYTOMETRY SUMMARY: NORMAL
LAB AP INTEGRATED ONCOLOGY, ADDENDUM: NORMAL
LAB AP OUTSIDE REPORT, ADDENDUM: NORMAL
LARGE PLATELETS: ABNORMAL
LDH SERPL-CCNC: 221 U/L (ref 135–214)
LDH SERPL-CCNC: 240 U/L (ref 135–214)
LYMPHOCYTES # BLD AUTO: 0.82 10*3/MM3 (ref 0.7–3.1)
LYMPHOCYTES # BLD AUTO: 0.92 10*3/MM3 (ref 0.7–3.1)
LYMPHOCYTES # BLD AUTO: 1.1 10*3/MM3 (ref 0.7–3.1)
LYMPHOCYTES # BLD MANUAL: 0.62 10*3/MM3 (ref 0.7–3.1)
LYMPHOCYTES # BLD MANUAL: 0.7 10*3/MM3 (ref 0.7–3.1)
LYMPHOCYTES NFR BLD AUTO: 25.6 % (ref 19.6–45.3)
LYMPHOCYTES NFR BLD AUTO: 35.5 % (ref 19.6–45.3)
LYMPHOCYTES NFR BLD AUTO: 47.7 % (ref 19.6–45.3)
LYMPHOCYTES NFR BLD MANUAL: 23 % (ref 19.6–45.3)
LYMPHOCYTES NFR BLD MANUAL: 28 % (ref 19.6–45.3)
LYMPHOCYTES NFR BLD MANUAL: 49 % (ref 5–12)
LYMPHOCYTES NFR BLD MANUAL: 53 % (ref 5–12)
MACROCYTES BLD QL SMEAR: NORMAL
MCH RBC QN AUTO: 32.4 PG (ref 26.6–33)
MCH RBC QN AUTO: 32.7 PG (ref 26.6–33)
MCH RBC QN AUTO: 33 PG (ref 26.6–33)
MCH RBC QN AUTO: 33.2 PG (ref 26.6–33)
MCH RBC QN AUTO: 34.7 PG (ref 26.6–33)
MCHC RBC AUTO-ENTMCNC: 32.5 G/DL (ref 31.5–35.7)
MCHC RBC AUTO-ENTMCNC: 33.3 G/DL (ref 31.5–35.7)
MCHC RBC AUTO-ENTMCNC: 33.6 G/DL (ref 31.5–35.7)
MCHC RBC AUTO-ENTMCNC: 33.7 G/DL (ref 31.5–35.7)
MCHC RBC AUTO-ENTMCNC: 33.9 G/DL (ref 31.5–35.7)
MCV RBC AUTO: 100.6 FL (ref 79–97)
MCV RBC AUTO: 102.5 FL (ref 79–97)
MCV RBC AUTO: 96.2 FL (ref 79–97)
MCV RBC AUTO: 98.9 FL (ref 79–97)
MCV RBC AUTO: 98.9 FL (ref 79–97)
METAMYELOCYTES NFR BLD MANUAL: 1 % (ref 0–0)
MICROCYTES BLD QL: ABNORMAL
MONOCYTES # BLD AUTO: 0.6 10*3/MM3 (ref 0.1–0.9)
MONOCYTES # BLD AUTO: 1.02 10*3/MM3 (ref 0.1–0.9)
MONOCYTES # BLD AUTO: 1.17 10*3/MM3 (ref 0.1–0.9)
MONOCYTES # BLD AUTO: 1.27 10*3/MM3 (ref 0.1–0.9)
MONOCYTES # BLD AUTO: 1.43 10*3/MM3 (ref 0.1–0.9)
MONOCYTES NFR BLD AUTO: 24.8 % (ref 5–12)
MONOCYTES NFR BLD AUTO: 31.9 % (ref 5–12)
MONOCYTES NFR BLD AUTO: 55.2 % (ref 5–12)
NEUTROPHILS # BLD AUTO: 0.41 10*3/MM3 (ref 1.7–7)
NEUTROPHILS # BLD AUTO: 0.57 10*3/MM3 (ref 1.7–7)
NEUTROPHILS NFR BLD AUTO: 0.22 10*3/MM3 (ref 1.7–7)
NEUTROPHILS NFR BLD AUTO: 0.6 10*3/MM3 (ref 1.7–7)
NEUTROPHILS NFR BLD AUTO: 1.31 10*3/MM3 (ref 1.7–7)
NEUTROPHILS NFR BLD AUTO: 27.5 % (ref 42.7–76)
NEUTROPHILS NFR BLD AUTO: 41 % (ref 42.7–76)
NEUTROPHILS NFR BLD AUTO: 8.5 % (ref 42.7–76)
NEUTROPHILS NFR BLD MANUAL: 16 % (ref 42.7–76)
NEUTROPHILS NFR BLD MANUAL: 23 % (ref 42.7–76)
NEUTS BAND NFR BLD MANUAL: 1 % (ref 0–5)
NEUTS BAND NFR BLD MANUAL: 1 % (ref 0–5)
NRBC BLD AUTO-RTO: 0 /100 WBC (ref 0–0.2)
NRBC BLD AUTO-RTO: 1.9 /100 WBC (ref 0–0.2)
NRBC SPEC MANUAL: 2 /100 WBC (ref 0–0.2)
OVALOCYTES BLD QL SMEAR: NORMAL
PATH REPORT.FINAL DX SPEC: NORMAL
PATH REPORT.FINAL DX SPEC: NORMAL
PATH REPORT.GROSS SPEC: NORMAL
PATH REPORT.GROSS SPEC: NORMAL
PLAT MORPH BLD: NORMAL
PLATELET # BLD AUTO: 107 10*3/MM3 (ref 140–450)
PLATELET # BLD AUTO: 73 10*3/MM3 (ref 140–450)
PLATELET # BLD AUTO: 75 10*3/MM3 (ref 140–450)
PLATELET # BLD AUTO: 83 10*3/MM3 (ref 140–450)
PLATELET # BLD AUTO: 90 10*3/MM3 (ref 140–450)
PMV BLD AUTO: 11.6 FL (ref 6–12)
PMV BLD AUTO: 8.2 FL (ref 6–12)
POLYCHROMASIA BLD QL SMEAR: ABNORMAL
POLYCHROMASIA BLD QL SMEAR: ABNORMAL
POLYCHROMASIA BLD QL SMEAR: NORMAL
POTASSIUM SERPL-SCNC: 3.7 MMOL/L (ref 3.5–5.2)
POTASSIUM SERPL-SCNC: 3.9 MMOL/L (ref 3.5–5.2)
PROT SERPL-MCNC: 6.1 G/DL (ref 6–8.5)
PROT SERPL-MCNC: 6.2 G/DL (ref 6–8.5)
PROTHROMBIN TIME: 13 SECONDS (ref 11.4–14.4)
RBC # BLD AUTO: 3.63 10*6/MM3 (ref 3.77–5.28)
RBC # BLD AUTO: 3.64 10*6/MM3 (ref 3.77–5.28)
RBC # BLD AUTO: 3.7 10*6/MM3 (ref 3.77–5.28)
RBC # BLD AUTO: 3.7 10*6/MM3 (ref 3.77–5.28)
RBC # BLD AUTO: 3.73 10*6/MM3 (ref 3.77–5.28)
SODIUM SERPL-SCNC: 139 MMOL/L (ref 136–145)
SODIUM SERPL-SCNC: 141 MMOL/L (ref 136–145)
URATE SERPL-MCNC: 4.5 MG/DL (ref 2.4–5.7)
VARIANT LYMPHS NFR BLD MANUAL: 1 % (ref 0–5)
VARIANT LYMPHS NFR BLD MANUAL: 3 % (ref 0–5)
WBC # BLD AUTO: 2.3 10*3/MM3 (ref 3.4–10.8)
WBC # BLD AUTO: 2.38 10*3/MM3 (ref 3.4–10.8)
WBC # BLD AUTO: 2.4 10*3/MM3 (ref 3.4–10.8)
WBC # BLD AUTO: 2.59 10*3/MM3 (ref 3.4–10.8)
WBC # BLD AUTO: 3.2 10*3/MM3 (ref 3.4–10.8)
WBC MORPH BLD: NORMAL

## 2021-01-01 PROCEDURE — 80053 COMPREHEN METABOLIC PANEL: CPT

## 2021-01-01 PROCEDURE — 85025 COMPLETE CBC W/AUTO DIFF WBC: CPT

## 2021-01-01 PROCEDURE — 88313 SPECIAL STAINS GROUP 2: CPT | Performed by: INTERNAL MEDICINE

## 2021-01-01 PROCEDURE — 85097 BONE MARROW INTERPRETATION: CPT | Performed by: INTERNAL MEDICINE

## 2021-01-01 PROCEDURE — 36415 COLL VENOUS BLD VENIPUNCTURE: CPT

## 2021-01-01 PROCEDURE — 25010000002 DIPHENHYDRAMINE PER 50 MG

## 2021-01-01 PROCEDURE — 77012 CT SCAN FOR NEEDLE BIOPSY: CPT

## 2021-01-01 PROCEDURE — 83615 LACTATE (LD) (LDH) ENZYME: CPT

## 2021-01-01 PROCEDURE — 84550 ASSAY OF BLOOD/URIC ACID: CPT

## 2021-01-01 PROCEDURE — 74176 CT ABD & PELVIS W/O CONTRAST: CPT

## 2021-01-01 PROCEDURE — 25010000002 MIDAZOLAM PER 1 MG: Performed by: RADIOLOGY

## 2021-01-01 PROCEDURE — 88341 IMHCHEM/IMCYTCHM EA ADD ANTB: CPT | Performed by: INTERNAL MEDICINE

## 2021-01-01 PROCEDURE — 99214 OFFICE O/P EST MOD 30 MIN: CPT | Performed by: NURSE PRACTITIONER

## 2021-01-01 PROCEDURE — 85007 BL SMEAR W/DIFF WBC COUNT: CPT

## 2021-01-01 PROCEDURE — 99214 OFFICE O/P EST MOD 30 MIN: CPT | Performed by: INTERNAL MEDICINE

## 2021-01-01 PROCEDURE — 99153 MOD SED SAME PHYS/QHP EA: CPT

## 2021-01-01 PROCEDURE — 88342 IMHCHEM/IMCYTCHM 1ST ANTB: CPT | Performed by: INTERNAL MEDICINE

## 2021-01-01 PROCEDURE — 99152 MOD SED SAME PHYS/QHP 5/>YRS: CPT

## 2021-01-01 PROCEDURE — 25010000002 FENTANYL CITRATE (PF) 50 MCG/ML SOLUTION: Performed by: RADIOLOGY

## 2021-01-01 PROCEDURE — 85610 PROTHROMBIN TIME: CPT | Performed by: RADIOLOGY

## 2021-01-01 PROCEDURE — 85007 BL SMEAR W/DIFF WBC COUNT: CPT | Performed by: RADIOLOGY

## 2021-01-01 PROCEDURE — 88305 TISSUE EXAM BY PATHOLOGIST: CPT | Performed by: INTERNAL MEDICINE

## 2021-01-01 PROCEDURE — 88311 DECALCIFY TISSUE: CPT | Performed by: INTERNAL MEDICINE

## 2021-01-01 PROCEDURE — 85025 COMPLETE CBC W/AUTO DIFF WBC: CPT | Performed by: RADIOLOGY

## 2021-01-01 PROCEDURE — 71250 CT THORAX DX C-: CPT

## 2021-01-01 RX ORDER — FAMOTIDINE 20 MG/1
20 TABLET, FILM COATED ORAL 2 TIMES DAILY
COMMUNITY

## 2021-01-01 RX ORDER — FENTANYL CITRATE 50 UG/ML
INJECTION, SOLUTION INTRAMUSCULAR; INTRAVENOUS
Status: DISPENSED
Start: 2021-01-01 | End: 2021-01-01

## 2021-01-01 RX ORDER — MIDAZOLAM HYDROCHLORIDE 1 MG/ML
INJECTION INTRAMUSCULAR; INTRAVENOUS
Status: COMPLETED | OUTPATIENT
Start: 2021-01-01 | End: 2021-01-01

## 2021-01-01 RX ORDER — ACYCLOVIR 800 MG/1
1 TABLET ORAL 2 TIMES DAILY
COMMUNITY
Start: 2021-01-01

## 2021-01-01 RX ORDER — FENTANYL CITRATE 50 UG/ML
INJECTION, SOLUTION INTRAMUSCULAR; INTRAVENOUS
Status: COMPLETED | OUTPATIENT
Start: 2021-01-01 | End: 2021-01-01

## 2021-01-01 RX ORDER — MIDAZOLAM HYDROCHLORIDE 1 MG/ML
INJECTION INTRAMUSCULAR; INTRAVENOUS
Status: DISPENSED
Start: 2021-01-01 | End: 2021-01-01

## 2021-01-01 RX ORDER — FLUCONAZOLE 200 MG/1
2 TABLET ORAL DAILY
COMMUNITY
Start: 2021-01-01

## 2021-01-01 RX ORDER — CHLOROPROCAINE HYDROCHLORIDE 30 MG/ML
2 INJECTION, SOLUTION EPIDURAL; INFILTRATION; INTRACAUDAL; PERINEURAL ONCE
Status: DISCONTINUED | OUTPATIENT
Start: 2021-01-01 | End: 2021-01-01 | Stop reason: HOSPADM

## 2021-01-01 RX ORDER — LEVOFLOXACIN 500 MG/1
1 TABLET, FILM COATED ORAL DAILY
COMMUNITY
Start: 2021-01-01

## 2021-01-01 RX ORDER — DIPHENHYDRAMINE HYDROCHLORIDE 50 MG/ML
INJECTION INTRAMUSCULAR; INTRAVENOUS
Status: COMPLETED
Start: 2021-01-01 | End: 2021-01-01

## 2021-01-01 RX ORDER — L.ACID,CASEI/B.ANIMAL/S.THERMO 16B CELL
1 CAPSULE ORAL DAILY
COMMUNITY

## 2021-01-01 RX ADMIN — DIPHENHYDRAMINE HYDROCHLORIDE: 50 INJECTION INTRAMUSCULAR; INTRAVENOUS at 08:54

## 2021-01-01 RX ADMIN — MIDAZOLAM HYDROCHLORIDE 0.5 MG: 1 INJECTION, SOLUTION INTRAMUSCULAR; INTRAVENOUS at 08:53

## 2021-01-01 RX ADMIN — FENTANYL CITRATE 50 MCG: 0.05 INJECTION, SOLUTION INTRAMUSCULAR; INTRAVENOUS at 08:53

## 2021-01-07 ENCOUNTER — TELEPHONE (OUTPATIENT)
Dept: ONCOLOGY | Facility: CLINIC | Age: 71
End: 2021-01-07

## 2021-01-07 NOTE — TELEPHONE ENCOUNTER
----- Message from COLLEEN Hills sent at 1/7/2021  2:58 PM EST -----  Regarding: f/u  Please see if she wants to do video visit Tuesday.  Manju

## 2021-01-08 ENCOUNTER — TELEPHONE (OUTPATIENT)
Dept: ONCOLOGY | Facility: CLINIC | Age: 71
End: 2021-01-08

## 2021-01-08 NOTE — TELEPHONE ENCOUNTER
Nell says someone had told her that she would be receiving contrast with her CT on 01/11. I told her that she's not scheduled to have contrast. She's asking for someone to call her to verify that she won't have contrast    Ph# [phone]

## 2021-01-08 NOTE — TELEPHONE ENCOUNTER
Discussed with Dr. Kline, CT scans are without IV contrast but with oral contrast.  Called patient and she verbalized understanding.

## 2021-01-11 ENCOUNTER — HOSPITAL ENCOUNTER (OUTPATIENT)
Dept: CT IMAGING | Facility: HOSPITAL | Age: 71
Discharge: HOME | End: 2021-01-11

## 2021-01-11 ENCOUNTER — LAB (OUTPATIENT)
Dept: LAB | Facility: HOSPITAL | Age: 71
End: 2021-01-11

## 2021-01-11 DIAGNOSIS — C83.38 DIFFUSE LARGE B-CELL LYMPHOMA OF LYMPH NODES OF MULTIPLE REGIONS (HCC): Chronic | ICD-10-CM

## 2021-01-11 DIAGNOSIS — C83.38 DIFFUSE LARGE B-CELL LYMPHOMA OF LYMPH NODES OF MULTIPLE REGIONS (HCC): ICD-10-CM

## 2021-01-11 LAB
ALBUMIN SERPL-MCNC: 4 G/DL (ref 3.5–5.2)
ALBUMIN/GLOB SERPL: 1.6 G/DL
ALP SERPL-CCNC: 87 U/L (ref 39–117)
ALT SERPL W P-5'-P-CCNC: 13 U/L (ref 1–33)
ANION GAP SERPL CALCULATED.3IONS-SCNC: 9 MMOL/L (ref 5–15)
AST SERPL-CCNC: 16 U/L (ref 1–32)
BASOPHILS # BLD AUTO: 0.05 10*3/MM3 (ref 0–0.2)
BASOPHILS NFR BLD AUTO: 1.2 % (ref 0–1.5)
BILIRUB SERPL-MCNC: 0.4 MG/DL (ref 0–1.2)
BUN SERPL-MCNC: 15 MG/DL (ref 8–23)
BUN/CREAT SERPL: 18.5 (ref 7–25)
CALCIUM SPEC-SCNC: 9.5 MG/DL (ref 8.6–10.5)
CHLORIDE SERPL-SCNC: 105 MMOL/L (ref 98–107)
CO2 SERPL-SCNC: 28 MMOL/L (ref 22–29)
CREAT SERPL-MCNC: 0.81 MG/DL (ref 0.57–1)
DEPRECATED RDW RBC AUTO: 43.6 FL (ref 37–54)
EOSINOPHIL # BLD AUTO: 0.18 10*3/MM3 (ref 0–0.4)
EOSINOPHIL NFR BLD AUTO: 4.5 % (ref 0.3–6.2)
ERYTHROCYTE [DISTWIDTH] IN BLOOD BY AUTOMATED COUNT: 11.9 % (ref 12.3–15.4)
GFR SERPL CREATININE-BSD FRML MDRD: 70 ML/MIN/1.73
GLOBULIN UR ELPH-MCNC: 2.5 GM/DL
GLUCOSE SERPL-MCNC: 87 MG/DL (ref 65–99)
HCT VFR BLD AUTO: 43.3 % (ref 34–46.6)
HGB BLD-MCNC: 13.8 G/DL (ref 12–15.9)
IMM GRANULOCYTES # BLD AUTO: 0.01 10*3/MM3 (ref 0–0.05)
IMM GRANULOCYTES NFR BLD AUTO: 0.2 % (ref 0–0.5)
LDH SERPL-CCNC: 177 U/L (ref 135–214)
LYMPHOCYTES # BLD AUTO: 1.05 10*3/MM3 (ref 0.7–3.1)
LYMPHOCYTES NFR BLD AUTO: 26.1 % (ref 19.6–45.3)
MCH RBC QN AUTO: 31.7 PG (ref 26.6–33)
MCHC RBC AUTO-ENTMCNC: 31.9 G/DL (ref 31.5–35.7)
MCV RBC AUTO: 99.5 FL (ref 79–97)
MONOCYTES # BLD AUTO: 0.5 10*3/MM3 (ref 0.1–0.9)
MONOCYTES NFR BLD AUTO: 12.4 % (ref 5–12)
NEUTROPHILS NFR BLD AUTO: 2.23 10*3/MM3 (ref 1.7–7)
NEUTROPHILS NFR BLD AUTO: 55.6 % (ref 42.7–76)
NRBC BLD AUTO-RTO: 0 /100 WBC (ref 0–0.2)
PLATELET # BLD AUTO: 159 10*3/MM3 (ref 140–450)
PMV BLD AUTO: 9.9 FL (ref 6–12)
POTASSIUM SERPL-SCNC: 4.6 MMOL/L (ref 3.5–5.2)
PROT SERPL-MCNC: 6.5 G/DL (ref 6–8.5)
RBC # BLD AUTO: 4.35 10*6/MM3 (ref 3.77–5.28)
SODIUM SERPL-SCNC: 142 MMOL/L (ref 136–145)
WBC # BLD AUTO: 4.02 10*3/MM3 (ref 3.4–10.8)

## 2021-01-11 PROCEDURE — 36415 COLL VENOUS BLD VENIPUNCTURE: CPT

## 2021-01-11 PROCEDURE — 80053 COMPREHEN METABOLIC PANEL: CPT

## 2021-01-11 PROCEDURE — 83615 LACTATE (LD) (LDH) ENZYME: CPT

## 2021-01-11 PROCEDURE — 74176 CT ABD & PELVIS W/O CONTRAST: CPT

## 2021-01-11 PROCEDURE — 71250 CT THORAX DX C-: CPT

## 2021-01-11 PROCEDURE — 85025 COMPLETE CBC W/AUTO DIFF WBC: CPT

## 2021-01-12 ENCOUNTER — TELEMEDICINE (OUTPATIENT)
Dept: ONCOLOGY | Facility: CLINIC | Age: 71
End: 2021-01-12

## 2021-01-12 VITALS
RESPIRATION RATE: 16 BRPM | HEART RATE: 86 BPM | DIASTOLIC BLOOD PRESSURE: 80 MMHG | BODY MASS INDEX: 30.18 KG/M2 | WEIGHT: 165 LBS | SYSTOLIC BLOOD PRESSURE: 128 MMHG

## 2021-01-12 DIAGNOSIS — Z85.72 HISTORY OF DIFFUSE LARGE B-CELL LYMPHOMA: Primary | ICD-10-CM

## 2021-01-12 PROCEDURE — 99213 OFFICE O/P EST LOW 20 MIN: CPT | Performed by: NURSE PRACTITIONER

## 2021-01-12 NOTE — PROGRESS NOTES
This was an audio and video enabled telemedicine encounter via [employer].      CHIEF COMPLAINT: History of diffuse large B-cell lymphoma    Problem List:  Oncology/Hematology History Overview Note   1. History of benign transvaginal hysterectomy 1/1/2016  2. History of reflux  3. History of transient global amnesia  4. IV dye allergy as well as allergy to barium enema  5. 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  6/8/2016 colonoscopy benign  1/2019 apparently had CT screening of the chest that showed a couple of liver abnormalities (do not have those reports).  Unable to do with contrast and IV dye allergy.  Asymptomatic.  9/5/2019 CT of the abdomen without contrast shows at least 2 hepatic lesions, 4 cm in the right lobe of the liver and 2 cm in the left lobe of the liver.  9/11/2019 PET scan shows too numerous to count increased hypermetabolism in the anterior mediastinal, subcarinal, right lower lobe, head of the pancreas, esophagus, adjacent paraesophageal lymph nodes, liver, splenic hilum, left and right adrenal glands.  Also uptake in the mesentery, right retroperitoneum, right iliac chain, periumbilical subcutaneous fat, vaginal stump,  "and numerous bony lesions.  The bony lesions include the spine, right shoulder, right rib, sacrum, and right hip.  The 1.3 cm periumbilical subcutaneous nodule appears most amenable to biopsy.  Extensive periportal and periaortic lymphadenopathy with evidence of peritoneal metastasis for which PET CT was recommended.  9/18/2019 CT-guided fine-needle aspirate of nodule within the anterior abdominal wall \"atypical\" but unable to further typify.  9/26/2019 Dr. Kline initial visit.  Reviewed PET with patient and worrisome but nondiagnostic fine-needle aspirate.  Going to get sufficient tissue not only for diagnosis but for molecular testing that could include Biotheranostics to distinguish tissue type and 15 slides for Strata testing.  We will get our navigator on board as well.  She is having some nighttime sweats without fever though she does not describe them as bed drenching but this is new for her in the last month.  The range of possibilities here are broad and include lymphoma as well as a multiplicity of carcinomas but I need more than \"atypical\" cells on a fine-needle aspirate to ultimately decide our course.  We did talk about the likelihood that what ever we are up against it is unlikely to be curative but we will wait out her risk benefit ratio once we know the pathology.  Though they may ultimately not be helpful, while we are getting tissue I will go ahead and send off a barrage of tumor markers that might help guide us to the primary.    -10/7/2019 medical oncology office visit: Preliminary path report shows T-cell rich B-cell non-Hodgkin's lymphoma.  While awaiting final pathology we will get her port and echocardiogram and plan on treating her with Rituxan CHOP with Neulasta device for her stage IV high-grade lymphoma.  She has a high risk IPI score.  We will get her LDH and uric acid today.  Risks including renal dysfunction, nausea, neuropathy, pancytopenia, cardiac dysfunction, infusion reactions, " etc. were discussed in detail in addition to additional side effects as standard per all these medicines.  She knows that if this is the T-cell rich B-cell lymphoma that we treat this like a diffuse large B-cell lymphoma and that there is a possibility but not a guarantee of cure and it is not the majority in this setting but that plan and the statistics may change if the final pathology changes.    -10/8/2019 hepatitis B panel: Negative hepatitis B core antibody, negative hepatitis B surface antigen, reactive hepatitis B surface antibody.  Will need to monitor for any elevation of liver function studies during therapy.    -10/15/2019: Communication from Dr. Damian from [place] is that the final decision is that this is T-cell rich/histiocyte rich diffuse large B cell lymphoma.  Plan to proceed with R-CHOP with cooling cap.     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  Ejection fraction 58%.  Has cooling cap Education.  There is a international shortage of vincristine.  She will get vincristine with R-CHOP course #1 today but I am hesitant to just drop vincristine from all of her subsequent regimens but we will not have any to give her.  I plan to switch her regimen to EPOCHR but have removed the vincristine from the plan.  This will make her cooling cap a moot point since this is an 4-day infusional chemotherapy.  I will admit her on November 11 through hospitalists and plan for Neulasta shot as an outpatient on November 16.    -11/22/2019 through 11/25/2019 inpatient for MRSA line infection.  Port removed.    -12/2/19 hematology oncology office visit: Will finish 4 weeks of IV antibiotics with Dr. Alvarenga on approximately 12/23/2019.  Will get PET prior to then.  Has received 1 course of R-CHOP and 1 course of EPCHR.  Vincristine is now  available and we will go back to R-CHOP for course #3 but we need to finish IV antibiotics to ensure clearance of infection.  We will see her back 12/23/2019 for R-CHOP with PET prior to return.  We will plan on using the PICC line that currently is being used for her IV antibiotics.  Has noted some bubbles in her urine that I am not sure the significance.  No josiane evidence of fistula.    -12/23/2019 hematology oncology office visit: Prior bone and adenopathy abnormalities on PET resolved on images I reviewed and reports thereof.  Vincristine now available.  This is course #3 of treatment (R-CHOP today) we will rescan after 2 more courses but plan on a total of 6 courses given the stage IV presentation with bone involvement.  We will give 1 extra week to help her clear the infection thoroughly.  She stopped antibiotics 2 days ago intravenously.  Rescan every couple of courses.    -2/3/2020 PET negative    -7/6/2020 PET negative     Diffuse large B-cell lymphoma of lymph nodes of multiple regions (CMS/HCC)    Initial Diagnosis    Diffuse large B-cell lymphoma of lymph nodes of multiple regions (CMS/HCC)     9/11/2019 Imaging    PET/CT IMPRESSION:  There are too numerous to count hypermetabolic lesions as  described above involving multiple soft tissues and bony structures.  There is no prior exam for comparison. Although many sites are amenable  to percutaneous biopsy or excision, there is a periumbilical  subcutaneous nodule measuring 1.1 x 1.3 cm which would be the least  risky option.     10/21/2019 -  Chemotherapy    Course #1 R-CHOP 10/21/2019  Course #2 etoposide substitution for vincristine x1 due to vincristine shortage 11/11/2019-11/15/2019  Course #3 resumed R-CHOP with vincristine now available 12/30/2019   Course #4 R-CHOP 1/20/2020 11/22/2019 Adverse Reaction    Admitted to [facility] with fever in port site infection.  Treated on empiric antibiotics.  Port-A-Cath removed 11/23/2019,  "PICC line was placed.     12/13/2019 Imaging    PET is positive at the old port site but otherwise prior lymphadenopathy and bony abnormalities resolved      2/3/2020 Imaging    2/3/2020 PET negative     3/23/2020 Imaging    PET/CT IMPRESSION:  Stable negative PET/CT scan. No evidence of active or  recurrent lymphoma.     7/6/2020 Imaging    -7/6/2020 CT chest abdomen pelvis without contrast showed no evidence of disease.  Platelets 132,000 otherwise unremarkable CBC or CMP.  LDH still slightly elevated at 220.     1/11/2021 Imaging    CT chest, abdomen and pelvis without contrast IMPRESSION:  Stable exam. No evidence of active or recurrent lymphoma.  There is no evidence of acute intraabdominal or pelvic abnormality. No  acute intrathoracic abnormality is seen. No progression of disease.  Normal .  Normal CBC and CMP.         HISTORY OF PRESENT ILLNESS:  The patient is a 70 y.o. female, here for follow up on management of history of diffuse large B-cell lymphoma.  Nell has been doing well since we saw her last with no new concerns.  Has periodic acid reflux, she feels it may be related to her aspirin therapy.  Denies any night sweats.  Appetite is normal, no nausea.  Weight is stable.  No change in her bowel or bladder habits.  Denies any new pain.    Past Medical History:   Diagnosis Date   • Abnormal Pap smear of vagina     \"Abnormal pap smear\"   • Anxiety    • Cervical dysplasia    • Cystocele    • GERD (gastroesophageal reflux disease)     INTERMITTENT- NOT MEDICATION   • Migraine headache    • Osteopenia    • Transient global amnesia     fall 2015     Past Surgical History:   Procedure Laterality Date   • BREAST CYST ASPIRATION     • BREAST SURGERY      biopsy x 3   • CERVICAL BIOPSY      PT UNAWARE OF CERVIX BIOPSY   • IMPACTED THIRD MOLAR REMOVAL      wisdom teeth extracted in which two partially impacted   • OTHER SURGICAL HISTORY  10/02/2019    surgical bx of abd wall tumor   • PERIPHERALLY " "INSERTED CENTRAL CATHETER INSERTION Left 11/2019   • TONSILLECTOMY     • VAGINAL HYSTERECTOMY      with anterior repair   • VAGINAL HYSTERECTOMY W/ ANTERIOR AND POSTERIOR VAGINAL REPAIR N/A 11/15/2016    Procedure: VAGINAL HYSTERECTOMY WITH ANTERIOR VAGINAL REPAIR;  Surgeon: Henry Pitt MD;  Location:  [facility] OR;  Service:    • VENOUS ACCESS DEVICE (PORT) REMOVAL N/A 11/23/2019    Procedure: REMOVAL VENOUS ACCESS DEVICE;  Surgeon: Primo Swenson MD;  Location:  [facility] OR;  Service: General       Allergies   Allergen Reactions   • Betadine [Povidone Iodine]    • Codeine    • Contrast Dye Angioedema     Unknown   • E-Mycin [Erythromycin]    • Epinephrine    • Iodine    • Lidocaine Other (See Comments)     PT states when PICC was being placed and Lidocaine was being used she had a tightness feeling around her neck.  PT thought her gown was getting tight around her neck and when she went to pull the gown down, it was not around her neck.    • Other      MSG, Rema, Preservatives in eye ointment   • Procaine Other (See Comments)     DENTAL ANASTHESIA CAUSED HEART RACING- YRS AGO\"   • Sudafed [Pseudoephedrine Hcl] Palpitations       Family History and Social History reviewed and changed as necessary    REVIEW OF SYSTEM:   Positive for acid reflux    PHYSICAL EXAM:  Pleasant, healthy-appearing female in no distress.  Conversation is appropriate.  She is able to maintain conversation with no respiratory difficulty.  Normal affect, smiles often.    Vitals:    01/12/21 1016   BP: 128/80   Pulse: 86   Resp: 16   Weight: 74.8 kg (165 lb)     Vitals:    01/12/21 1016   PainSc: 0-No pain          Vitals reviewed.  Labs reviewed along with CT results and images chest abdomen and pelvis from 1/11/2020.    Recent Results (from the past 168 hour(s))   Comprehensive Metabolic Panel    Collection Time: 01/11/21  9:27 AM    Specimen: Blood   Result Value Ref Range    Glucose 87 65 - 99 mg/dL    BUN 15 8 - 23 mg/dL    " Creatinine 0.81 0.57 - 1.00 mg/dL    Sodium 142 136 - 145 mmol/L    Potassium 4.6 3.5 - 5.2 mmol/L    Chloride 105 98 - 107 mmol/L    CO2 28.0 22.0 - 29.0 mmol/L    Calcium 9.5 8.6 - 10.5 mg/dL    Total Protein 6.5 6.0 - 8.5 g/dL    Albumin 4.00 3.50 - 5.20 g/dL    ALT (SGPT) 13 1 - 33 U/L    AST (SGOT) 16 1 - 32 U/L    Alkaline Phosphatase 87 39 - 117 U/L    Total Bilirubin 0.4 0.0 - 1.2 mg/dL    eGFR Non African Amer 70 >60 mL/min/1.73    Globulin 2.5 gm/dL    A/G Ratio 1.6 g/dL    BUN/Creatinine Ratio 18.5 7.0 - 25.0    Anion Gap 9.0 5.0 - 15.0 mmol/L   Lactate Dehydrogenase    Collection Time: 01/11/21  9:27 AM    Specimen: Blood   Result Value Ref Range     135 - 214 U/L   CBC Auto Differential    Collection Time: 01/11/21  9:27 AM    Specimen: Blood   Result Value Ref Range    WBC 4.02 3.40 - 10.80 10*3/mm3    RBC 4.35 3.77 - 5.28 10*6/mm3    Hemoglobin 13.8 12.0 - 15.9 g/dL    Hematocrit 43.3 34.0 - 46.6 %    MCV 99.5 (H) 79.0 - 97.0 fL    MCH 31.7 26.6 - 33.0 pg    MCHC 31.9 31.5 - 35.7 g/dL    RDW 11.9 (L) 12.3 - 15.4 %    RDW-SD 43.6 37.0 - 54.0 fl    MPV 9.9 6.0 - 12.0 fL    Platelets 159 140 - 450 10*3/mm3    Neutrophil % 55.6 42.7 - 76.0 %    Lymphocyte % 26.1 19.6 - 45.3 %    Monocyte % 12.4 (H) 5.0 - 12.0 %    Eosinophil % 4.5 0.3 - 6.2 %    Basophil % 1.2 0.0 - 1.5 %    Immature Grans % 0.2 0.0 - 0.5 %    Neutrophils, Absolute 2.23 1.70 - 7.00 10*3/mm3    Lymphocytes, Absolute 1.05 0.70 - 3.10 10*3/mm3    Monocytes, Absolute 0.50 0.10 - 0.90 10*3/mm3    Eosinophils, Absolute 0.18 0.00 - 0.40 10*3/mm3    Basophils, Absolute 0.05 0.00 - 0.20 10*3/mm3    Immature Grans, Absolute 0.01 0.00 - 0.05 10*3/mm3    nRBC 0.0 0.0 - 0.2 /100 WBC     Ct Abdomen Pelvis Without Contrast    Result Date: 1/11/2021  Stable exam. No evidence of active or recurrent lymphoma. There is no evidence of acute intraabdominal or pelvic abnormality. No acute intrathoracic abnormality is seen. No progression of disease.  D:   01/11/2021 E:  01/11/2021  This report was finalized on 1/11/2021 4:58 PM by Dr. sIaura Owens MD.      Ct Chest Without Contrast Diagnostic    Result Date: 1/11/2021  Stable exam. No evidence of active or recurrent lymphoma. There is no evidence of acute intraabdominal or pelvic abnormality. No acute intrathoracic abnormality is seen. No progression of disease.  D:  01/11/2021 E:  01/11/2021  This report was finalized on 1/11/2021 4:58 PM by Dr. Isaura Owens MD.        ASSESSMENT & PLAN:    1.  History of non-Hodgkin's lymphoma, T-cell rich/histiocyte rich diffuse large B cell lymphoma, status post treatment with R-CHOP, last treatment 1/20/2020: Don is doing well, no evidence of disease on current CT chest, abdomen and pelvis, LDH, CBC and CMP are normal all outlined above.  She has no new worrisome symptoms.  We will continue surveillance with plans on repeat CT chest, abdomen and pelvis without IV contrast every 6 months until January 2022 which would be 2 years from the end of treatment.    2.  Acid reflux: I recommend she try over-the-counter medications such as Tums.  She feels this is aggravated by her aspirin therapy.  I discussed with her to continue aspirin therapy and discussed with Dr. Fernandez if she had any concerns or questions about taking this as she needs to discussed the risk/benefit with him.    This is a level 3, limited MDM visit with 1 stable chronic illness, review of labs and CT scans and ordering of future CTs and lab work for follow-up.    Manju Schulz, APRN    01/12/2021

## 2021-07-09 NOTE — TELEPHONE ENCOUNTER
Discussed with COLLEEN Nunes and she states to inform patient that Plt of 107 is nothing to be overly concerned about but we will monitor her counts a little more frequently after her visit on Monday new orders will be placed for more frequent lab checks. Called patient and she verbalized understanding.

## 2021-07-09 NOTE — TELEPHONE ENCOUNTER
Pt concerned due to her platelets being at a level 107. Pt would like to be contacted back at her home number.

## 2021-07-12 PROBLEM — C83.38 DIFFUSE LARGE B-CELL LYMPHOMA OF LYMPH NODES OF MULTIPLE REGIONS (HCC): Status: RESOLVED | Noted: 2019-09-26 | Resolved: 2021-01-01

## 2021-07-12 NOTE — PROGRESS NOTES
CHIEF COMPLAINT: 1.  Easy bruising 2.  History of lymphoma    Problem List:  Oncology/Hematology History Overview Note   1. History of benign transvaginal hysterectomy 1/1/2016  2. History of reflux  3. History of transient global amnesia  4. IV dye allergy as well as allergy to barium enema  5. 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  6/8/2016 colonoscopy benign  1/2019 apparently had CT screening of the chest that showed a couple of liver abnormalities (do not have those reports).  Unable to do with contrast and IV dye allergy.  Asymptomatic.  9/5/2019 CT of the abdomen without contrast shows at least 2 hepatic lesions, 4 cm in the right lobe of the liver and 2 cm in the left lobe of the liver.  9/11/2019 PET scan shows too numerous to count increased hypermetabolism in the anterior mediastinal, subcarinal, right lower lobe, head of the pancreas, esophagus, adjacent paraesophageal lymph nodes, liver, splenic hilum, left and right adrenal glands.  Also uptake in the mesentery, right retroperitoneum, right iliac chain, periumbilical subcutaneous fat, vaginal stump, and numerous bony lesions.  The bony lesions include the spine, right  "shoulder, right rib, sacrum, and right hip.  The 1.3 cm periumbilical subcutaneous nodule appears most amenable to biopsy.  Extensive periportal and periaortic lymphadenopathy with evidence of peritoneal metastasis for which PET CT was recommended.  9/18/2019 CT-guided fine-needle aspirate of nodule within the anterior abdominal wall \"atypical\" but unable to further typify.  9/26/2019 Dr. Kline initial visit.  Reviewed PET with patient and worrisome but nondiagnostic fine-needle aspirate.  Going to get sufficient tissue not only for diagnosis but for molecular testing that could include Biotheranostics to distinguish tissue type and 15 slides for Strata testing.  We will get our navigator on board as well.  She is having some nighttime sweats without fever though she does not describe them as bed drenching but this is new for her in the last month.  The range of possibilities here are broad and include lymphoma as well as a multiplicity of carcinomas but I need more than \"atypical\" cells on a fine-needle aspirate to ultimately decide our course.  We did talk about the likelihood that what ever we are up against it is unlikely to be curative but we will wait out her risk benefit ratio once we know the pathology.  Though they may ultimately not be helpful, while we are getting tissue I will go ahead and send off a barrage of tumor markers that might help guide us to the primary.    -10/7/2019 medical oncology office visit: Preliminary path report shows T-cell rich B-cell non-Hodgkin's lymphoma.  While awaiting final pathology we will get her port and echocardiogram and plan on treating her with Rituxan CHOP with Neulasta device for her stage IV high-grade lymphoma.  She has a high risk IPI score.  We will get her LDH and uric acid today.  Risks including renal dysfunction, nausea, neuropathy, pancytopenia, cardiac dysfunction, infusion reactions, etc. were discussed in detail in addition to additional side effects as " standard per all these medicines.  She knows that if this is the T-cell rich B-cell lymphoma that we treat this like a diffuse large B-cell lymphoma and that there is a possibility but not a guarantee of cure and it is not the majority in this setting but that plan and the statistics may change if the final pathology changes.    -10/8/2019 hepatitis B panel: Negative hepatitis B core antibody, negative hepatitis B surface antigen, reactive hepatitis B surface antibody.  Will need to monitor for any elevation of liver function studies during therapy.    -10/15/2019: Communication from Dr. Damian from [facility] is that the final decision is that this is T-cell rich/histiocyte rich diffuse large B cell lymphoma.  Plan to proceed with R-CHOP with cooling cap.     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  Ejection fraction 58%.  Has cooling cap Education.  There is a international shortage of vincristine.  She will get vincristine with R-CHOP course #1 today but I am hesitant to just drop vincristine from all of her subsequent regimens but we will not have any to give her.  I plan to switch her regimen to EPOCHR but have removed the vincristine from the plan.  This will make her cooling cap a moot point since this is an 4-day infusional chemotherapy.  I will admit her on November 11 through hospitalists and plan for Neulasta shot as an outpatient on November 16.    -11/22/2019 through 11/25/2019 inpatient for MRSA line infection.  Port removed.    -12/2/19 hematology oncology office visit: Will finish 4 weeks of IV antibiotics with Dr. Alvarenga on approximately 12/23/2019.  Will get PET prior to then.  Has received 1 course of R-CHOP and 1 course of EPCHR.  Vincristine is now available and we will go back to R-CHOP for course #3 but we need to  finish IV antibiotics to ensure clearance of infection.  We will see her back 12/23/2019 for R-CHOP with PET prior to return.  We will plan on using the PICC line that currently is being used for her IV antibiotics.  Has noted some bubbles in her urine that I am not sure the significance.  No josiane evidence of fistula.    -12/23/2019 hematology oncology office visit: Prior bone and adenopathy abnormalities on PET resolved on images I reviewed and reports thereof.  Vincristine now available.  This is course #3 of treatment (R-CHOP today) we will rescan after 2 more courses but plan on a total of 6 courses given the stage IV presentation with bone involvement.  We will give 1 extra week to help her clear the infection thoroughly.  She stopped antibiotics 2 days ago intravenously.  Rescan every couple of courses.    -2/3/2020 PET negative    -7/6/2020 PET negative    -1/11/2021 CT chest, abdomen and pelvis negative    -7/6/2021 CT chest, abdomen and pelvis negative.  CBC with WBC 3.2, hemoglobin 12.1, platelet count 107,000, ANC 1.3.  CMP normal.  .  We will repeat CBC in 6 weeks and again prior to return in 6 months with repeat CT scans.  At this point she will be 2 years out from treatment and if still negative will discontinue routine surveillance imaging at that time.     Diffuse large B-cell lymphoma of lymph nodes of multiple regions (CMS/HCC) (Resolved)    Initial Diagnosis    Diffuse large B-cell lymphoma of lymph nodes of multiple regions (CMS/HCC)     9/11/2019 Imaging    PET/CT IMPRESSION:  There are too numerous to count hypermetabolic lesions as  described above involving multiple soft tissues and bony structures.  There is no prior exam for comparison. Although many sites are amenable  to percutaneous biopsy or excision, there is a periumbilical  subcutaneous nodule measuring 1.1 x 1.3 cm which would be the least  risky option.     10/21/2019 -  Chemotherapy    Course #1 R-CHOP 10/21/2019  Course  "#2 etoposide substitution for vincristine x1 due to vincristine shortage 11/11/2019-11/15/2019  Course #3 resumed R-CHOP with vincristine now available 12/30/2019   Course #4 R-CHOP 1/20/2020 11/22/2019 Adverse Reaction    Admitted to [facility] with fever in port site infection.  Treated on empiric antibiotics.  Port-A-Cath removed 11/23/2019, PICC line was placed.     12/13/2019 Imaging    PET is positive at the old port site but otherwise prior lymphadenopathy and bony abnormalities resolved      2/3/2020 Imaging    2/3/2020 PET negative     3/23/2020 Imaging    PET/CT IMPRESSION:  Stable negative PET/CT scan. No evidence of active or  recurrent lymphoma.     7/6/2020 Imaging    -7/6/2020 CT chest abdomen pelvis without contrast showed no evidence of disease.  Platelets 132,000 otherwise unremarkable CBC or CMP.  LDH still slightly elevated at 220.     1/11/2021 Imaging    CT chest, abdomen and pelvis without contrast IMPRESSION:  Stable exam. No evidence of active or recurrent lymphoma.  There is no evidence of acute intraabdominal or pelvic abnormality. No  acute intrathoracic abnormality is seen. No progression of disease.  Normal .  Normal CBC and CMP.     7/6/2021 Imaging    CT chest, abdomen and pelvis IMPRESSION:  Stable negative CT of the chest, abdomen and pelvis with no  evidence of lymphomatous disease recurrence. No acute findings are  present.         HISTORY OF PRESENT ILLNESS:  The patient is a 71 y.o. female, here for follow up on management of history of diffuse large B-cell lymphoma.  Nell states that she feels she has been bruising easier lately.  She stopped taking her aspirin when she saw the results of her labs on 7/7/2021 with platelet count of 107,000.  She has not had any bleeding.  Otherwise she has been feeling well.  Acid reflux under good control on Pepcid AC.    Past Medical History:   Diagnosis Date   • Abnormal Pap smear of vagina     \"Abnormal pap " "smear\"   • Anxiety    • Cervical dysplasia    • Cystocele    • GERD (gastroesophageal reflux disease)     INTERMITTENT- NOT MEDICATION   • Migraine headache    • Osteopenia    • Transient global amnesia     fall 2015     Past Surgical History:   Procedure Laterality Date   • BREAST CYST ASPIRATION     • BREAST SURGERY      biopsy x 3   • CERVICAL BIOPSY      PT UNAWARE OF CERVIX BIOPSY   • IMPACTED THIRD MOLAR REMOVAL      wisdom teeth extracted in which two partially impacted   • OTHER SURGICAL HISTORY  10/02/2019    surgical bx of abd wall tumor   • PERIPHERALLY INSERTED CENTRAL CATHETER INSERTION Left 11/2019   • TONSILLECTOMY     • VAGINAL HYSTERECTOMY      with anterior repair   • VAGINAL HYSTERECTOMY W/ ANTERIOR AND POSTERIOR VAGINAL REPAIR N/A 11/15/2016    Procedure: VAGINAL HYSTERECTOMY WITH ANTERIOR VAGINAL REPAIR;  Surgeon: Henry Pitt MD;  Location:  [facility] OR;  Service:    • VENOUS ACCESS DEVICE (PORT) REMOVAL N/A 11/23/2019    Procedure: REMOVAL VENOUS ACCESS DEVICE;  Surgeon: Primo Swenson MD;  Location:  [facility] OR;  Service: General       Allergies   Allergen Reactions   • Betadine [Povidone Iodine]    • Codeine    • Contrast Dye Angioedema     Unknown   • E-Mycin [Erythromycin]    • Epinephrine    • Iodine    • Lidocaine Other (See Comments)     PT states when PICC was being placed and Lidocaine was being used she had a tightness feeling around her neck.  PT thought her gown was getting tight around her neck and when she went to pull the gown down, it was not around her neck.    • Other      MSG, Rema, Preservatives in eye ointment   • Procaine Other (See Comments)     DENTAL ANASTHESIA CAUSED HEART RACING- YRS AGO\"   • Sudafed [Pseudoephedrine Hcl] Palpitations       Family History and Social History reviewed and changed as necessary    REVIEW OF SYSTEM:   Positive for easy bruising    PHYSICAL EXAM:  General: Well-developed, well-nourished female in no distress  Skin: Bruise on right " "upper thigh after trauma, also mild bruising on left upper arm patient relates to mild trauma  Nodes: No cervical, supraclavicular, axillary or inguinal nodes palpable on exam.    Vitals:    07/12/21 1259   BP: 143/68   Pulse: 87   Resp: 16   Temp: 98.4 °F (36.9 °C)   SpO2: 97%   Weight: 73.5 kg (162 lb)   Height: 157.5 cm (62\")     Vitals:    07/12/21 1259   PainSc: 0-No pain          ECOG score: 0           Vitals reviewed.  Labs reviewed.      Lab Results   Component Value Date    HGB 12.1 07/06/2021    HCT 35.9 07/06/2021    MCV 96.2 07/06/2021     (L) 07/06/2021    WBC 3.20 (L) 07/06/2021    NEUTROABS 1.31 (L) 07/06/2021    LYMPHSABS 0.82 07/06/2021    MONOSABS 1.02 (H) 07/06/2021    EOSABS 0.02 07/06/2021    BASOSABS 0.02 07/06/2021       Lab Results   Component Value Date    GLUCOSE 100 (H) 07/06/2021    BUN 10 07/06/2021    CREATININE 0.71 07/06/2021     07/06/2021    K 3.7 07/06/2021     07/06/2021    CO2 26.0 07/06/2021    CALCIUM 9.2 07/06/2021    PROTEINTOT 6.1 07/06/2021    ALBUMIN 4.30 07/06/2021    BILITOT 0.9 07/06/2021    ALKPHOS 75 07/06/2021    AST 20 07/06/2021    ALT 11 07/06/2021           ASSESSMENT & PLAN:    1.  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  Thrombocytopenia with easy bruising    Discussion:  CT chest, abdomen and pelvis negative.  CBC with WBC 3.2, hemoglobin 12.1, platelet count 107,000, ANC 1.3.  CMP normal.  .  We will repeat CBC in 6 weeks to follow-up on her thrombocytopenia and ensure platelet count is not dropping.  She is currently holding her aspirin and I have asked her to discuss with Dr. Fernandez whether or not he wants her to continue aspirin.  We will repeat labs again prior to return in 6 months with CT scans and I have ordered those today.  At that point she will be 2 years out from treatment and if still negative will " discontinue routine surveillance imaging at that time and just follow-up with routine labs and H&P.    I spent 30 minutes caring for Nell on this date of service. This time includes time spent by me in the following activities: preparing for the visit, reviewing tests, obtaining and/or reviewing a separately obtained history, performing a medically appropriate examination and/or evaluation, counseling and educating the patient/family/caregiver, ordering medications, tests, or procedures and documenting information in the medical record.     Manju Schulz, APRN    07/12/2021

## 2021-08-16 NOTE — TELEPHONE ENCOUNTER
Discussed with Dr. Kline he would like patient to have a BM biopsy, LDH, and uric acid checked to rule out a marrow recurrence. Called patient and she verbalized understanding.

## 2021-08-16 NOTE — TELEPHONE ENCOUNTER
Caller: Nell Montez    Relationship: Self    Best call back number: [phone]    What is the best time to reach you: ASAP    Who are you requesting to speak with (clinical staff, provider,  specific staff member): NURSE    Do you know the name of the person who called:     What was the call regarding: MOST RECENT LABS    Do you require a callback: YES

## 2021-08-18 NOTE — TELEPHONE ENCOUNTER
Provider:HYUN    Caller: KATHERIN    Relationship to Patient:SELF    Phone Number: [phone]    Reason for Call: PATIENT CALLED TO WHEN BONE BIOPSY WAS SCHEDULED. PLEASE SCHEDULE PATIENT FOR BONE BIOPSY AND CALL PATIENT BACK TO [facility]

## 2021-08-26 NOTE — PRE-PROCEDURE NOTE
An immediate patient assessment was done prior to the administration of moderate and/or deep conscious sedation.      Progress Note    Nell Montez      Pre-op Diagnosis:   Low platelets  Post-op diagnosis:  same        Anesthesia: * No anesthesia type entered *     ASA SCALE ASSESSMENT:  2-Mild to moderate systemic disease, medically well controlled, with no functional limitation    MALLAMPATI CLASSIFICATION:  2-Able to visualize the soft palate, fauces, uvula. The anterior & posterior tonsilar pillars are hidden by the tongue.    Staff:   William lAvarado Jr., MD       Estimated Blood Loss: less 5 cc    Urine Voided: * No values recorded between 8/26/2021 12:00 AM and 8/26/2021  9:34 AM *    Specimens:                yes      Drains: * No LDAs found *      Findings: full report to follow    Complications: none      William Alvarado Jr., MD    Vascular Interventional Radiology    Date: 08/26/21   Time: 9:34 AM EDT

## 2021-08-26 NOTE — NURSING NOTE
Image guided bone biopsy performed by William Alvarado MD . Samples obtained, labeled, and sent to lab. Patient tolerated well. Report called to nurse.

## 2021-09-02 PROBLEM — Z85.72 HISTORY OF NON-HODGKIN'S LYMPHOMA: Status: RESOLVED | Noted: 2019-09-26 | Resolved: 2021-01-01

## 2021-09-02 PROBLEM — C83.38 DIFFUSE LARGE B-CELL LYMPHOMA OF LYMPH NODES OF MULTIPLE REGIONS (HCC): Chronic | Status: RESOLVED | Noted: 2019-09-26 | Resolved: 2021-01-01

## 2021-09-02 PROBLEM — Z85.72 HISTORY OF NON-HODGKIN'S LYMPHOMA: Chronic | Status: ACTIVE | Noted: 2019-09-26

## 2021-09-02 PROBLEM — Z85.72 HISTORY OF NON-HODGKIN'S LYMPHOMA: Status: ACTIVE | Noted: 2019-09-26

## 2021-09-02 NOTE — PROGRESS NOTES
CHIEF COMPLAINT: Pancytopenia    Problem List:  Oncology/Hematology History Overview Note   1. History of benign transvaginal hysterectomy 1/1/2016  2. History of reflux  3. History of transient global amnesia  4. IV dye allergy as well as allergy to barium enema  5. 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  6/8/2016 colonoscopy benign  1/2019 apparently had CT screening of the chest that showed a couple of liver abnormalities (do not have those reports).  Unable to do with contrast and IV dye allergy.  Asymptomatic.  9/5/2019 CT of the abdomen without contrast shows at least 2 hepatic lesions, 4 cm in the right lobe of the liver and 2 cm in the left lobe of the liver.  9/11/2019 PET scan shows too numerous to count increased hypermetabolism in the anterior mediastinal, subcarinal, right lower lobe, head of the pancreas, esophagus, adjacent paraesophageal lymph nodes, liver, splenic hilum, left and right adrenal glands.  Also uptake in the mesentery, right retroperitoneum, right iliac chain, periumbilical subcutaneous fat, vaginal stump, and numerous bony lesions.  The bony lesions include the spine, right shoulder, right rib, sacrum, and  "right hip.  The 1.3 cm periumbilical subcutaneous nodule appears most amenable to biopsy.  Extensive periportal and periaortic lymphadenopathy with evidence of peritoneal metastasis for which PET CT was recommended.  9/18/2019 CT-guided fine-needle aspirate of nodule within the anterior abdominal wall \"atypical\" but unable to further typify.  9/26/2019 Dr. Kline initial visit.  Reviewed PET with patient and worrisome but nondiagnostic fine-needle aspirate.  Going to get sufficient tissue not only for diagnosis but for molecular testing that could include Biotheranostics to distinguish tissue type and 15 slides for Strata testing.  We will get our navigator on board as well.  She is having some nighttime sweats without fever though she does not describe them as bed drenching but this is new for her in the last month.  The range of possibilities here are broad and include lymphoma as well as a multiplicity of carcinomas but I need more than \"atypical\" cells on a fine-needle aspirate to ultimately decide our course.  We did talk about the likelihood that what ever we are up against it is unlikely to be curative but we will wait out her risk benefit ratio once we know the pathology.  Though they may ultimately not be helpful, while we are getting tissue I will go ahead and send off a barrage of tumor markers that might help guide us to the primary.    -10/7/2019 medical oncology office visit: Preliminary path report shows T-cell rich B-cell non-Hodgkin's lymphoma.  While awaiting final pathology we will get her port and echocardiogram and plan on treating her with Rituxan CHOP with Neulasta device for her stage IV high-grade lymphoma.  She has a high risk IPI score.  We will get her LDH and uric acid today.  Risks including renal dysfunction, nausea, neuropathy, pancytopenia, cardiac dysfunction, infusion reactions, etc. were discussed in detail in addition to additional side effects as standard per all these medicines. "  She knows that if this is the T-cell rich B-cell lymphoma that we treat this like a diffuse large B-cell lymphoma and that there is a possibility but not a guarantee of cure and it is not the majority in this setting but that plan and the statistics may change if the final pathology changes.    -10/8/2019 hepatitis B panel: Negative hepatitis B core antibody, negative hepatitis B surface antigen, reactive hepatitis B surface antibody.  Will need to monitor for any elevation of liver function studies during therapy.    -10/15/2019: Communication from Dr. Damian from [place] is that the final decision is that this is T-cell rich/histiocyte rich diffuse large B cell lymphoma.  Plan to proceed with R-CHOP with cooling cap.     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  Ejection fraction 58%.  Has cooling cap Education.  There is a international shortage of vincristine.  She will get vincristine with R-CHOP course #1 today but I am hesitant to just drop vincristine from all of her subsequent regimens but we will not have any to give her.  I plan to switch her regimen to EPOCHR but have removed the vincristine from the plan.  This will make her cooling cap a moot point since this is an 4-day infusional chemotherapy.  I will admit her on November 11 through hospitalists and plan for Neulasta shot as an outpatient on November 16.    -11/22/2019 through 11/25/2019 inpatient for MRSA line infection.  Port removed.    -12/2/19 hematology oncology office visit: Will finish 4 weeks of IV antibiotics with Dr. Alvarenga on approximately 12/23/2019.  Will get PET prior to then.  Has received 1 course of R-CHOP and 1 course of EPCHR.  Vincristine is now available and we will go back to R-CHOP for course #3 but we need to finish IV antibiotics to ensure  clearance of infection.  We will see her back 12/23/2019 for R-CHOP with PET prior to return.  We will plan on using the PICC line that currently is being used for her IV antibiotics.  Has noted some bubbles in her urine that I am not sure the significance.  No josiane evidence of fistula.    -12/23/2019 hematology oncology office visit: Prior bone and adenopathy abnormalities on PET resolved on images I reviewed and reports thereof.  Vincristine now available.  This is course #3 of treatment (R-CHOP today) we will rescan after 2 more courses but plan on a total of 6 courses given the stage IV presentation with bone involvement.  We will give 1 extra week to help her clear the infection thoroughly.  She stopped antibiotics 2 days ago intravenously.  Rescan every couple of courses.    -2/3/2020 PET negative    -7/6/2020 PET negative    -1/11/2021 CT chest, abdomen and pelvis negative    -7/6/2021 CT chest, abdomen and pelvis negative.  CBC with WBC 3.2, hemoglobin 12.1, platelet count 107,000, ANC 1.3.  CMP normal.  .  We will repeat CBC in 6 weeks and again prior to return in 6 months with repeat CT scans.  At that point she will be 2 years out from treatment and if still negative will discontinue routine surveillance imaging at that time.    -8/26/2021 data: White count 2400, hemoglobin 12.2, platelets 73,000.  Hypercellular marrow with left shifted myeloid maturation and increased monocytes with left shift.  No definitive atypia.  Evolving myeloid neoplasm cannot entirely be excluded.  Cytogenetics pending.  No flow cytometric evidence of lymphoproliferative disorder.    -9/2/2021 [facility] medical oncology follow-up visit: With hypercellularity of marrow and no flow cytometric or light microscopic evidence of lymphoma nor CT evidence of lymphoma, the specter of lurking secondary leukemia still exists.  Cytogenetics are pending.  I have nothing else to add at this point other than to watch her counts for the  moment and if they continue to decline, will refer to [facility] leukemia service for further evaluation.  No josiane leukemia per our pathologist review at this junction.  I will see her back in a month with CBC just prior to return with peripheral smear.     History of non-Hodgkin's lymphoma    Initial Diagnosis    Diffuse large B-cell lymphoma of lymph nodes of multiple regions (CMS/HCC)     9/11/2019 Imaging    PET/CT IMPRESSION:  There are too numerous to count hypermetabolic lesions as  described above involving multiple soft tissues and bony structures.  There is no prior exam for comparison. Although many sites are amenable  to percutaneous biopsy or excision, there is a periumbilical  subcutaneous nodule measuring 1.1 x 1.3 cm which would be the least  risky option.     10/21/2019 -  Chemotherapy    Course #1 R-CHOP 10/21/2019  Course #2 etoposide substitution for vincristine x1 due to vincristine shortage 11/11/2019-11/15/2019  Course #3 resumed R-CHOP with vincristine now available 12/30/2019   Course #4 R-CHOP 1/20/2020 11/22/2019 Adverse Reaction    Admitted to [facility] with fever in port site infection.  Treated on empiric antibiotics.  Port-A-Cath removed 11/23/2019, PICC line was placed.     12/13/2019 Imaging    PET is positive at the old port site but otherwise prior lymphadenopathy and bony abnormalities resolved      2/3/2020 Imaging    2/3/2020 PET negative     3/23/2020 Imaging    PET/CT IMPRESSION:  Stable negative PET/CT scan. No evidence of active or  recurrent lymphoma.     7/6/2020 Imaging    -7/6/2020 CT chest abdomen pelvis without contrast showed no evidence of disease.  Platelets 132,000 otherwise unremarkable CBC or CMP.  LDH still slightly elevated at 220.     1/11/2021 Imaging    CT chest, abdomen and pelvis without contrast IMPRESSION:  Stable exam. No evidence of active or recurrent lymphoma.  There is no evidence of acute intraabdominal or pelvic  "abnormality. No  acute intrathoracic abnormality is seen. No progression of disease.  Normal .  Normal CBC and CMP.     7/6/2021 Imaging    CT chest, abdomen and pelvis IMPRESSION:  Stable negative CT of the chest, abdomen and pelvis with no  evidence of lymphomatous disease recurrence. No acute findings are  present.         HISTORY OF PRESENT ILLNESS:  The patient is a 71 y.o. female, here for follow up on management of pancytopenia with history of lymphoma    Past Medical History:   Diagnosis Date   • Abnormal Pap smear of vagina     \"Abnormal pap smear\"   • Anxiety    • Cancer (CMS/HCC)    • Cervical dysplasia    • Cystocele    • GERD (gastroesophageal reflux disease)     INTERMITTENT- NOT MEDICATION   • Migraine headache    • Non Hodgkin's lymphoma (CMS/HCC) 2020   • Osteopenia    • Transient global amnesia     fall 2015     Past Surgical History:   Procedure Laterality Date   • BREAST CYST ASPIRATION     • BREAST SURGERY      biopsy x 3   • CERVICAL BIOPSY      PT UNAWARE OF CERVIX BIOPSY   • IMPACTED THIRD MOLAR REMOVAL      wisdom teeth extracted in which two partially impacted   • OTHER SURGICAL HISTORY  10/02/2019    surgical bx of abd wall tumor   • PERIPHERALLY INSERTED CENTRAL CATHETER INSERTION Left 11/2019   • TONSILLECTOMY     • VAGINAL HYSTERECTOMY      with anterior repair   • VAGINAL HYSTERECTOMY W/ ANTERIOR AND POSTERIOR VAGINAL REPAIR N/A 11/15/2016    Procedure: VAGINAL HYSTERECTOMY WITH ANTERIOR VAGINAL REPAIR;  Surgeon: Henry Pitt MD;  Location:  [facility] OR;  Service:    • VENOUS ACCESS DEVICE (PORT) REMOVAL N/A 11/23/2019    Procedure: REMOVAL VENOUS ACCESS DEVICE;  Surgeon: Primo Swenson MD;  Location:  [facility] OR;  Service: General       Allergies   Allergen Reactions   • Betadine [Povidone Iodine]    • Codeine    • Contrast Dye Angioedema     Unknown   • E-Mycin [Erythromycin]    • Epinephrine    • Iodine    • Lidocaine Other (See Comments)     PT states when PICC was " "being placed and Lidocaine was being used she had a tightness feeling around her neck.  PT thought her gown was getting tight around her neck and when she went to pull the gown down, it was not around her neck.    • Other      MSG, Rema, Preservatives in eye ointment   • Procaine Other (See Comments)     DENTAL ANASTHESIA CAUSED HEART RACING- YRS AGO\"   • Sudafed [Pseudoephedrine Hcl] Palpitations       Family History and Social History reviewed and changed as necessary    REVIEW OF SYSTEM:   No new somatic complaints    PHYSICAL EXAM:  No jaundice or icterus.  No hepatosplenomegaly    Vitals:    09/02/21 1256   BP: 132/65   Pulse: 97   Resp: 16   Temp: 96.6 °F (35.9 °C)   SpO2: 98%   Weight: 71.2 kg (157 lb)   Height: 157.5 cm (62\")     Vitals:    09/02/21 1256   PainSc: 0-No pain          ECOG score: 0           Vitals reviewed.    ECOG: (0) Fully Active - Able to Carry On All Pre-disease Performance Without Restriction    Lab Results   Component Value Date    HGB 11.9 (L) 09/02/2021    HCT 36.7 09/02/2021    .6 (H) 09/02/2021    PLT 90 (L) 09/02/2021    WBC 2.30 (L) 09/02/2021    NEUTROABS 0.60 (L) 09/02/2021    LYMPHSABS 1.10 09/02/2021    MONOSABS 0.60 09/02/2021    EOSABS 0.02 08/26/2021    BASOSABS 0.00 08/26/2021       Lab Results   Component Value Date    GLUCOSE 94 08/11/2021    BUN 12 08/11/2021    CREATININE 0.86 08/11/2021     08/11/2021    K 3.9 08/11/2021     08/11/2021    CO2 25.0 08/11/2021    CALCIUM 9.3 08/11/2021    PROTEINTOT 6.2 08/11/2021    ALBUMIN 4.20 08/11/2021    BILITOT 1.1 08/11/2021    ALKPHOS 59 08/11/2021    AST 19 08/11/2021    ALT 10 08/11/2021             ASSESSMENT & PLAN:  6. History of benign transvaginal hysterectomy 1/1/2016  7. History of reflux  8. History of transient global amnesia  9. IV dye allergy as well as allergy to barium enema  10. 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  6/8/2016 colonoscopy benign  1/2019 apparently had CT screening of the chest that showed a couple of liver abnormalities (do not have those reports).  Unable to do with contrast and IV dye allergy.  Asymptomatic.  9/5/2019 CT of the abdomen without contrast shows at least 2 hepatic lesions, 4 cm in the right lobe of the liver and 2 cm in the left lobe of the liver.  9/11/2019 PET scan shows too numerous to count increased hypermetabolism in the anterior mediastinal, subcarinal, right lower lobe, head of the pancreas, esophagus, adjacent paraesophageal lymph nodes, liver, splenic hilum, left and right adrenal glands.  Also uptake in the mesentery, right retroperitoneum, right iliac chain, periumbilical subcutaneous fat, vaginal stump, and numerous bony lesions.  The bony lesions include the spine, right shoulder, right rib, sacrum, and right hip.  The 1.3 cm periumbilical subcutaneous nodule appears most amenable to biopsy.  Extensive periportal and periaortic lymphadenopathy with evidence of peritoneal metastasis for which PET CT was recommended.  9/18/2019 CT-guided fine-needle aspirate of nodule within the anterior abdominal wall \"atypical\" but unable to further typify.  9/26/2019 Dr. Klnie initial visit.  " "Reviewed PET with patient and worrisome but nondiagnostic fine-needle aspirate.  Going to get sufficient tissue not only for diagnosis but for molecular testing that could include Biotheranostics to distinguish tissue type and 15 slides for Strata testing.  We will get our navigator on board as well.  She is having some nighttime sweats without fever though she does not describe them as bed drenching but this is new for her in the last month.  The range of possibilities here are broad and include lymphoma as well as a multiplicity of carcinomas but I need more than \"atypical\" cells on a fine-needle aspirate to ultimately decide our course.  We did talk about the likelihood that what ever we are up against it is unlikely to be curative but we will wait out her risk benefit ratio once we know the pathology.  Though they may ultimately not be helpful, while we are getting tissue I will go ahead and send off a barrage of tumor markers that might help guide us to the primary.    -10/7/2019 medical oncology office visit: Preliminary path report shows T-cell rich B-cell non-Hodgkin's lymphoma.  While awaiting final pathology we will get her port and echocardiogram and plan on treating her with Rituxan CHOP with Neulasta device for her stage IV high-grade lymphoma.  She has a high risk IPI score.  We will get her LDH and uric acid today.  Risks including renal dysfunction, nausea, neuropathy, pancytopenia, cardiac dysfunction, infusion reactions, etc. were discussed in detail in addition to additional side effects as standard per all these medicines.  She knows that if this is the T-cell rich B-cell lymphoma that we treat this like a diffuse large B-cell lymphoma and that there is a possibility but not a guarantee of cure and it is not the majority in this setting but that plan and the statistics may change if the final pathology changes.    -10/8/2019 hepatitis B panel: Negative hepatitis B core antibody, negative " hepatitis B surface antigen, reactive hepatitis B surface antibody.  Will need to monitor for any elevation of liver function studies during therapy.    -10/15/2019: Communication from Dr. Damian from [place] is that the final decision is that this is T-cell rich/histiocyte rich diffuse large B cell lymphoma.  Plan to proceed with R-CHOP with cooling cap.     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  Ejection fraction 58%.  Has cooling cap Education.  There is a international shortage of vincristine.  She will get vincristine with R-CHOP course #1 today but I am hesitant to just drop vincristine from all of her subsequent regimens but we will not have any to give her.  I plan to switch her regimen to EPOCHR but have removed the vincristine from the plan.  This will make her cooling cap a moot point since this is an 4-day infusional chemotherapy.  I will admit her on November 11 through hospitalists and plan for Neulasta shot as an outpatient on November 16.    -11/22/2019 through 11/25/2019 inpatient for MRSA line infection.  Port removed.    -12/2/19 hematology oncology office visit: Will finish 4 weeks of IV antibiotics with Dr. Alvarenga on approximately 12/23/2019.  Will get PET prior to then.  Has received 1 course of R-CHOP and 1 course of EPCHR.  Vincristine is now available and we will go back to R-CHOP for course #3 but we need to finish IV antibiotics to ensure clearance of infection.  We will see her back 12/23/2019 for R-CHOP with PET prior to return.  We will plan on using the PICC line that currently is being used for her IV antibiotics.  Has noted some bubbles in her urine that I am not sure the significance.  No josiane evidence of fistula.    -12/23/2019 hematology oncology office visit: Prior bone and adenopathy abnormalities  on PET resolved on images I reviewed and reports thereof.  Vincristine now available.  This is course #3 of treatment (R-CHOP today) we will rescan after 2 more courses but plan on a total of 6 courses given the stage IV presentation with bone involvement.  We will give 1 extra week to help her clear the infection thoroughly.  She stopped antibiotics 2 days ago intravenously.  Rescan every couple of courses.    -2/3/2020 PET negative    -7/6/2020 PET negative    -1/11/2021 CT chest, abdomen and pelvis negative    -7/6/2021 CT chest, abdomen and pelvis negative.  CBC with WBC 3.2, hemoglobin 12.1, platelet count 107,000, ANC 1.3.  CMP normal.  .  We will repeat CBC in 6 weeks and again prior to return in 6 months with repeat CT scans.  At that point she will be 2 years out from treatment and if still negative will discontinue routine surveillance imaging at that time.    -8/26/2021 data: White count 2400, hemoglobin 12.2, platelets 73,000.  Hypercellular marrow with left shifted myeloid maturation and increased monocytes with left shift.  No definitive atypia.  Evolving myeloid neoplasm cannot entirely be excluded.  Cytogenetics pending.  No flow cytometric evidence of lymphoproliferative disorder.    -9/2/2021 [facility] oncology follow-up visit: With hypercellularity of marrow and no flow cytometric or light microscopic evidence of lymphoma nor CT evidence of lymphoma, the specter of lurking secondary leukemia still exists.  Cytogenetics are pending.  I have nothing else to add at this point other than to watch her counts for the moment and if they continue to decline, will refer to [facility] leukemia service for further evaluation.  No josiane leukemia per our pathologist review at this junction.  I will see her back in a month with CBC just prior to return with peripheral smear. EGD and Colonoscopy with    Total time of care today inclusive of time spent today reviewing interval records and  labs and bone marrow report as above prior to her visit and during visit translating this to her in the complex decision tree and after visit arranging outpatient follow-up as outlined took 30 minutes of patient care time throughout the day today.  Ranulfo Kline MD    09/02/2021

## 2021-09-14 NOTE — TELEPHONE ENCOUNTER
Caller: Nell Montez    Relationship: Self    Best call back number: [phone]    What is the best time to reach you:  ANYTIME     Who are you requesting to speak with (clinical staff, provider,  specific staff member): DR PURVIS    Do you know the name of the person who called: NELL     What was the call regarding:     DR PURVIS HAD REF OVER TO [facility], AND HAD APPT [facility] AND THEN WAS CALLED TODAY AND TOLD APPT CANCELLED AND PUSHED UNTIL SEPT 24TH, ,     OFFICE OF DR BECKMAN, SPOKE TO SOMEONE TODAY AND THEY SAID DONT HAVE THE SLIDES FROM FACILITTY AND PTS IS CONFUSED DOES NOT UNDERSTAND AND WOULD LIKE TO TALK TO DR PURVIS AND FOLLOW UP WITH THIS TO SEE WHAT'S GOING ON. KNOWS DR PURVIS HAD WANTED PT TO BE SEEN URGENTLY.     Do you require a callback: YES

## 2021-09-15 NOTE — TELEPHONE ENCOUNTER
Caller: Nell Montez    Relationship to patient: Self    Best call back number: [phone]    Patient is needing: TO LET US KNOW THE SHCEDULING WAS RE-WORKED FOR [facility] AND SHE IS GOING TO HAVE HER APPT TODAY.

## 2021-10-01 NOTE — TELEPHONE ENCOUNTER
Returned patient call.  She advised that she is going to get her labs done about a week prior to the appt.  I advised her that we can switch it to video visit if she wishes.  She is going to call back a few days ahead of next appt and let us know as she may just keep it in person.

## 2021-10-01 NOTE — TELEPHONE ENCOUNTER
Caller: Paresh Montezna PEPITO    Relationship: Self    Best call back number: [phone]    What is the best time to reach you: ANY    Who are you requesting to speak with (clinical staff, provider,  specific staff member): DR. MACEDO'S NURSE    Do you know the name of the person who called: NELL    What was the call regarding:NELL SAW  AT  ON 10/19/2021 & HE DID NOT HAVE ANY MORE TO SAY THAN DR. PURVIS, HE DID MENTION MAYBE DOING ANOTHER BONE MARROW.  HER NEUTROPHILS WERE  SO HE PUT HER ON A ANTIFUNGAL, ANTIVIRAL & ANTIBIOTIC.  SHE IS JUST WANTING DR. PURVIS TO KNOW THIS.  SHE ALSO WOULD LIKE TO KNOW IF DR. PURVIS THINKS IT IS SAFE FOR HER TO GO OUT TO GET LABS FOR HER 10/14/2021 APPT. & IF SHE SHOULD DO A VIDEO APPT. W/ HIM OR DOES HE THINKS IT IS SAFE FOR HER TO COME IN    Do you require a callback: YES, PLEASE

## 2021-10-14 NOTE — PROGRESS NOTES
CHIEF COMPLAINT: Leukemic transformation    Problem List:  Oncology/Hematology History Overview Note   1. 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 History of reflux  3. History of transient global amnesia  4. IV dye allergy as well as allergy to barium enema  5. 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  6/8/2016 colonoscopy benign  1/2019 apparently had CT screening of the chest that showed a couple of liver abnormalities (do not have those reports).  Unable to do with contrast and IV dye allergy.  Asymptomatic.  9/5/2019 CT of the abdomen without contrast shows at least 2 hepatic lesions, 4 cm in the right lobe of the liver and 2 cm in the left lobe of the liver.  9/11/2019 PET scan shows too numerous to count increased hypermetabolism in the anterior mediastinal, subcarinal, right lower lobe, head of the pancreas, esophagus, adjacent paraesophageal lymph nodes, liver, splenic hilum, left and right adrenal glands.  Also uptake in the mesentery, right retroperitoneum, right iliac chain, periumbilical subcutaneous fat, vaginal stump, and numerous bony lesions.  The bony lesions include the  "spine, right shoulder, right rib, sacrum, and right hip.  The 1.3 cm periumbilical subcutaneous nodule appears most amenable to biopsy.  Extensive periportal and periaortic lymphadenopathy with evidence of peritoneal metastasis for which PET CT was recommended.  9/18/2019 CT-guided fine-needle aspirate of nodule within the anterior abdominal wall \"atypical\" but unable to further typify.  9/26/2019 Dr. Kline initial visit.  Reviewed PET with patient and worrisome but nondiagnostic fine-needle aspirate.  Going to get sufficient tissue not only for diagnosis but for molecular testing that could include Biotheranostics to distinguish tissue type and 15 slides for Strata testing.  We will get our navigator on board as well.  She is having some nighttime sweats without fever though she does not describe them as bed drenching but this is new for her in the last month.  The range of possibilities here are broad and include lymphoma as well as a multiplicity of carcinomas but I need more than \"atypical\" cells on a fine-needle aspirate to ultimately decide our course.  We did talk about the likelihood that what ever we are up against it is unlikely to be curative but we will wait out her risk benefit ratio once we know the pathology.  Though they may ultimately not be helpful, while we are getting tissue I will go ahead and send off a barrage of tumor markers that might help guide us to the primary.    -10/7/2019 medical oncology office visit: Preliminary path report shows T-cell rich B-cell non-Hodgkin's lymphoma.  While awaiting final pathology we will get her port and echocardiogram and plan on treating her with Rituxan CHOP with Neulasta device for her stage IV high-grade lymphoma.  She has a high risk IPI score.  We will get her LDH and uric acid today.  Risks including renal dysfunction, nausea, neuropathy, pancytopenia, cardiac dysfunction, infusion reactions, etc. were discussed in detail in addition to additional " side effects as standard per all these medicines.  She knows that if this is the T-cell rich B-cell lymphoma that we treat this like a diffuse large B-cell lymphoma and that there is a possibility but not a guarantee of cure and it is not the majority in this setting but that plan and the statistics may change if the final pathology changes.    -10/8/2019 hepatitis B panel: Negative hepatitis B core antibody, negative hepatitis B surface antigen, reactive hepatitis B surface antibody.  Will need to monitor for any elevation of liver function studies during therapy.    -10/15/2019: Communication from Dr. Damian from [place] is that the final decision is that this is T-cell rich/histiocyte rich diffuse large B cell lymphoma.  Plan to proceed with R-CHOP with cooling cap.     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  Ejection fraction 58%.  Has cooling cap [employer].  There is a international shortage of vincristine.  She will get vincristine with R-CHOP course #1 today but I am hesitant to just drop vincristine from all of her subsequent regimens but we will not have any to give her.  I plan to switch her regimen to EPOCHR but have removed the vincristine from the plan.  This will make her cooling cap a moot point since this is an 4-day infusional chemotherapy.  I will admit her on November 11 through hospitalists and plan for Neulasta shot as an outpatient on November 16.    -11/22/2019 through 11/25/2019 inpatient for MRSA line infection.  Port removed.    -12/2/19 hematology oncology office visit: Will finish 4 weeks of IV antibiotics with Dr. Alvarenga on approximately 12/23/2019.  Will get PET prior to then.  Has received 1 course of R-CHOP and 1 course of EPCHR.  Vincristine is now available and we will go back to R-CHOP for course #3 but  we need to finish IV antibiotics to ensure clearance of infection.  We will see her back 12/23/2019 for R-CHOP with PET prior to return.  We will plan on using the PICC line that currently is being used for her IV antibiotics.  Has noted some bubbles in her urine that I am not sure the significance.  No josiane evidence of fistula.    -12/23/2019 hematology oncology office visit: Prior bone and adenopathy abnormalities on PET resolved on images I reviewed and reports thereof.  Vincristine now available.  This is course #3 of treatment (R-CHOP today) we will rescan after 2 more courses but plan on a total of 6 courses given the stage IV presentation with bone involvement.  We will give 1 extra week to help her clear the infection thoroughly.  She stopped antibiotics 2 days ago intravenously.  Rescan every couple of courses.    -2/3/2020 PET negative    -7/6/2020 PET negative    -1/11/2021 CT chest, abdomen and pelvis negative    -7/6/2021 CT chest, abdomen and pelvis negative.  CBC with WBC 3.2, hemoglobin 12.1, platelet count 107,000, ANC 1.3.  CMP normal.  .  We will repeat CBC in 6 weeks and again prior to return in 6 months with repeat CT scans.  At that point she will be 2 years out from treatment and if still negative will discontinue routine surveillance imaging at that time.    -8/26/2021 data: White count 2400, hemoglobin 12.2, platelets 73,000.  Hypercellular marrow with left shifted myeloid maturation and increased monocytes with left shift.  No definitive atypia.  Evolving myeloid neoplasm cannot entirely be excluded.  Cytogenetics pending.  No flow cytometric evidence of lymphoproliferative disorder.    -9/2/2021 [facility] medical oncology follow-up visit: With hypercellularity of marrow and no flow cytometric or light microscopic evidence of lymphoma nor CT evidence of lymphoma, the specter of lurking secondary leukemia still exists.  Cytogenetics are pending.  I have nothing else to add at this  point other than to watch her counts for the moment and if they continue to decline, will refer to [facility] leukemia service for further evaluation.  No josiane leukemia per our pathologist review at this junction.  I will see her back in a month with CBC just prior to return with peripheral smear.    -9/7/2021 bone marrow cytogenetics called to me from Dr. Jonah Damian.  Translocation (9: 11) consistent with MLL gene associated with monocytic leukemia or treatment related myeloid leukemia.  Dr. Damian and Braulio confirmed that there is at most 7-9% monocytic precursors but not frankly leukemic.  Nonetheless, this is concerning for emerging leukemia and I am contacting Dr. Montanez and company at  to get her in for further management.  I called patient this day and informed her of this these results thereof.  This is not occurrence of her lymphoma but likely secondary leukemia emerging.    -9/15/2021  hematology consult Dr.Reshma Umana.  While technically not acute leukemic, presumed to have likely myelodysplasia and would watch serially but placed on prophylactic Zovirax, Levaquin, and Diflucan.  Follow-up scheduled 10/19/2021    -10/14/2021 [facility] hematology follow-up visit: Cytogenetics returned with translocation 9; 11 suggesting mL L rearrangement in 90% of the metaphases indicative of therapy-related myeloid neoplasm.  Confirmed by second opinion at [facility].  Appointment with Dr. Umana 10/19/2021 for management at [facility].  I reviewed 10/5/2021 CBC.  White count 2590 stable with hemoglobin 12.6 stable and .5.  Platelets 75,000 where it has been since mid August.  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  At this junction I will see on an as-needed basis and  leukemia service will manage     History of non-Hodgkin's lymphoma    Initial Diagnosis    Diffuse  large B-cell lymphoma of lymph nodes of multiple regions (CMS/HCC)     9/11/2019 Imaging    PET/CT IMPRESSION:  There are too numerous to count hypermetabolic lesions as  described above involving multiple soft tissues and bony structures.  There is no prior exam for comparison. Although many sites are amenable  to percutaneous biopsy or excision, there is a periumbilical  subcutaneous nodule measuring 1.1 x 1.3 cm which would be the least  risky option.     10/21/2019 -  Chemotherapy    Course #1 R-CHOP 10/21/2019  Course #2 etoposide substitution for vincristine x1 due to vincristine shortage 11/11/2019-11/15/2019  Course #3 resumed R-CHOP with vincristine now available 12/30/2019   Course #4 R-CHOP 1/20/2020 11/22/2019 Adverse Reaction    Admitted to [facility] with fever in port site infection.  Treated on empiric antibiotics.  Port-A-Cath removed 11/23/2019, PICC line was placed.     12/13/2019 Imaging    PET is positive at the old port site but otherwise prior lymphadenopathy and bony abnormalities resolved      2/3/2020 Imaging    2/3/2020 PET negative     3/23/2020 Imaging    PET/CT IMPRESSION:  Stable negative PET/CT scan. No evidence of active or  recurrent lymphoma.     7/6/2020 Imaging    -7/6/2020 CT chest abdomen pelvis without contrast showed no evidence of disease.  Platelets 132,000 otherwise unremarkable CBC or CMP.  LDH still slightly elevated at 220.     1/11/2021 Imaging    CT chest, abdomen and pelvis without contrast IMPRESSION:  Stable exam. No evidence of active or recurrent lymphoma.  There is no evidence of acute intraabdominal or pelvic abnormality. No  acute intrathoracic abnormality is seen. No progression of disease.  Normal .  Normal CBC and CMP.     7/6/2021 Imaging    CT chest, abdomen and pelvis IMPRESSION:  Stable negative CT of the chest, abdomen and pelvis with no  evidence of lymphomatous disease recurrence. No acute findings are  present.    "      HISTORY OF PRESENT ILLNESS:  The patient is a 71 y.o. female, here for follow up on management of pancytopenia due to leukemic transformation    Past Medical History:   Diagnosis Date   • Abnormal Pap smear of vagina     \"Abnormal pap smear\"   • Anxiety    • Cancer (HCC)    • Cervical dysplasia    • Cystocele    • GERD (gastroesophageal reflux disease)     INTERMITTENT- NOT MEDICATION   • Migraine headache    • Non Hodgkin's lymphoma (HCC) 2020   • Osteopenia    • Transient global amnesia     fall 2015     Past Surgical History:   Procedure Laterality Date   • BREAST CYST ASPIRATION     • BREAST SURGERY      biopsy x 3   • CERVICAL BIOPSY      PT UNAWARE OF CERVIX BIOPSY   • IMPACTED THIRD MOLAR REMOVAL      wisdom teeth extracted in which two partially impacted   • OTHER SURGICAL HISTORY  10/02/2019    surgical bx of abd wall tumor   • PERIPHERALLY INSERTED CENTRAL CATHETER INSERTION Left 11/2019   • TONSILLECTOMY     • VAGINAL HYSTERECTOMY      with anterior repair   • VAGINAL HYSTERECTOMY W/ ANTERIOR AND POSTERIOR VAGINAL REPAIR N/A 11/15/2016    Procedure: VAGINAL HYSTERECTOMY WITH ANTERIOR VAGINAL REPAIR;  Surgeon: Henry Pitt MD;  Location:  [facility] OR;  Service:    • VENOUS ACCESS DEVICE (PORT) REMOVAL N/A 11/23/2019    Procedure: REMOVAL VENOUS ACCESS DEVICE;  Surgeon: Primo Swenson MD;  Location:  [facility] OR;  Service: General       Allergies   Allergen Reactions   • Betadine [Povidone Iodine]    • Codeine    • Contrast Dye Angioedema     Unknown   • E-Mycin [Erythromycin]    • Epinephrine    • Iodine    • Lidocaine Other (See Comments)     PT states when PICC was being placed and Lidocaine was being used she had a tightness feeling around her neck.  PT thought her gown was getting tight around her neck and when she went to pull the gown down, it was not around her neck.    • Other      MSG, Rema, Preservatives in eye ointment   • Procaine Other (See Comments)     DENTAL ANASTHESIA CAUSED " "HEART RACING- YRS AGO\"   • Sudafed [Pseudoephedrine Hcl] Palpitations       Family History and Social History reviewed and changed as necessary    REVIEW OF SYSTEM:   No new somatic complaints    PHYSICAL EXAM:  No petechiae or ecchymoses.  No rash.  Lungs clear.    Vitals:    10/14/21 1250   BP: 127/67   Pulse: 94   Resp: 18   Temp: 97.1 °F (36.2 °C)   TempSrc: Temporal   SpO2: 97%   Weight: 68.9 kg (151 lb 12.8 oz)   Height: 157.5 cm (62\")     Vitals:    10/14/21 1250   PainSc: 0-No pain          ECOG score: 0           Vitals reviewed.    ECOG: (0) Fully Active - Able to Carry On All Pre-disease Performance Without Restriction    Lab Results   Component Value Date    HGB 12.6 10/05/2021    HCT 37.2 10/05/2021    .5 (H) 10/05/2021    PLT 75 (L) 10/05/2021    WBC 2.59 (L) 10/05/2021    NEUTROABS 0.22 (L) 10/05/2021    LYMPHSABS 0.92 10/05/2021    MONOSABS 1.43 (H) 10/05/2021    EOSABS 0.00 10/05/2021    BASOSABS 0.01 10/05/2021       Lab Results   Component Value Date    GLUCOSE 94 08/11/2021    BUN 12 08/11/2021    CREATININE 0.86 08/11/2021     08/11/2021    K 3.9 08/11/2021     08/11/2021    CO2 25.0 08/11/2021    CALCIUM 9.3 08/11/2021    PROTEINTOT 6.2 08/11/2021    ALBUMIN 4.20 08/11/2021    BILITOT 1.1 08/11/2021    ALKPHOS 59 08/11/2021    AST 19 08/11/2021    ALT 10 08/11/2021             ASSESSMENT & PLAN:  6. 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 History of reflux  8. History of transient global amnesia  9. IV dye allergy as well as allergy to barium enema  10. 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  6/8/2016 colonoscopy benign  1/2019 apparently had CT screening of the chest that showed a couple of liver abnormalities (do not have those reports).  Unable to do with contrast and IV dye allergy.  Asymptomatic.  9/5/2019 CT of the abdomen without contrast shows at least 2 hepatic lesions, 4 cm in the right lobe of the liver and 2 cm in the left lobe of the liver.  9/11/2019 PET scan shows too numerous to count increased hypermetabolism in the anterior mediastinal, subcarinal, right lower lobe, head of the pancreas, esophagus, adjacent paraesophageal lymph nodes, liver, splenic hilum, left and right adrenal glands.  Also uptake in the mesentery, right retroperitoneum, right iliac chain, periumbilical subcutaneous fat, vaginal stump, and numerous bony lesions.  The bony lesions include the spine, right shoulder, right rib, sacrum, and right hip.  The 1.3 cm periumbilical subcutaneous nodule appears most amenable to biopsy.  Extensive periportal and periaortic lymphadenopathy with evidence of peritoneal metastasis for which PET CT was recommended.  9/18/2019 CT-guided fine-needle aspirate of nodule within the anterior abdominal wall \"atypical\" but unable to further typify.  9/26/2019 Dr. Kline initial visit.  Reviewed PET with patient and worrisome but nondiagnostic fine-needle aspirate.  Going to get sufficient tissue not only for diagnosis but for molecular testing that could include Biotheranostics to distinguish tissue type and 15 slides for Strata " "testing.  We will get our navigator on board as well.  She is having some nighttime sweats without fever though she does not describe them as bed drenching but this is new for her in the last month.  The range of possibilities here are broad and include lymphoma as well as a multiplicity of carcinomas but I need more than \"atypical\" cells on a fine-needle aspirate to ultimately decide our course.  We did talk about the likelihood that what ever we are up against it is unlikely to be curative but we will wait out her risk benefit ratio once we know the pathology.  Though they may ultimately not be helpful, while we are getting tissue I will go ahead and send off a barrage of tumor markers that might help guide us to the primary.    -10/7/2019 medical oncology office visit: Preliminary path report shows T-cell rich B-cell non-Hodgkin's lymphoma.  While awaiting final pathology we will get her port and echocardiogram and plan on treating her with Rituxan CHOP with Neulasta device for her stage IV high-grade lymphoma.  She has a high risk IPI score.  We will get her LDH and uric acid today.  Risks including renal dysfunction, nausea, neuropathy, pancytopenia, cardiac dysfunction, infusion reactions, etc. were discussed in detail in addition to additional side effects as standard per all these medicines.  She knows that if this is the T-cell rich B-cell lymphoma that we treat this like a diffuse large B-cell lymphoma and that there is a possibility but not a guarantee of cure and it is not the majority in this setting but that plan and the statistics may change if the final pathology changes.    -10/8/2019 hepatitis B panel: Negative hepatitis B core antibody, negative hepatitis B surface antigen, reactive hepatitis B surface antibody.  Will need to monitor for any elevation of liver function studies during therapy.    -10/15/2019: Communication from Dr. Damian from [place] is that the final decision is that this is T-cell " rich/histiocyte rich diffuse large B cell lymphoma.  Plan to proceed with R-CHOP with cooling cap.     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  Ejection fraction 58%.  Has cooling cap [employer].  There is a international shortage of vincristine.  She will get vincristine with R-CHOP course #1 today but I am hesitant to just drop vincristine from all of her subsequent regimens but we will not have any to give her.  I plan to switch her regimen to EPOCHR but have removed the vincristine from the plan.  This will make her cooling cap a moot point since this is an 4-day infusional chemotherapy.  I will admit her on November 11 through hospitalists and plan for Neulasta shot as an outpatient on November 16.    -11/22/2019 through 11/25/2019 inpatient for MRSA line infection.  Port removed.    -12/2/19 hematology oncology office visit: Will finish 4 weeks of IV antibiotics with Dr. Alvarenga on approximately 12/23/2019.  Will get PET prior to then.  Has received 1 course of R-CHOP and 1 course of EPCHR.  Vincristine is now available and we will go back to R-CHOP for course #3 but we need to finish IV antibiotics to ensure clearance of infection.  We will see her back 12/23/2019 for R-CHOP with PET prior to return.  We will plan on using the PICC line that currently is being used for her IV antibiotics.  Has noted some bubbles in her urine that I am not sure the significance.  No josiane evidence of fistula.    -12/23/2019 hematology oncology office visit: Prior bone and adenopathy abnormalities on PET resolved on images I reviewed and reports thereof.  Vincristine now available.  This is course #3 of treatment (R-CHOP today) we will rescan after 2 more courses but plan on a total of 6 courses given the stage IV presentation with bone involvement.   We will give 1 extra week to help her clear the infection thoroughly.  She stopped antibiotics 2 days ago intravenously.  Rescan every couple of courses.    -2/3/2020 PET negative    -7/6/2020 PET negative    -1/11/2021 CT chest, abdomen and pelvis negative    -7/6/2021 CT chest, abdomen and pelvis negative.  CBC with WBC 3.2, hemoglobin 12.1, platelet count 107,000, ANC 1.3.  CMP normal.  .  We will repeat CBC in 6 weeks and again prior to return in 6 months with repeat CT scans.  At that point she will be 2 years out from treatment and if still negative will discontinue routine surveillance imaging at that time.    -8/26/2021 data: White count 2400, hemoglobin 12.2, platelets 73,000.  Hypercellular marrow with left shifted myeloid maturation and increased monocytes with left shift.  No definitive atypia.  Evolving myeloid neoplasm cannot entirely be excluded.  Cytogenetics pending.  No flow cytometric evidence of lymphoproliferative disorder.    -9/2/2021 [facility] oncology follow-up visit: With hypercellularity of marrow and no flow cytometric or light microscopic evidence of lymphoma nor CT evidence of lymphoma, the specter of lurking secondary leukemia still exists.  Cytogenetics are pending.  I have nothing else to add at this point other than to watch her counts for the moment and if they continue to decline, will refer to [facility] leukemia service for further evaluation.  No josiane leukemia per our pathologist review at this junction.  I will see her back in a month with CBC just prior to return with peripheral smear.    -9/7/2021 bone marrow cytogenetics called to me from Dr. Jonah Damian.  Translocation (9: 11) consistent with MLL gene associated with monocytic leukemia or treatment related myeloid leukemia.  Dr. Damian and Braulio confirmed that there is at most 7-9% monocytic precursors but not frankly leukemic.  Nonetheless, this is concerning for emerging leukemia and I am  contacting Dr. Montanez and company at  to get her in for further management.  I called patient this day and informed her of this these results thereof.  This is not occurrence of her lymphoma but likely secondary leukemia emerging.    -9/15/2021 [facility] hematology consult Dr.Reshma Umana.  While technically not acute leukemic, presumed to have likely myelodysplasia and would watch serially but placed on prophylactic Zovirax, Levaquin, and Diflucan.  Follow-up scheduled 10/19/2021    -10/14/2021 [facility] hematology follow-up visit: Cytogenetics returned with translocation 9; 11 suggesting mL L rearrangement in 90% of the metaphases indicative of therapy-related myeloid neoplasm.  Confirmed by second opinion at [facility].  Appointment with Dr. Umana 10/19/2021 for management at [facility].  I reviewed 10/5/2021 CBC.  White count 2590 stable with hemoglobin 12.6 stable and .5.  Platelets 75,000 where it has been since mid August.  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  At this junction I will see on an as-needed basis and  leukemia service will manage    Total time of care today inclusive of time spent today prior to her arrival reviewing bone marrow results and translocation results and notes from  and plans as outlined by them and during visit translating this to patient and outlining the need for her to follow-up with  leukemia physicians took 30 minutes of patient care time throughout the day today  Ranulfo Kline MD       10/14/2021

## 2021-12-30 NOTE — TELEPHONE ENCOUNTER
Discussed with Dr. Kline, he would like to cancel scans and appts here and have patient only follow at . Called patient and she verbalized understanding.

## 2021-12-30 NOTE — TELEPHONE ENCOUNTER
Caller: KATHERIN    Relationship to patient: SELF    Best call back number: [phone]    Patient is needing: TO KNOW IF SHE SHOULD KEEP SCAN ON 1-3-2022 AND F/U APPT WITH DR. PURVIS OR CANCEL SINCE DR. PURVIS RELEASED HER TO [facility].

## 2022-01-01 ENCOUNTER — APPOINTMENT (OUTPATIENT)
Dept: CT IMAGING | Facility: HOSPITAL | Age: 72
End: 2022-01-01

## 2024-06-12 NOTE — PROGRESS NOTES
"Patient called me today to check in. Patient says that \"so far I am doing good.\" Patient has no complaints at this time. Patient says that she has gone walking a few times. Her daughter left today to go back to her family and they had a good visit. Patient was asking when her next cycle would begin in December. I looked into Epic and it looked like she is scheduled for 12/2. Patient knows that on November 11th she will go into the hospital for chemotherapy and then get her Neulasta in [facility] on 11/16. Patient says that she has had lots of energy and is eating well. I will continue to navigate as needed. AG  "
.

## (undated) DEVICE — GLV SURG SENSICARE MICRO PF LF 7.5 STRL

## (undated) DEVICE — GOWN,PREVENTION PLUS,XXLARGE,STERILE: Brand: MEDLINE

## (undated) DEVICE — GLV SURG SENSICARE MICRO PF LF 7 STRL

## (undated) DEVICE — APPL CHLORAPREP W/TINT 26ML BLU

## (undated) DEVICE — PK MINOR SPLT 10

## (undated) DEVICE — ANTIBACTERIAL UNDYED BRAIDED (POLYGLACTIN 910), SYNTHETIC ABSORBABLE SUTURE: Brand: COATED VICRYL

## (undated) DEVICE — COVER,LIGHT HANDLE,FLX,1/PK: Brand: MEDLINE INDUSTRIES, INC.

## (undated) DEVICE — DRSNG SURESITE WNDW 2.38X2.75